# Patient Record
Sex: MALE | Race: WHITE | NOT HISPANIC OR LATINO | Employment: OTHER | URBAN - METROPOLITAN AREA
[De-identification: names, ages, dates, MRNs, and addresses within clinical notes are randomized per-mention and may not be internally consistent; named-entity substitution may affect disease eponyms.]

---

## 2018-04-10 LAB
ALBUMIN SERPL BCP-MCNC: 4.3 G/DL (ref 3.5–5.7)
ALP SERPL-CCNC: 68 IU/L (ref 55–165)
ALT SERPL W P-5'-P-CCNC: 12 IU/L (ref 10–35)
ANION GAP SERPL CALCULATED.3IONS-SCNC: 10.7 MM/L
AST SERPL W P-5'-P-CCNC: 16 U/L (ref 8–27)
BASOPHILS # BLD AUTO: 0.1 X3/UL (ref 0–0.3)
BASOPHILS # BLD AUTO: 1 % (ref 0–2)
BILIRUB SERPL-MCNC: 0.8 MG/DL (ref 0.3–1)
BUN SERPL-MCNC: 18 MG/DL (ref 7–25)
CALCIUM SERPL-MCNC: 9.5 MG/DL (ref 8.6–10.5)
CHLORIDE SERPL-SCNC: 103 MM/L (ref 98–107)
CHOLEST SERPL-MCNC: 110 MG/DL (ref 0–200)
CO2 SERPL-SCNC: 31 MM/L (ref 21–31)
CREAT SERPL-MCNC: 0.97 MG/DL (ref 0.7–1.3)
DEPRECATED RDW RBC AUTO: 13.7 % (ref 11.5–14.5)
EGFR (HISTORICAL): > 60 GFR
EGFR AFRICAN AMERICAN (HISTORICAL): > 60 GFR
EOSINOPHIL # BLD AUTO: 0.6 X3/UL (ref 0–0.5)
EOSINOPHIL NFR BLD AUTO: 7.1 % (ref 0–5)
GLUCOSE (HISTORICAL): 100 MG/DL (ref 65–99)
HCT VFR BLD AUTO: 45 % (ref 42–52)
HDLC SERPL-MCNC: 43 MG/DL (ref 40–60)
HGB BLD-MCNC: 14.9 G/DL (ref 14–18)
LDLC SERPL CALC-MCNC: 51.6 MG/DL (ref 75–193)
LYMPHOCYTES # BLD AUTO: 2.4 X3/UL (ref 1.2–4.2)
LYMPHOCYTES NFR BLD AUTO: 27 % (ref 20.5–51.1)
MCH RBC QN AUTO: 30.1 PG (ref 26–34)
MCHC RBC AUTO-ENTMCNC: 33.1 G/DL (ref 31–36)
MCV RBC AUTO: 90.9 FL (ref 81–99)
MONOCYTES # BLD AUTO: 0.9 X3/UL (ref 0–1)
MONOCYTES NFR BLD AUTO: 9.6 % (ref 1.7–12)
NEUTROPHILS # BLD AUTO: 5 X3/UL (ref 1.4–6.5)
NEUTS SEG NFR BLD AUTO: 55.3 % (ref 42.2–75.2)
OSMOLALITY, SERUM (HISTORICAL): 281 MOSM (ref 262–291)
PLATELET # BLD AUTO: 186 X3/UL (ref 130–400)
PMV BLD AUTO: 9.5 FL (ref 8.6–11.7)
POTASSIUM SERPL-SCNC: 4.7 MM/L (ref 3.5–5.5)
PSA (HISTORICAL): 5.29 NG/ML (ref 0–4)
RBC # BLD AUTO: 4.95 X6/UL (ref 4.3–5.9)
SODIUM SERPL-SCNC: 140 MM/L (ref 134–143)
TOTAL PROTEIN (HISTORICAL): 6.4 G/DL (ref 6.4–8.9)
TRIGL SERPL-MCNC: 78 MG/DL (ref 44–166)
VLDL CHOLESTEROL (HISTORICAL): 16 MG/DL (ref 5–51)
WBC # BLD AUTO: 9 X3/UL (ref 4.8–10.8)

## 2018-09-24 ENCOUNTER — APPOINTMENT (OUTPATIENT)
Dept: LAB | Facility: HOSPITAL | Age: 63
End: 2018-09-24
Attending: INTERNAL MEDICINE
Payer: COMMERCIAL

## 2018-09-24 ENCOUNTER — TRANSCRIBE ORDERS (OUTPATIENT)
Dept: ADMINISTRATIVE | Facility: HOSPITAL | Age: 63
End: 2018-09-24

## 2018-09-24 DIAGNOSIS — E78.5 HYPERLIPIDEMIA, UNSPECIFIED HYPERLIPIDEMIA TYPE: Primary | ICD-10-CM

## 2018-09-24 DIAGNOSIS — F17.200 SMOKER: ICD-10-CM

## 2018-09-24 DIAGNOSIS — E78.5 HYPERLIPIDEMIA, UNSPECIFIED HYPERLIPIDEMIA TYPE: ICD-10-CM

## 2018-09-24 DIAGNOSIS — R97.20 ELEVATED PSA: ICD-10-CM

## 2018-09-24 LAB
CHOLEST SERPL-MCNC: 119 MG/DL (ref 0–200)
HDLC SERPL-MCNC: 46 MG/DL (ref 40–60)
LDLC SERPL CALC-MCNC: 52 MG/DL (ref 75–193)
NONHDLC SERPL-MCNC: 73 MG/DL
TRIGL SERPL-MCNC: 103 MG/DL (ref 44–166)

## 2018-09-24 PROCEDURE — 80061 LIPID PANEL: CPT

## 2018-09-24 PROCEDURE — 36415 COLL VENOUS BLD VENIPUNCTURE: CPT

## 2018-09-24 PROCEDURE — 84153 ASSAY OF PSA TOTAL: CPT

## 2018-09-24 PROCEDURE — 84154 ASSAY OF PSA FREE: CPT

## 2018-09-25 LAB
PSA FREE MFR SERPL: 13.1 %
PSA FREE SERPL-MCNC: 0.77 NG/ML
PSA SERPL-MCNC: 5.9 NG/ML (ref 0–4)

## 2018-10-07 ENCOUNTER — HOSPITAL ENCOUNTER (OUTPATIENT)
Dept: CT IMAGING | Facility: HOSPITAL | Age: 63
Discharge: HOME | End: 2018-10-07
Attending: INTERNAL MEDICINE
Payer: COMMERCIAL

## 2018-10-07 DIAGNOSIS — F17.200 SMOKER: ICD-10-CM

## 2019-01-25 ENCOUNTER — TRANSCRIBE ORDERS (OUTPATIENT)
Dept: ADMINISTRATIVE | Facility: HOSPITAL | Age: 64
End: 2019-01-25

## 2019-01-25 ENCOUNTER — APPOINTMENT (OUTPATIENT)
Dept: LAB | Facility: HOSPITAL | Age: 64
End: 2019-01-25
Attending: UROLOGY
Payer: COMMERCIAL

## 2019-01-25 DIAGNOSIS — R97.20 ELEVATED PROSTATE SPECIFIC ANTIGEN (PSA): Primary | ICD-10-CM

## 2019-01-25 DIAGNOSIS — R97.20 ELEVATED PROSTATE SPECIFIC ANTIGEN (PSA): ICD-10-CM

## 2019-01-25 PROCEDURE — 36415 COLL VENOUS BLD VENIPUNCTURE: CPT

## 2019-01-25 PROCEDURE — 84153 ASSAY OF PSA TOTAL: CPT

## 2019-01-25 PROCEDURE — 84154 ASSAY OF PSA FREE: CPT

## 2019-01-26 LAB
PSA FREE MFR SERPL: 10.6 %
PSA FREE SERPL-MCNC: 0.7 NG/ML
PSA SERPL-MCNC: 6.6 NG/ML (ref 0–4)

## 2019-02-11 RX ORDER — GABAPENTIN 300 MG/1
CAPSULE ORAL
Qty: 360 CAPSULE | Refills: 3 | OUTPATIENT
Start: 2019-02-11

## 2019-02-19 ENCOUNTER — TRANSCRIBE ORDERS (OUTPATIENT)
Dept: ADMINISTRATIVE | Facility: HOSPITAL | Age: 64
End: 2019-02-19

## 2019-02-19 DIAGNOSIS — R97.20 ELEVATED PROSTATE SPECIFIC ANTIGEN (PSA): Primary | ICD-10-CM

## 2019-02-28 ENCOUNTER — HOSPITAL ENCOUNTER (OUTPATIENT)
Dept: ULTRASOUND IMAGING | Facility: HOSPITAL | Age: 64
Discharge: HOME | End: 2019-02-28
Attending: UROLOGY | Admitting: UROLOGY
Payer: COMMERCIAL

## 2019-02-28 DIAGNOSIS — R97.20 ELEVATED PROSTATE SPECIFIC ANTIGEN (PSA): ICD-10-CM

## 2019-02-28 PROCEDURE — G0416 PROSTATE BIOPSY, ANY MTHD: HCPCS | Performed by: PATHOLOGY

## 2019-02-28 PROCEDURE — 76942 ECHO GUIDE FOR BIOPSY: CPT

## 2019-02-28 PROCEDURE — 55700 HB BIOPSY OF PROSTATE: CPT

## 2019-03-13 ENCOUNTER — TELEPHONE (OUTPATIENT)
Dept: UROLOGY | Facility: MEDICAL CENTER | Age: 64
End: 2019-03-13

## 2019-03-13 NOTE — TELEPHONE ENCOUNTER
Jeromy Arroyo from Dr Fuentes's office referred patient to office  Patient has appointment in [facility] office on 03/23/2019  Renato Infante to have patent arrive 15 minutes prior to fill out new patient paperwork  New Patient Intake form:    Complaint/Diagnosis:  Prostate Cancer    Insurance:[employer]    History of Cancer: Prostate Cancer    Previous urologist: Dr Shana Lebron    Outside Testing/where:     If yes, what kind:    Records Requested/Where:Dr Emiliano Bean    Preferred location:

## 2019-03-15 RX ORDER — GABAPENTIN 600 MG/1
600 TABLET ORAL 2 TIMES DAILY
COMMUNITY
Start: 2019-02-06 | End: 2022-04-04

## 2019-03-15 RX ORDER — TRAMADOL HYDROCHLORIDE 50 MG/1
50 TABLET ORAL 3 TIMES DAILY PRN
Refills: 0 | COMMUNITY
Start: 2019-02-03 | End: 2022-04-04 | Stop reason: SDUPTHER

## 2019-03-15 RX ORDER — TAMSULOSIN HYDROCHLORIDE 0.4 MG/1
0.8 CAPSULE ORAL DAILY
Refills: 0 | COMMUNITY
Start: 2019-02-03 | End: 2019-04-10

## 2019-03-21 RX ORDER — ROSUVASTATIN CALCIUM 10 MG/1
10 TABLET, COATED ORAL DAILY
COMMUNITY
Start: 2018-06-04

## 2019-03-22 ENCOUNTER — OFFICE VISIT (OUTPATIENT)
Dept: UROLOGY | Facility: MEDICAL CENTER | Age: 64
End: 2019-03-22
Payer: COMMERCIAL

## 2019-03-22 VITALS
SYSTOLIC BLOOD PRESSURE: 110 MMHG | WEIGHT: 122 LBS | DIASTOLIC BLOOD PRESSURE: 74 MMHG | HEIGHT: 68 IN | BODY MASS INDEX: 18.49 KG/M2 | HEART RATE: 76 BPM

## 2019-03-22 DIAGNOSIS — C61 MALIGNANT NEOPLASM OF PROSTATE (HCC): Primary | ICD-10-CM

## 2019-03-22 DIAGNOSIS — N13.8 BPH WITH OBSTRUCTION/LOWER URINARY TRACT SYMPTOMS: ICD-10-CM

## 2019-03-22 DIAGNOSIS — N40.1 BPH WITH OBSTRUCTION/LOWER URINARY TRACT SYMPTOMS: ICD-10-CM

## 2019-03-22 LAB
SL AMB  POCT GLUCOSE, UA: ABNORMAL
SL AMB LEUKOCYTE ESTERASE,UA: ABNORMAL
SL AMB POCT BILIRUBIN,UA: ABNORMAL
SL AMB POCT BLOOD,UA: ABNORMAL
SL AMB POCT CLARITY,UA: CLEAR
SL AMB POCT COLOR,UA: YELLOW
SL AMB POCT KETONES,UA: ABNORMAL
SL AMB POCT NITRITE,UA: ABNORMAL
SL AMB POCT PH,UA: 7
SL AMB POCT SPECIFIC GRAVITY,UA: 1.01
SL AMB POCT URINE PROTEIN: ABNORMAL
SL AMB POCT UROBILINOGEN: 0.2

## 2019-03-22 PROCEDURE — 81003 URINALYSIS AUTO W/O SCOPE: CPT | Performed by: UROLOGY

## 2019-03-22 PROCEDURE — 99245 OFF/OP CONSLTJ NEW/EST HI 55: CPT | Performed by: UROLOGY

## 2019-03-22 RX ORDER — ASPIRIN 81 MG/1
81 TABLET ORAL DAILY
Status: ON HOLD | COMMUNITY
End: 2019-04-22

## 2019-03-22 NOTE — LETTER
March 22, 2019     Johnnie Jim MD  [address]  [facility] [address]    Patient: Jarett Loredo   YOB: 1955   Date of Visit: 3/22/2019       Dear Dr Ashia Lang: Thank you for referring Jonny Dasilva to me for evaluation  Below are my notes for this consultation  If you have questions, please do not hesitate to call me  I look forward to following your patient along with you  Sincerely,        Romel Nicholas MD        CC: Claudette Browning, DO Mackey Maxin, MD  3/22/2019  3:58 PM  Sign at close encounter    Jarett Loredo 61 y o  male MRN: [id number]    Assessment/Plan     Assessment:  Prostate cancer  History of BPH    Plan:  Plan:  After careful review and summary of the medical history and all laboratory and radiographic studies as outlined above, I discussed with the patient and family members at length the finding of adenocarcinoma of the prostate  We discussed the patient's grade (Mulberry score 6 and 7), other parameters, and clinical stage based upon the above findings  I then discussed the options available to him at this time, based on his clinically localized prostate cancer  These include observation, radiation therapy, radical prostatectomy and cryosurgery  Regarding observation, they understand this is ideally suited for men with either clinically insignificant or slow growing cancer or for whom because of age or other concurrent medical conditions the risks of treatment are greater than the potential benefits of treatment  While this option avoids treatment-associated side effects, I explained it requires frequent evaluation in order to monitor for potential disease progression, and that despite vigilant follow-up there will be cases where the cancer spreads and is no longer potentially curable with local therapies   I reiterated that for many men, watchful waiting can be associated with increased anxiety and stress concerning their diagnosis, and that commonly patients only defer definitive treatment to a later date  With regard to radiation therapy, I reviewed the options available  These include external beam radiation therapy versus brachytherapy  Regarding XRT, we discussed the differences in conventional versus 3D conformal external beam radiation therapy versus IMRT  The indications, risks and potential complications of each of these options as well as the rationale for using selective radiation options were reviewed in detail  We also discussed interstitial seed therapy and risk stratification as a determinant for optimal therapies  All questions were again answered  I did offer to refer the patient to the radiation oncologists directly in order to further clarify these issues  With regard to the surgical options, we reviewed the different options including a radical perineal prostatectomy versus the radical retropubic prostatectomy via open, laparoscopic and robotic approach  I explained the operations that I primarily perform, robotic-assisted laparoscopic radical prostatectomy, and compared it with the alternative that I am also experienced with, traditional open retropubic prostatectomy  I also mentioned the less commonly used but accepted perineal approach  The advantages and limitations of these various approaches were described in detail  The anticipated hospital and post-operative courses were reviewed  I highlighted the relevant anatomy, and we discussed the importance of neurovascular bundle preservation (nerve-sparing) to minimize negative effects on erectile function and continence  While in the majority of cases bilateral nerve-sparing is appropriate and possible, they understand that in certain circumstances intentional partial or complete unilateral or bilateral neurovascular bundle resection is necessary when there is suspicion that cancer extends into that region; this may be determined either preoperatively or intraoperatively with or without biopsy   The patient is clearly aware that he may still be rendered sexually impotent and incontinent as a consequence of the operative procedure  The possibility of stress-type urinary incontinence associated with either form of radical prostatectomy was also detailed for the patient  I explained that concurrent pelvic lymphadenectomy may be performed, for intermediate, or high risk clinical disease or suspicious intraoperative adenopathy, which serves both a diagnostic and potentially therapeutic purpose if metastatic disease is identified  We discussed the general risks of surgery, which include but are not limited to infection, bleeding, medical and anesthetic complications, and adjacent organ involvement or injury  With regard to cryosurgery, this is felt to be an experimental option at this time secondary to insufficient data to support its routine use in this clinical setting  Regarding hormonal therapy, we discussed it is primarily used as systemic therapy for patients with advanced or metastatic disease, but that it also has been shown to be beneficial in conjunction with radiation therapy for certain categories of patients  The patient understands that hormonal therapy alone is not considered curative treatment and will slow the growth of but not eliminate prostate cancer  The patient was advised to become an active participant in their care and to become proactive in their care  They were encouraged to call my office with any unanswered questions or seek additional medical opinions at their discretion  They understand that the decision as to which approach to take is one made based on the medical risks and benefits as well as their own informed tolerance of this risk  He was offered referral to radiation oncology and is interested in surgery  Once the patient is scheduled for a RALP/PLND, he will need preadmission testing and anesthesia clearance   Kegel exercises were reviewed, and he may be seen, pre operatively, by our continence nurse  I have discussed the risks, benefits and alternatives to the proposed procedure/treatment plan with the patient   Our discussion also included the risks and benefits of the alternatives, including doing nothing  He was encouraged to ask questions and all questions were answered to their satisfaction  At the end of our discussion the patient gave their verbal consent to the proposed procedure/treatment plan  History of Present Illness   HPI:  Александр Espinosa is a 61 y o  male who presents with a PSA of 6 6, and underwent a prostate biopsy on 02/28/2019 by Dr Osiel Choudhury, which demonstrated prostate cancer Amanda score 7 and a Beatris score 6, in 9 of 12 cores     Patient does report a prior history of BPH for which he is being treated with tamsulosin  He is here for a opinion concerning the options for treating his recently diagnosed prostate cancer  He had a left inguinal hernia repair performed 20 or 30 years ago the through an inguinal incision  Final Diagnosis   A  Prostate, right base lateral, core needle biopsy:             - Prostatic adenocarcinoma, Beatris score 3 + 4 = 7, Prognostic Grade Group II, discontinuously involving 90% of one core biopsy              - Less than 10% Beatris pattern 4              - Perineural invasion is identified      B  Prostate, right base, core needle biopsy:             - Prostatic adenocarcinoma, Amanda score 3 + 3 = 6, Prognostic Grade Group I  Involving 35% of one core biopsy  - Perineural invasion is identified      C  Prostate, left base lateral, core needle biopsy:             - Prostatic adenocarcinoma, Beatris score 4 + 3 = 7, Prognostic Grade Group III, involving 60% of one core biopsy  - Approximately 50% Amanda pattern 4             - Perineural invasion is identified      D  Prostate, left base, core needle biopsy:             - Benign prostate glands               - No malignancy is identified      E  Prostate, right mid lateral, core needle biopsy:             - Minute focus of prostatic adenocarcinoma, Beatris score 3 + 3 = 6, Prognostic Grade Group I, involving less than 5% of one core biopsy      F  Prostate, right mid, core needle biopsy:             - Prostatic adenocarcinoma, Beatris score 3 + 3 = 6, Prognostic Grade Group I, involving 20% of one core biopsy      G  Prostate, left mid lateral, core needle biopsy             - Prostatic adenocarcinoma, Beatris score 4 + 3 = 7, Prognostic Grade Group III, discontinuously involving 90% of one of 2 core biopsies (approximately 80% Beatris pattern 4)  - Prostatic adenocarcinoma, [place] score 3 + 4 = 7, Prognostic Grade Group II, involving 95% of 1 of 2 core biopsies (approximately 25% [place] pattern 4)      H  Prostate, left mid, core needle biopsy:             - Prostatic adenocarcinoma, Beatris score 4 + 3 = 7, Prognostic Grade Group III, involving approximately 50% of one core biopsy (approximately 90% Beatris pattern 4)      I  Prostate, right apex lateral, core needle biopsy:             - Benign prostate glands  - No malignancy is identified      J  Prostate, right apex, core needle biopsy:             - Benign prostate glands  - no malignancy is identified      K  Prostate, left apex lateral, core needle biopsy:             - Prostatic adenocarcinoma, [place] score 3 + 4 = 7, Prognostic Grade Group II, involving 90% of 1 fragmented core biopsy (less than 5% Beatris pattern 4)      L  Prostate, left apex, core needle biopsy:             - Prostatic adenocarcinoma, Beatris score 3 + 3 = 6, Prognostic Grade Group I  Involving 35% of one core biopsy  Review of Systems   Constitutional: Negative  HENT: Negative  Eyes: Negative  Respiratory: Negative  Cardiovascular: Negative  Negative for chest pain  Gastrointestinal: Negative  Endocrine: Negative  Genitourinary: Negative  Musculoskeletal: Negative  Skin: Negative  Allergic/Immunologic: Negative  Neurological: Negative  Hematological: Negative  Psychiatric/Behavioral: Negative  Historical Information   History reviewed  No pertinent past medical history  Past Surgical History:   Procedure Laterality Date    CARDIAC SURGERY  2006    INGUINAL HERNIA REPAIR Right 1996    SKIN CANCER EXCISION  2016    US GUIDED PROSTATE BIOPSY  2/28/2019     Social History   Social History     Substance and Sexual Activity   Alcohol Use Not Currently     Social History     Substance and Sexual Activity   Drug Use Never     Social History     Tobacco Use   Smoking Status Current Every Day Smoker    Packs/day: 1 00    Years: 30 00    Pack years: 30 00   Smokeless Tobacco Never Used     Family History:   Family History   Problem Relation Age of Onset    Cancer Father         bladder    Lung cancer Father         metastatic       Meds/Allergies   all medications and allergies reviewed  No Known Allergies    Objective   Vitals: Blood pressure 110/74, pulse 76, height 5' 8" (1 727 m), weight 55 3 kg (122 lb)  Physical Exam   Constitutional: He is oriented to person, place, and time  He appears well-developed and well-nourished  No distress  HENT:   Head: Normocephalic and atraumatic  Eyes: Pupils are equal, round, and reactive to light  Conjunctivae and EOM are normal    Neck: Normal range of motion  Neck supple  Cardiovascular: Normal rate, regular rhythm, normal heart sounds and intact distal pulses  No murmur heard  Pulmonary/Chest: Effort normal and breath sounds normal  No respiratory distress  He has no wheezes  Abdominal: Soft  Bowel sounds are normal  There is no tenderness  Genitourinary: Penis normal  Prostate is enlarged  Prostate is not tender  Genitourinary Comments: Prostate exam:  2 to 3/4 enlargement with bilateral induration but no obvious nodules  Musculoskeletal: Normal range of motion  Lymphadenopathy:     He has no cervical adenopathy  Neurological: He is alert and oriented to person, place, and time  Skin: Skin is warm and dry  Psychiatric: He has a normal mood and affect  His behavior is normal  Judgment and thought content normal        Lab Results: I have personally reviewed pertinent reports  CBC: No results found for: WBC, HGB, HCT, MCV, PLT, ADJUSTEDWBC, MCH, MCHC, RDW, MPV, NRBC  CMP: No results found for: SODIUM, CL, CO2, ANIONGAP, BUN, CREATININE, GLUCOSE, CALCIUM, AST, ALT, ALKPHOS, PROT, BILITOT, EGFR  Imaging: I have personally reviewed pertinent reports  and I have personally reviewed pertinent films in PACS  EKG, Pathology, and Other Studies: I have personally reviewed pertinent reports  and I have personally reviewed pertinent films in PACS    Counseling / Coordination of Care  Total floor / unit time spent today 80 minutes  Greater than 50% of total time was spent with the patient and / or family counseling and / or coordination of care  A description of the counseling / coordination of care

## 2019-03-22 NOTE — H&P
H&P Exam - Urology   Maya Nogueira 61 y o  male MRN: [id number]    Assessment/Plan     Assessment:  Prostate cancer  History of BPH    Plan:  Plan:  After careful review and summary of the medical history and all laboratory and radiographic studies as outlined above, I discussed with the patient and family members at length the finding of adenocarcinoma of the prostate  We discussed the patient's grade ([place] score 6 and 7), other parameters, and clinical stage based upon the above findings  I then discussed the options available to him at this time, based on his clinically localized prostate cancer  These include observation, radiation therapy, radical prostatectomy and cryosurgery  Regarding observation, they understand this is ideally suited for men with either clinically insignificant or slow growing cancer or for whom because of age or other concurrent medical conditions the risks of treatment are greater than the potential benefits of treatment  While this option avoids treatment-associated side effects, I explained it requires frequent evaluation in order to monitor for potential disease progression, and that despite vigilant follow-up there will be cases where the cancer spreads and is no longer potentially curable with local therapies  I reiterated that for many men, watchful waiting can be associated with increased anxiety and stress concerning their diagnosis, and that commonly patients only defer definitive treatment to a later date  With regard to radiation therapy, I reviewed the options available  These include external beam radiation therapy versus brachytherapy  Regarding XRT, we discussed the differences in conventional versus 3D conformal external beam radiation therapy versus IMRT  The indications, risks and potential complications of each of these options as well as the rationale for using selective radiation options were reviewed in detail   We also discussed interstitial seed therapy and risk stratification as a determinant for optimal therapies  All questions were again answered  I did offer to refer the patient to the radiation oncologists directly in order to further clarify these issues  With regard to the surgical options, we reviewed the different options including a radical perineal prostatectomy versus the radical retropubic prostatectomy via open, laparoscopic and robotic approach  I explained the operations that I primarily perform, robotic-assisted laparoscopic radical prostatectomy, and compared it with the alternative that I am also experienced with, traditional open retropubic prostatectomy  I also mentioned the less commonly used but accepted perineal approach  The advantages and limitations of these various approaches were described in detail  The anticipated hospital and post-operative courses were reviewed  I highlighted the relevant anatomy, and we discussed the importance of neurovascular bundle preservation (nerve-sparing) to minimize negative effects on erectile function and continence  While in the majority of cases bilateral nerve-sparing is appropriate and possible, they understand that in certain circumstances intentional partial or complete unilateral or bilateral neurovascular bundle resection is necessary when there is suspicion that cancer extends into that region; this may be determined either preoperatively or intraoperatively with or without biopsy  The patient is clearly aware that he may still be rendered sexually impotent and incontinent as a consequence of the operative procedure  The possibility of stress-type urinary incontinence associated with either form of radical prostatectomy was also detailed for the patient   I explained that concurrent pelvic lymphadenectomy may be performed, for intermediate, or high risk clinical disease or suspicious intraoperative adenopathy, which serves both a diagnostic and potentially therapeutic purpose if metastatic disease is identified  We discussed the general risks of surgery, which include but are not limited to infection, bleeding, medical and anesthetic complications, and adjacent organ involvement or injury  With regard to cryosurgery, this is felt to be an experimental option at this time secondary to insufficient data to support its routine use in this clinical setting  Regarding hormonal therapy, we discussed it is primarily used as systemic therapy for patients with advanced or metastatic disease, but that it also has been shown to be beneficial in conjunction with radiation therapy for certain categories of patients  The patient understands that hormonal therapy alone is not considered curative treatment and will slow the growth of but not eliminate prostate cancer  The patient was advised to become an active participant in their care and to become proactive in their care  They were encouraged to call my office with any unanswered questions or seek additional medical opinions at their discretion  They understand that the decision as to which approach to take is one made based on the medical risks and benefits as well as their own informed tolerance of this risk  He was offered referral to radiation oncology and is interested in surgery  Once the patient is scheduled for a RALP/PLND, he will need preadmission testing and anesthesia clearance  Kegel exercises were reviewed, and he may be seen, pre operatively, by our continence nurse  I have discussed the risks, benefits and alternatives to the proposed procedure/treatment plan with the patient   Our discussion also included the risks and benefits of the alternatives, including doing nothing  He was encouraged to ask questions and all questions were answered to their satisfaction  At the end of our discussion the patient gave their verbal consent to the proposed procedure/treatment plan              History of Present Illness   HPI:  Kelly De La Cruz is a 61 y o  male who presents with a PSA of 6 6, and underwent a prostate biopsy on 02/28/2019 by Dr Christian Ivey, which demonstrated prostate cancer Beatris score 7 and a Beatris score 6, in 9 of 12 cores     Patient does report a prior history of BPH for which he is being treated with tamsulosin  He is here for a opinion concerning the options for treating his recently diagnosed prostate cancer  He had a left inguinal hernia repair performed 20 or 30 years ago the through an inguinal incision  Final Diagnosis   A  Prostate, right base lateral, core needle biopsy:             - Prostatic adenocarcinoma, Beatris score 3 + 4 = 7, Prognostic Grade Group II, discontinuously involving 90% of one core biopsy              - Less than 10% [place] pattern 4              - Perineural invasion is identified      B  Prostate, right base, core needle biopsy:             - Prostatic adenocarcinoma, [place] score 3 + 3 = 6, Prognostic Grade Group I  Involving 35% of one core biopsy  - Perineural invasion is identified      C  Prostate, left base lateral, core needle biopsy:             - Prostatic adenocarcinoma, Beatris score 4 + 3 = 7, Prognostic Grade Group III, involving 60% of one core biopsy  - Approximately 50% [place] pattern 4             - Perineural invasion is identified      D  Prostate, left base, core needle biopsy:             - Benign prostate glands  - No malignancy is identified      E  Prostate, right mid lateral, core needle biopsy:             - Minute focus of prostatic adenocarcinoma, Beatris score 3 + 3 = 6, Prognostic Grade Group I, involving less than 5% of one core biopsy      F  Prostate, right mid, core needle biopsy:             - Prostatic adenocarcinoma, Beatris score 3 + 3 = 6, Prognostic Grade Group I, involving 20% of one core biopsy      G   Prostate, left mid lateral, core needle biopsy             - Prostatic adenocarcinoma, Beatris score 4 + 3 = 7, Prognostic Grade Group III, discontinuously involving 90% of one of 2 core biopsies (approximately 80% [place]ris pattern 4)  - Prostatic adenocarcinoma, Beatris score 3 + 4 = 7, Prognostic Grade Group II, involving 95% of 1 of 2 core biopsies (approximately 25% [place] pattern 4)      H  Prostate, left mid, core needle biopsy:             - Prostatic adenocarcinoma, [place] score 4 + 3 = 7, Prognostic Grade Group III, involving approximately 50% of one core biopsy (approximately 90% Beatris pattern 4)      I  Prostate, right apex lateral, core needle biopsy:             - Benign prostate glands  - No malignancy is identified      J  Prostate, right apex, core needle biopsy:             - Benign prostate glands  - no malignancy is identified      K  Prostate, left apex lateral, core needle biopsy:             - Prostatic adenocarcinoma, Beatris score 3 + 4 = 7, Prognostic Grade Group II, involving 90% of 1 fragmented core biopsy (less than 5% [place] pattern 4)      L  Prostate, left apex, core needle biopsy:             - Prostatic adenocarcinoma, Beatris score 3 + 3 = 6, Prognostic Grade Group I  Involving 35% of one core biopsy  Review of Systems   Constitutional: Negative  HENT: Negative  Eyes: Negative  Respiratory: Negative  Cardiovascular: Negative  Negative for chest pain  Gastrointestinal: Negative  Endocrine: Negative  Genitourinary: Negative  Musculoskeletal: Negative  Skin: Negative  Allergic/Immunologic: Negative  Neurological: Negative  Hematological: Negative  Psychiatric/Behavioral: Negative  Historical Information   History reviewed  No pertinent past medical history    Past Surgical History:   Procedure Laterality Date    CARDIAC SURGERY  2006    INGUINAL HERNIA REPAIR Right 1996    SKIN CANCER EXCISION  2016    US GUIDED PROSTATE BIOPSY  2/28/2019     Social History   Social History     Substance and Sexual Activity Alcohol Use Not Currently     Social History     Substance and Sexual Activity   Drug Use Never     Social History     Tobacco Use   Smoking Status Current Every Day Smoker    Packs/day: 1 00    Years: 30 00    Pack years: 30 00   Smokeless Tobacco Never Used     Family History:   Family History   Problem Relation Age of Onset    Cancer Father         bladder    Lung cancer Father         metastatic       Meds/Allergies   all medications and allergies reviewed  No Known Allergies    Objective   Vitals: Blood pressure 110/74, pulse 76, height 5' 8" (1 727 m), weight 55 3 kg (122 lb)  Physical Exam   Constitutional: He is oriented to person, place, and time  He appears well-developed and well-nourished  No distress  HENT:   Head: Normocephalic and atraumatic  Eyes: Pupils are equal, round, and reactive to light  Conjunctivae and EOM are normal    Neck: Normal range of motion  Neck supple  Cardiovascular: Normal rate, regular rhythm, normal heart sounds and intact distal pulses  No murmur heard  Pulmonary/Chest: Effort normal and breath sounds normal  No respiratory distress  He has no wheezes  Abdominal: Soft  Bowel sounds are normal  There is no tenderness  Genitourinary: Penis normal  Prostate is enlarged  Prostate is not tender  Genitourinary Comments: Prostate exam:  2 to 3/4 enlargement with bilateral induration but no obvious nodules  Musculoskeletal: Normal range of motion  Lymphadenopathy:     He has no cervical adenopathy  Neurological: He is alert and oriented to person, place, and time  Skin: Skin is warm and dry  Psychiatric: He has a normal mood and affect  His behavior is normal  Judgment and thought content normal        Lab Results: I have personally reviewed pertinent reports      CBC: No results found for: WBC, HGB, HCT, MCV, PLT, ADJUSTEDWBC, MCH, MCHC, RDW, MPV, NRBC  CMP: No results found for: SODIUM, CL, CO2, ANIONGAP, BUN, CREATININE, GLUCOSE, CALCIUM, AST, ALT, ALKPHOS, PROT, BILITOT, EGFR  Imaging: I have personally reviewed pertinent reports  and I have personally reviewed pertinent films in PACS  EKG, Pathology, and Other Studies: I have personally reviewed pertinent reports  and I have personally reviewed pertinent films in PACS    Counseling / Coordination of Care  Total floor / unit time spent today 80 minutes  Greater than 50% of total time was spent with the patient and / or family counseling and / or coordination of care  A description of the counseling / coordination of care

## 2019-03-22 NOTE — PROGRESS NOTES
Constance Koch 61 y o  male MRN: [id number]    Assessment/Plan     Assessment:  Prostate cancer  History of BPH    Plan:  Plan:  After careful review and summary of the medical history and all laboratory and radiographic studies as outlined above, I discussed with the patient and family members at length the finding of adenocarcinoma of the prostate  We discussed the patient's grade (Haswell score 6 and 7), other parameters, and clinical stage based upon the above findings  I then discussed the options available to him at this time, based on his clinically localized prostate cancer  These include observation, radiation therapy, radical prostatectomy and cryosurgery  Regarding observation, they understand this is ideally suited for men with either clinically insignificant or slow growing cancer or for whom because of age or other concurrent medical conditions the risks of treatment are greater than the potential benefits of treatment  While this option avoids treatment-associated side effects, I explained it requires frequent evaluation in order to monitor for potential disease progression, and that despite vigilant follow-up there will be cases where the cancer spreads and is no longer potentially curable with local therapies  I reiterated that for many men, watchful waiting can be associated with increased anxiety and stress concerning their diagnosis, and that commonly patients only defer definitive treatment to a later date  With regard to radiation therapy, I reviewed the options available  These include external beam radiation therapy versus brachytherapy  Regarding XRT, we discussed the differences in conventional versus 3D conformal external beam radiation therapy versus IMRT  The indications, risks and potential complications of each of these options as well as the rationale for using selective radiation options were reviewed in detail   We also discussed interstitial seed therapy and risk stratification as a determinant for optimal therapies  All questions were again answered  I did offer to refer the patient to the radiation oncologists directly in order to further clarify these issues  With regard to the surgical options, we reviewed the different options including a radical perineal prostatectomy versus the radical retropubic prostatectomy via open, laparoscopic and robotic approach  I explained the operations that I primarily perform, robotic-assisted laparoscopic radical prostatectomy, and compared it with the alternative that I am also experienced with, traditional open retropubic prostatectomy  I also mentioned the less commonly used but accepted perineal approach  The advantages and limitations of these various approaches were described in detail  The anticipated hospital and post-operative courses were reviewed  I highlighted the relevant anatomy, and we discussed the importance of neurovascular bundle preservation (nerve-sparing) to minimize negative effects on erectile function and continence  While in the majority of cases bilateral nerve-sparing is appropriate and possible, they understand that in certain circumstances intentional partial or complete unilateral or bilateral neurovascular bundle resection is necessary when there is suspicion that cancer extends into that region; this may be determined either preoperatively or intraoperatively with or without biopsy  The patient is clearly aware that he may still be rendered sexually impotent and incontinent as a consequence of the operative procedure  The possibility of stress-type urinary incontinence associated with either form of radical prostatectomy was also detailed for the patient   I explained that concurrent pelvic lymphadenectomy may be performed, for intermediate, or high risk clinical disease or suspicious intraoperative adenopathy, which serves both a diagnostic and potentially therapeutic purpose if metastatic disease is identified  We discussed the general risks of surgery, which include but are not limited to infection, bleeding, medical and anesthetic complications, and adjacent organ involvement or injury  With regard to cryosurgery, this is felt to be an experimental option at this time secondary to insufficient data to support its routine use in this clinical setting  Regarding hormonal therapy, we discussed it is primarily used as systemic therapy for patients with advanced or metastatic disease, but that it also has been shown to be beneficial in conjunction with radiation therapy for certain categories of patients  The patient understands that hormonal therapy alone is not considered curative treatment and will slow the growth of but not eliminate prostate cancer  The patient was advised to become an active participant in their care and to become proactive in their care  They were encouraged to call my office with any unanswered questions or seek additional medical opinions at their discretion  They understand that the decision as to which approach to take is one made based on the medical risks and benefits as well as their own informed tolerance of this risk  He was offered referral to radiation oncology and is interested in surgery  Once the patient is scheduled for a RALP/PLND, he will need preadmission testing and anesthesia clearance  Kegel exercises were reviewed, and he may be seen, pre operatively, by our continence nurse  I have discussed the risks, benefits and alternatives to the proposed procedure/treatment plan with the patient   Our discussion also included the risks and benefits of the alternatives, including doing nothing  He was encouraged to ask questions and all questions were answered to their satisfaction  At the end of our discussion the patient gave their verbal consent to the proposed procedure/treatment plan  History of Present Illness   HPI:  Yael Romo is a 61 y o  male who presents with a PSA of 6 6, and underwent a prostate biopsy on 02/28/2019 by Dr Damian Aguilar, which demonstrated prostate cancer Beatris score 7 and a Dexter score 6, in 9 of 12 cores     Patient does report a prior history of BPH for which he is being treated with tamsulosin  He is here for a opinion concerning the options for treating his recently diagnosed prostate cancer  He had a left inguinal hernia repair performed 20 or 30 years ago the through an inguinal incision  Final Diagnosis   A  Prostate, right base lateral, core needle biopsy:             - Prostatic adenocarcinoma, Dexter score 3 + 4 = 7, Prognostic Grade Group II, discontinuously involving 90% of one core biopsy              - Less than 10% Beatris pattern 4              - Perineural invasion is identified      B  Prostate, right base, core needle biopsy:             - Prostatic adenocarcinoma, Dexter score 3 + 3 = 6, Prognostic Grade Group I  Involving 35% of one core biopsy  - Perineural invasion is identified      C  Prostate, left base lateral, core needle biopsy:             - Prostatic adenocarcinoma, Beatris score 4 + 3 = 7, Prognostic Grade Group III, involving 60% of one core biopsy  - Approximately 50% Beatris pattern 4             - Perineural invasion is identified      D  Prostate, left base, core needle biopsy:             - Benign prostate glands  - No malignancy is identified      E  Prostate, right mid lateral, core needle biopsy:             - Minute focus of prostatic adenocarcinoma, Dexter score 3 + 3 = 6, Prognostic Grade Group I, involving less than 5% of one core biopsy      F  Prostate, right mid, core needle biopsy:             - Prostatic adenocarcinoma, Dexter score 3 + 3 = 6, Prognostic Grade Group I, involving 20% of one core biopsy      G   Prostate, left mid lateral, core needle biopsy             - Prostatic adenocarcinoma, Beatris score 4 + 3 = 7, Prognostic Grade Group III, discontinuously involving 90% of one of 2 core biopsies (approximately 80% Beatris pattern 4)  - Prostatic adenocarcinoma, Beatris score 3 + 4 = 7, Prognostic Grade Group II, involving 95% of 1 of 2 core biopsies (approximately 25% Beatris pattern 4)      H  Prostate, left mid, core needle biopsy:             - Prostatic adenocarcinoma, Beatris score 4 + 3 = 7, Prognostic Grade Group III, involving approximately 50% of one core biopsy (approximately 90% Wilder pattern 4)      I  Prostate, right apex lateral, core needle biopsy:             - Benign prostate glands  - No malignancy is identified      J  Prostate, right apex, core needle biopsy:             - Benign prostate glands  - no malignancy is identified      K  Prostate, left apex lateral, core needle biopsy:             - Prostatic adenocarcinoma, Beatris score 3 + 4 = 7, Prognostic Grade Group II, involving 90% of 1 fragmented core biopsy (less than 5% Beatris pattern 4)      L  Prostate, left apex, core needle biopsy:             - Prostatic adenocarcinoma, Beatris score 3 + 3 = 6, Prognostic Grade Group I  Involving 35% of one core biopsy  Review of Systems   Constitutional: Negative  HENT: Negative  Eyes: Negative  Respiratory: Negative  Cardiovascular: Negative  Negative for chest pain  Gastrointestinal: Negative  Endocrine: Negative  Genitourinary: Negative  Musculoskeletal: Negative  Skin: Negative  Allergic/Immunologic: Negative  Neurological: Negative  Hematological: Negative  Psychiatric/Behavioral: Negative  Historical Information   History reviewed  No pertinent past medical history    Past Surgical History:   Procedure Laterality Date    CARDIAC SURGERY  2006    INGUINAL HERNIA REPAIR Right 1996    SKIN CANCER EXCISION  2016    US GUIDED PROSTATE BIOPSY  2/28/2019     Social History   Social History     Substance and Sexual Activity   Alcohol Use Not Currently     Social History     Substance and Sexual Activity   Drug Use Never     Social History     Tobacco Use   Smoking Status Current Every Day Smoker    Packs/day: 1 00    Years: 30 00    Pack years: 30 00   Smokeless Tobacco Never Used     Family History:   Family History   Problem Relation Age of Onset    Cancer Father         bladder    Lung cancer Father         metastatic       Meds/Allergies   all medications and allergies reviewed  No Known Allergies    Objective   Vitals: Blood pressure 110/74, pulse 76, height 5' 8" (1 727 m), weight 55 3 kg (122 lb)  Physical Exam   Constitutional: He is oriented to person, place, and time  He appears well-developed and well-nourished  No distress  HENT:   Head: Normocephalic and atraumatic  Eyes: Pupils are equal, round, and reactive to light  Conjunctivae and EOM are normal    Neck: Normal range of motion  Neck supple  Cardiovascular: Normal rate, regular rhythm, normal heart sounds and intact distal pulses  No murmur heard  Pulmonary/Chest: Effort normal and breath sounds normal  No respiratory distress  He has no wheezes  Abdominal: Soft  Bowel sounds are normal  There is no tenderness  Genitourinary: Penis normal  Prostate is enlarged  Prostate is not tender  Genitourinary Comments: Prostate exam:  2 to 3/4 enlargement with bilateral induration but no obvious nodules  Musculoskeletal: Normal range of motion  Lymphadenopathy:     He has no cervical adenopathy  Neurological: He is alert and oriented to person, place, and time  Skin: Skin is warm and dry  Psychiatric: He has a normal mood and affect  His behavior is normal  Judgment and thought content normal        Lab Results: I have personally reviewed pertinent reports      CBC: No results found for: WBC, HGB, HCT, MCV, PLT, ADJUSTEDWBC, MCH, MCHC, RDW, MPV, NRBC  CMP: No results found for: SODIUM, CL, CO2, ANIONGAP, BUN, CREATININE, GLUCOSE, CALCIUM, AST, ALT, ALKPHOS, PROT, BILITOT, EGFR  Imaging: I have personally reviewed pertinent reports  and I have personally reviewed pertinent films in PACS  EKG, Pathology, and Other Studies: I have personally reviewed pertinent reports  and I have personally reviewed pertinent films in PACS    Counseling / Coordination of Care  Total floor / unit time spent today 80 minutes  Greater than 50% of total time was spent with the patient and / or family counseling and / or coordination of care  A description of the counseling / coordination of care

## 2019-03-25 ENCOUNTER — TELEPHONE (OUTPATIENT)
Dept: UROLOGY | Facility: MEDICAL CENTER | Age: 64
End: 2019-03-25

## 2019-03-25 DIAGNOSIS — C61 MALIGNANT NEOPLASM OF PROSTATE (HCC): ICD-10-CM

## 2019-03-25 NOTE — TELEPHONE ENCOUNTER
Patient dropped off FMLA and Short term disability Forms to the office today 3/25/2019, however his surgery is not yet scheduled  Paper work will be completed when the surgery date has been established  Patient will also make forms fee payment when date has been established  3/26/19 @ 1126AM, patient scheduled his procedure for 4/22/2019  I left a voice mail message for the patient to call my direct line  Patient advised that the forms fee for the two forms is $45 00  Patient will need to pay the forms fee prior to faxing or at  in the office  3/26/19 @ 11:29AM return phone call from patient stating he will pay the form fee at   Patient advised that the forms will be ready for  any time tomorrow 3/27/19 at the  [address]

## 2019-03-26 PROBLEM — C61 MALIGNANT NEOPLASM OF PROSTATE (HCC): Status: ACTIVE | Noted: 2019-03-26

## 2019-04-02 ENCOUNTER — OFFICE VISIT (OUTPATIENT)
Dept: CARDIOLOGY CLINIC | Facility: CLINIC | Age: 64
End: 2019-04-02
Payer: COMMERCIAL

## 2019-04-02 VITALS
HEIGHT: 68 IN | HEART RATE: 66 BPM | BODY MASS INDEX: 18.49 KG/M2 | WEIGHT: 122 LBS | DIASTOLIC BLOOD PRESSURE: 70 MMHG | SYSTOLIC BLOOD PRESSURE: 108 MMHG

## 2019-04-02 DIAGNOSIS — Z01.810 PRE-OPERATIVE CARDIOVASCULAR EXAMINATION: ICD-10-CM

## 2019-04-02 DIAGNOSIS — E78.5 DYSLIPIDEMIA: ICD-10-CM

## 2019-04-02 DIAGNOSIS — I25.10 CORONARY ARTERY DISEASE INVOLVING NATIVE CORONARY ARTERY OF NATIVE HEART WITHOUT ANGINA PECTORIS: Primary | ICD-10-CM

## 2019-04-02 PROCEDURE — 99214 OFFICE O/P EST MOD 30 MIN: CPT | Performed by: INTERNAL MEDICINE

## 2019-04-02 PROCEDURE — 93000 ELECTROCARDIOGRAM COMPLETE: CPT | Performed by: INTERNAL MEDICINE

## 2019-04-04 ENCOUNTER — TELEPHONE (OUTPATIENT)
Dept: UROLOGY | Facility: MEDICAL CENTER | Age: 64
End: 2019-04-04

## 2019-04-09 DIAGNOSIS — C61 PROSTATE CANCER (HCC): Primary | ICD-10-CM

## 2019-04-10 ENCOUNTER — APPOINTMENT (OUTPATIENT)
Dept: LAB | Facility: HOSPITAL | Age: 64
End: 2019-04-10
Attending: UROLOGY
Payer: COMMERCIAL

## 2019-04-10 ENCOUNTER — PROCEDURE VISIT (OUTPATIENT)
Dept: UROLOGY | Facility: MEDICAL CENTER | Age: 64
End: 2019-04-10
Payer: COMMERCIAL

## 2019-04-10 ENCOUNTER — HOSPITAL ENCOUNTER (OUTPATIENT)
Dept: NON INVASIVE DIAGNOSTICS | Facility: HOSPITAL | Age: 64
Discharge: HOME | End: 2019-04-10
Attending: UROLOGY
Payer: COMMERCIAL

## 2019-04-10 ENCOUNTER — HOSPITAL ENCOUNTER (OUTPATIENT)
Dept: RADIOLOGY | Facility: HOSPITAL | Age: 64
Discharge: HOME | End: 2019-04-10
Attending: UROLOGY
Payer: COMMERCIAL

## 2019-04-10 ENCOUNTER — ANESTHESIA EVENT (OUTPATIENT)
Dept: PERIOP | Facility: HOSPITAL | Age: 64
DRG: 707 | End: 2019-04-10
Payer: COMMERCIAL

## 2019-04-10 ENCOUNTER — APPOINTMENT (OUTPATIENT)
Dept: PREADMISSION TESTING | Facility: HOSPITAL | Age: 64
End: 2019-04-10
Payer: COMMERCIAL

## 2019-04-10 DIAGNOSIS — C61 PROSTATE CANCER (HCC): ICD-10-CM

## 2019-04-10 DIAGNOSIS — C61 MALIGNANT NEOPLASM OF PROSTATE (HCC): Primary | ICD-10-CM

## 2019-04-10 LAB
ABO GROUP BLD: NORMAL
ALBUMIN SERPL BCP-MCNC: 4.1 G/DL (ref 3.5–5)
ALP SERPL-CCNC: 77 U/L (ref 46–116)
ALT SERPL W P-5'-P-CCNC: 19 U/L (ref 12–78)
ANION GAP SERPL CALCULATED.3IONS-SCNC: 6 MMOL/L (ref 4–13)
APTT PPP: 34 SECONDS (ref 26–38)
AST SERPL W P-5'-P-CCNC: 17 U/L (ref 5–45)
BASOPHILS # BLD AUTO: 0.08 THOUSANDS/ΜL (ref 0–0.1)
BASOPHILS NFR BLD AUTO: 1 % (ref 0–1)
BILIRUB SERPL-MCNC: 0.7 MG/DL (ref 0.2–1)
BLD GP AB SCN SERPL QL: NEGATIVE
BUN SERPL-MCNC: 15 MG/DL (ref 5–25)
CALCIUM SERPL-MCNC: 9.4 MG/DL (ref 8.3–10.1)
CHLORIDE SERPL-SCNC: 102 MMOL/L (ref 100–108)
CO2 SERPL-SCNC: 32 MMOL/L (ref 21–32)
CREAT SERPL-MCNC: 0.86 MG/DL (ref 0.6–1.3)
EOSINOPHIL # BLD AUTO: 0.31 THOUSAND/ΜL (ref 0–0.61)
EOSINOPHIL NFR BLD AUTO: 4 % (ref 0–6)
ERYTHROCYTE [DISTWIDTH] IN BLOOD BY AUTOMATED COUNT: 13.3 % (ref 11.6–15.1)
GFR SERPL CREATININE-BSD FRML MDRD: 92 ML/MIN/1.73SQ M
GLUCOSE SERPL-MCNC: 87 MG/DL (ref 65–140)
HCT VFR BLD AUTO: 45 % (ref 36.5–49.3)
HGB BLD-MCNC: 15 G/DL (ref 12–17)
IMM GRANULOCYTES # BLD AUTO: 0.01 THOUSAND/UL (ref 0–0.2)
IMM GRANULOCYTES NFR BLD AUTO: 0 % (ref 0–2)
INR PPP: 0.97 (ref 0.86–1.17)
LYMPHOCYTES # BLD AUTO: 2.5 THOUSANDS/ΜL (ref 0.6–4.47)
LYMPHOCYTES NFR BLD AUTO: 30 % (ref 14–44)
MCH RBC QN AUTO: 31.4 PG (ref 26.8–34.3)
MCHC RBC AUTO-ENTMCNC: 33.3 G/DL (ref 31.4–37.4)
MCV RBC AUTO: 94 FL (ref 82–98)
MONOCYTES # BLD AUTO: 0.67 THOUSAND/ΜL (ref 0.17–1.22)
MONOCYTES NFR BLD AUTO: 8 % (ref 4–12)
NEUTROPHILS # BLD AUTO: 4.77 THOUSANDS/ΜL (ref 1.85–7.62)
NEUTS SEG NFR BLD AUTO: 57 % (ref 43–75)
NRBC BLD AUTO-RTO: 0 /100 WBCS
PLATELET # BLD AUTO: 181 THOUSANDS/UL (ref 149–390)
PMV BLD AUTO: 11.4 FL (ref 8.9–12.7)
POTASSIUM SERPL-SCNC: 4.6 MMOL/L (ref 3.5–5.3)
PROT SERPL-MCNC: 7.2 G/DL (ref 6.4–8.2)
PROTHROMBIN TIME: 13 SECONDS (ref 11.8–14.2)
RBC # BLD AUTO: 4.77 MILLION/UL (ref 3.88–5.62)
RH BLD: POSITIVE
SODIUM SERPL-SCNC: 140 MMOL/L (ref 136–145)
SPECIMEN EXPIRATION DATE: NORMAL
WBC # BLD AUTO: 8.34 THOUSAND/UL (ref 4.31–10.16)

## 2019-04-10 PROCEDURE — 86901 BLOOD TYPING SEROLOGIC RH(D): CPT

## 2019-04-10 PROCEDURE — 85730 THROMBOPLASTIN TIME PARTIAL: CPT

## 2019-04-10 PROCEDURE — 85025 COMPLETE CBC W/AUTO DIFF WBC: CPT

## 2019-04-10 PROCEDURE — 80053 COMPREHEN METABOLIC PANEL: CPT

## 2019-04-10 PROCEDURE — 71046 X-RAY EXAM CHEST 2 VIEWS: CPT

## 2019-04-10 PROCEDURE — 86900 BLOOD TYPING SEROLOGIC ABO: CPT

## 2019-04-10 PROCEDURE — 99211 OFF/OP EST MAY X REQ PHY/QHP: CPT | Performed by: UROLOGY

## 2019-04-10 PROCEDURE — 86850 RBC ANTIBODY SCREEN: CPT

## 2019-04-10 PROCEDURE — 36415 COLL VENOUS BLD VENIPUNCTURE: CPT

## 2019-04-10 PROCEDURE — 86920 COMPATIBILITY TEST SPIN: CPT

## 2019-04-10 PROCEDURE — 85610 PROTHROMBIN TIME: CPT

## 2019-04-10 RX ORDER — SODIUM CHLORIDE 9 MG/ML
125 INJECTION, SOLUTION INTRAVENOUS CONTINUOUS
Status: CANCELLED | OUTPATIENT
Start: 2019-04-22

## 2019-04-19 PROCEDURE — NC001 PR NO CHARGE: Performed by: UROLOGY

## 2019-04-19 RX ORDER — SODIUM CHLORIDE 9 MG/ML
125 INJECTION, SOLUTION INTRAVENOUS CONTINUOUS
Status: CANCELLED | OUTPATIENT
Start: 2019-04-22

## 2019-04-22 ENCOUNTER — ANESTHESIA (OUTPATIENT)
Dept: PERIOP | Facility: HOSPITAL | Age: 64
DRG: 707 | End: 2019-04-22
Payer: COMMERCIAL

## 2019-04-22 ENCOUNTER — HOSPITAL ENCOUNTER (INPATIENT)
Facility: HOSPITAL | Age: 64
LOS: 3 days | Discharge: HOME HEALTH | DRG: 707 | End: 2019-04-25
Attending: UROLOGY | Admitting: UROLOGY
Payer: COMMERCIAL

## 2019-04-22 DIAGNOSIS — I97.89: Primary | ICD-10-CM

## 2019-04-22 DIAGNOSIS — C61 MALIGNANT NEOPLASM OF PROSTATE (HCC): ICD-10-CM

## 2019-04-22 DIAGNOSIS — J95.89: Primary | ICD-10-CM

## 2019-04-22 PROCEDURE — 88305 TISSUE EXAM BY PATHOLOGIST: CPT | Performed by: PATHOLOGY

## 2019-04-22 PROCEDURE — 88309 TISSUE EXAM BY PATHOLOGIST: CPT | Performed by: PATHOLOGY

## 2019-04-22 PROCEDURE — 0VT04ZZ RESECTION OF PROSTATE, PERCUTANEOUS ENDOSCOPIC APPROACH: ICD-10-PCS | Performed by: UROLOGY

## 2019-04-22 PROCEDURE — 07BC4ZZ EXCISION OF PELVIS LYMPHATIC, PERCUTANEOUS ENDOSCOPIC APPROACH: ICD-10-PCS | Performed by: UROLOGY

## 2019-04-22 PROCEDURE — 55866 LAPS SURG PRST8ECT RPBIC RAD: CPT | Performed by: PHYSICIAN ASSISTANT

## 2019-04-22 PROCEDURE — 8E0W4CZ ROBOTIC ASSISTED PROCEDURE OF TRUNK REGION, PERCUTANEOUS ENDOSCOPIC APPROACH: ICD-10-PCS | Performed by: UROLOGY

## 2019-04-22 PROCEDURE — 38571 LAPAROSCOPY LYMPHADENECTOMY: CPT | Performed by: PHYSICIAN ASSISTANT

## 2019-04-22 PROCEDURE — 99024 POSTOP FOLLOW-UP VISIT: CPT | Performed by: PHYSICIAN ASSISTANT

## 2019-04-22 PROCEDURE — 38571 LAPAROSCOPY LYMPHADENECTOMY: CPT | Performed by: UROLOGY

## 2019-04-22 PROCEDURE — 55866 LAPS SURG PRST8ECT RPBIC RAD: CPT | Performed by: UROLOGY

## 2019-04-22 RX ORDER — CIPROFLOXACIN 250 MG/1
250 TABLET, FILM COATED ORAL EVERY 12 HOURS SCHEDULED
Status: DISCONTINUED | OUTPATIENT
Start: 2019-04-23 | End: 2019-04-25 | Stop reason: HOSPADM

## 2019-04-22 RX ORDER — CEFAZOLIN SODIUM 2 G/50ML
2000 SOLUTION INTRAVENOUS ONCE
Status: COMPLETED | OUTPATIENT
Start: 2019-04-22 | End: 2019-04-22

## 2019-04-22 RX ORDER — GABAPENTIN 600 MG/1
600 TABLET ORAL
COMMUNITY
End: 2020-04-30 | Stop reason: ALTCHOICE

## 2019-04-22 RX ORDER — TRAMADOL HYDROCHLORIDE 50 MG/1
50 TABLET ORAL EVERY 6 HOURS PRN
Status: DISCONTINUED | OUTPATIENT
Start: 2019-04-22 | End: 2019-04-25 | Stop reason: HOSPADM

## 2019-04-22 RX ORDER — MIDAZOLAM HYDROCHLORIDE 1 MG/ML
INJECTION INTRAMUSCULAR; INTRAVENOUS AS NEEDED
Status: DISCONTINUED | OUTPATIENT
Start: 2019-04-22 | End: 2019-04-22 | Stop reason: SURG

## 2019-04-22 RX ORDER — METOCLOPRAMIDE HYDROCHLORIDE 5 MG/ML
10 INJECTION INTRAMUSCULAR; INTRAVENOUS EVERY 6 HOURS SCHEDULED
Status: DISCONTINUED | OUTPATIENT
Start: 2019-04-22 | End: 2019-04-25 | Stop reason: HOSPADM

## 2019-04-22 RX ORDER — ACETAMINOPHEN 325 MG/1
650 TABLET ORAL EVERY 6 HOURS PRN
Status: DISCONTINUED | OUTPATIENT
Start: 2019-04-22 | End: 2019-04-25 | Stop reason: HOSPADM

## 2019-04-22 RX ORDER — ROCURONIUM BROMIDE 10 MG/ML
INJECTION, SOLUTION INTRAVENOUS AS NEEDED
Status: DISCONTINUED | OUTPATIENT
Start: 2019-04-22 | End: 2019-04-22 | Stop reason: SURG

## 2019-04-22 RX ORDER — HEPARIN SODIUM 5000 [USP'U]/ML
5000 INJECTION, SOLUTION INTRAVENOUS; SUBCUTANEOUS EVERY 8 HOURS SCHEDULED
Status: CANCELLED | OUTPATIENT
Start: 2019-04-22

## 2019-04-22 RX ORDER — HEPARIN SODIUM 5000 [USP'U]/ML
5000 INJECTION, SOLUTION INTRAVENOUS; SUBCUTANEOUS EVERY 12 HOURS SCHEDULED
Status: DISCONTINUED | OUTPATIENT
Start: 2019-04-22 | End: 2019-04-23

## 2019-04-22 RX ORDER — SODIUM CHLORIDE 9 MG/ML
INJECTION, SOLUTION INTRAVENOUS AS NEEDED
Status: DISCONTINUED | OUTPATIENT
Start: 2019-04-22 | End: 2019-04-22 | Stop reason: HOSPADM

## 2019-04-22 RX ORDER — HYDROMORPHONE HCL/PF 1 MG/ML
0.5 SYRINGE (ML) INJECTION
Status: DISCONTINUED | OUTPATIENT
Start: 2019-04-22 | End: 2019-04-22 | Stop reason: HOSPADM

## 2019-04-22 RX ORDER — SODIUM CHLORIDE, SODIUM LACTATE, POTASSIUM CHLORIDE, CALCIUM CHLORIDE 600; 310; 30; 20 MG/100ML; MG/100ML; MG/100ML; MG/100ML
125 INJECTION, SOLUTION INTRAVENOUS CONTINUOUS
Status: DISCONTINUED | OUTPATIENT
Start: 2019-04-22 | End: 2019-04-23

## 2019-04-22 RX ORDER — DOCUSATE SODIUM 100 MG/1
100 CAPSULE, LIQUID FILLED ORAL 2 TIMES DAILY
Status: DISCONTINUED | OUTPATIENT
Start: 2019-04-22 | End: 2019-04-25 | Stop reason: HOSPADM

## 2019-04-22 RX ORDER — FAMOTIDINE 20 MG/1
20 TABLET, FILM COATED ORAL 2 TIMES DAILY
Status: DISCONTINUED | OUTPATIENT
Start: 2019-04-22 | End: 2019-04-25 | Stop reason: HOSPADM

## 2019-04-22 RX ORDER — GABAPENTIN 300 MG/1
300 CAPSULE ORAL 2 TIMES DAILY
Status: DISCONTINUED | OUTPATIENT
Start: 2019-04-22 | End: 2019-04-24 | Stop reason: SDUPTHER

## 2019-04-22 RX ORDER — HYDROMORPHONE HCL/PF 1 MG/ML
0.2 SYRINGE (ML) INJECTION
Status: DISCONTINUED | OUTPATIENT
Start: 2019-04-22 | End: 2019-04-23

## 2019-04-22 RX ORDER — OXYCODONE HYDROCHLORIDE AND ACETAMINOPHEN 5; 325 MG/1; MG/1
1 TABLET ORAL EVERY 4 HOURS PRN
Status: COMPLETED | OUTPATIENT
Start: 2019-04-22 | End: 2019-04-25

## 2019-04-22 RX ORDER — MAGNESIUM HYDROXIDE 1200 MG/15ML
LIQUID ORAL AS NEEDED
Status: DISCONTINUED | OUTPATIENT
Start: 2019-04-22 | End: 2019-04-22 | Stop reason: HOSPADM

## 2019-04-22 RX ORDER — OXYBUTYNIN CHLORIDE 5 MG/1
5 TABLET ORAL 4 TIMES DAILY PRN
Status: DISCONTINUED | OUTPATIENT
Start: 2019-04-22 | End: 2019-04-25 | Stop reason: HOSPADM

## 2019-04-22 RX ORDER — DEXAMETHASONE SODIUM PHOSPHATE 4 MG/ML
INJECTION, SOLUTION INTRA-ARTICULAR; INTRALESIONAL; INTRAMUSCULAR; INTRAVENOUS; SOFT TISSUE AS NEEDED
Status: DISCONTINUED | OUTPATIENT
Start: 2019-04-22 | End: 2019-04-22 | Stop reason: SURG

## 2019-04-22 RX ORDER — FENTANYL CITRATE 50 UG/ML
INJECTION, SOLUTION INTRAMUSCULAR; INTRAVENOUS AS NEEDED
Status: DISCONTINUED | OUTPATIENT
Start: 2019-04-22 | End: 2019-04-22 | Stop reason: SURG

## 2019-04-22 RX ORDER — HEPARIN SODIUM 5000 [USP'U]/ML
5000 INJECTION, SOLUTION INTRAVENOUS; SUBCUTANEOUS ONCE
Status: COMPLETED | OUTPATIENT
Start: 2019-04-22 | End: 2019-04-22

## 2019-04-22 RX ORDER — GABAPENTIN 600 MG/1
600 TABLET ORAL
Status: CANCELLED | OUTPATIENT
Start: 2019-04-22

## 2019-04-22 RX ORDER — EPHEDRINE SULFATE 50 MG/ML
INJECTION INTRAVENOUS AS NEEDED
Status: DISCONTINUED | OUTPATIENT
Start: 2019-04-22 | End: 2019-04-22 | Stop reason: SURG

## 2019-04-22 RX ORDER — ONDANSETRON 2 MG/ML
4 INJECTION INTRAMUSCULAR; INTRAVENOUS EVERY 6 HOURS PRN
Status: DISCONTINUED | OUTPATIENT
Start: 2019-04-22 | End: 2019-04-22 | Stop reason: HOSPADM

## 2019-04-22 RX ORDER — ONDANSETRON 2 MG/ML
INJECTION INTRAMUSCULAR; INTRAVENOUS AS NEEDED
Status: DISCONTINUED | OUTPATIENT
Start: 2019-04-22 | End: 2019-04-22 | Stop reason: SURG

## 2019-04-22 RX ORDER — GLYCOPYRROLATE 0.2 MG/ML
INJECTION INTRAMUSCULAR; INTRAVENOUS AS NEEDED
Status: DISCONTINUED | OUTPATIENT
Start: 2019-04-22 | End: 2019-04-22 | Stop reason: SURG

## 2019-04-22 RX ORDER — PROPOFOL 10 MG/ML
INJECTION, EMULSION INTRAVENOUS AS NEEDED
Status: DISCONTINUED | OUTPATIENT
Start: 2019-04-22 | End: 2019-04-22 | Stop reason: SURG

## 2019-04-22 RX ORDER — HYDROMORPHONE HCL/PF 1 MG/ML
SYRINGE (ML) INJECTION AS NEEDED
Status: DISCONTINUED | OUTPATIENT
Start: 2019-04-22 | End: 2019-04-22 | Stop reason: SURG

## 2019-04-22 RX ORDER — FENTANYL CITRATE 50 UG/ML
25 INJECTION, SOLUTION INTRAMUSCULAR; INTRAVENOUS
Status: DISCONTINUED | OUTPATIENT
Start: 2019-04-22 | End: 2019-04-22 | Stop reason: HOSPADM

## 2019-04-22 RX ORDER — SODIUM CHLORIDE 9 MG/ML
125 INJECTION, SOLUTION INTRAVENOUS CONTINUOUS
Status: DISCONTINUED | OUTPATIENT
Start: 2019-04-22 | End: 2019-04-22

## 2019-04-22 RX ORDER — KETOROLAC TROMETHAMINE 30 MG/ML
15 INJECTION, SOLUTION INTRAMUSCULAR; INTRAVENOUS EVERY 6 HOURS
Status: DISCONTINUED | OUTPATIENT
Start: 2019-04-22 | End: 2019-04-23

## 2019-04-22 RX ORDER — SIMETHICONE 80 MG
80 TABLET,CHEWABLE ORAL EVERY 6 HOURS PRN
Status: DISCONTINUED | OUTPATIENT
Start: 2019-04-22 | End: 2019-04-25 | Stop reason: HOSPADM

## 2019-04-22 RX ORDER — ONDANSETRON 2 MG/ML
4 INJECTION INTRAMUSCULAR; INTRAVENOUS EVERY 6 HOURS PRN
Status: DISCONTINUED | OUTPATIENT
Start: 2019-04-22 | End: 2019-04-25 | Stop reason: HOSPADM

## 2019-04-22 RX ORDER — SODIUM CHLORIDE 9 MG/ML
INJECTION, SOLUTION INTRAVENOUS CONTINUOUS PRN
Status: DISCONTINUED | OUTPATIENT
Start: 2019-04-22 | End: 2019-04-22 | Stop reason: SURG

## 2019-04-22 RX ORDER — SODIUM CHLORIDE 9 MG/ML
50 INJECTION, SOLUTION INTRAVENOUS CONTINUOUS
Status: DISCONTINUED | OUTPATIENT
Start: 2019-04-22 | End: 2019-04-23

## 2019-04-22 RX ORDER — NEOSTIGMINE METHYLSULFATE 1 MG/ML
INJECTION INTRAVENOUS AS NEEDED
Status: DISCONTINUED | OUTPATIENT
Start: 2019-04-22 | End: 2019-04-22 | Stop reason: SURG

## 2019-04-22 RX ORDER — CEFAZOLIN SODIUM 1 G/50ML
1000 SOLUTION INTRAVENOUS EVERY 8 HOURS
Status: COMPLETED | OUTPATIENT
Start: 2019-04-22 | End: 2019-04-23

## 2019-04-22 RX ORDER — BACITRACIN, NEOMYCIN, POLYMYXIN B 400; 3.5; 5 [USP'U]/G; MG/G; [USP'U]/G
1 OINTMENT TOPICAL 2 TIMES DAILY
Status: DISCONTINUED | OUTPATIENT
Start: 2019-04-22 | End: 2019-04-25 | Stop reason: HOSPADM

## 2019-04-22 RX ORDER — BUPIVACAINE HYDROCHLORIDE 5 MG/ML
INJECTION, SOLUTION PERINEURAL AS NEEDED
Status: DISCONTINUED | OUTPATIENT
Start: 2019-04-22 | End: 2019-04-22 | Stop reason: HOSPADM

## 2019-04-22 RX ADMIN — SODIUM CHLORIDE 1000 ML: 0.9 INJECTION, SOLUTION INTRAVENOUS at 23:30

## 2019-04-22 RX ADMIN — PHENYLEPHRINE HYDROCHLORIDE 40 MCG/MIN: 10 INJECTION INTRAVENOUS at 13:09

## 2019-04-22 RX ADMIN — ROCURONIUM BROMIDE 50 MG: 10 INJECTION, SOLUTION INTRAVENOUS at 12:41

## 2019-04-22 RX ADMIN — GABAPENTIN 300 MG: 300 CAPSULE ORAL at 18:12

## 2019-04-22 RX ADMIN — DOCUSATE SODIUM 100 MG: 100 CAPSULE, LIQUID FILLED ORAL at 18:12

## 2019-04-22 RX ADMIN — SODIUM CHLORIDE: 0.9 INJECTION, SOLUTION INTRAVENOUS at 12:48

## 2019-04-22 RX ADMIN — MIDAZOLAM 2 MG: 1 INJECTION INTRAMUSCULAR; INTRAVENOUS at 12:39

## 2019-04-22 RX ADMIN — SODIUM CHLORIDE 500 ML: 0.9 INJECTION, SOLUTION INTRAVENOUS at 16:52

## 2019-04-22 RX ADMIN — PHENYLEPHRINE HYDROCHLORIDE 150 MCG: 10 INJECTION INTRAVENOUS at 12:45

## 2019-04-22 RX ADMIN — NEOSTIGMINE METHYLSULFATE 3 MG: 1 INJECTION, SOLUTION INTRAVENOUS at 16:18

## 2019-04-22 RX ADMIN — METOCLOPRAMIDE 10 MG: 5 INJECTION, SOLUTION INTRAMUSCULAR; INTRAVENOUS at 18:42

## 2019-04-22 RX ADMIN — FENTANYL CITRATE 25 MCG: 50 INJECTION, SOLUTION INTRAMUSCULAR; INTRAVENOUS at 17:26

## 2019-04-22 RX ADMIN — PHENYLEPHRINE HYDROCHLORIDE 100 MCG: 10 INJECTION INTRAVENOUS at 15:04

## 2019-04-22 RX ADMIN — ROCURONIUM BROMIDE 10 MG: 10 INJECTION, SOLUTION INTRAVENOUS at 14:10

## 2019-04-22 RX ADMIN — HYDROMORPHONE HYDROCHLORIDE 0.5 MG: 1 INJECTION, SOLUTION INTRAMUSCULAR; INTRAVENOUS; SUBCUTANEOUS at 16:15

## 2019-04-22 RX ADMIN — FENTANYL CITRATE 100 MCG: 50 INJECTION, SOLUTION INTRAMUSCULAR; INTRAVENOUS at 12:41

## 2019-04-22 RX ADMIN — FENTANYL CITRATE 25 MCG: 50 INJECTION, SOLUTION INTRAMUSCULAR; INTRAVENOUS at 17:18

## 2019-04-22 RX ADMIN — SODIUM CHLORIDE: 0.9 INJECTION, SOLUTION INTRAVENOUS at 12:57

## 2019-04-22 RX ADMIN — CEFAZOLIN SODIUM 2000 MG: 2 SOLUTION INTRAVENOUS at 12:47

## 2019-04-22 RX ADMIN — KETOROLAC TROMETHAMINE 15 MG: 30 INJECTION, SOLUTION INTRAMUSCULAR at 18:13

## 2019-04-22 RX ADMIN — SODIUM CHLORIDE 125 ML/HR: 0.9 INJECTION, SOLUTION INTRAVENOUS at 10:29

## 2019-04-22 RX ADMIN — FAMOTIDINE 20 MG: 20 TABLET ORAL at 18:12

## 2019-04-22 RX ADMIN — SODIUM CHLORIDE: 0.9 INJECTION, SOLUTION INTRAVENOUS at 15:36

## 2019-04-22 RX ADMIN — FENTANYL CITRATE 50 MCG: 50 INJECTION, SOLUTION INTRAMUSCULAR; INTRAVENOUS at 13:19

## 2019-04-22 RX ADMIN — DEXAMETHASONE SODIUM PHOSPHATE 6 MG: 4 INJECTION, SOLUTION INTRAMUSCULAR; INTRAVENOUS at 13:28

## 2019-04-22 RX ADMIN — HEPARIN SODIUM 5000 UNITS: 5000 INJECTION, SOLUTION INTRAVENOUS; SUBCUTANEOUS at 11:49

## 2019-04-22 RX ADMIN — ROCURONIUM BROMIDE 5 MG: 10 INJECTION, SOLUTION INTRAVENOUS at 15:16

## 2019-04-22 RX ADMIN — BACITRACIN, NEOMYCIN, POLYMYXIN B 1 SMALL APPLICATION: 400; 3.5; 5 OINTMENT TOPICAL at 18:13

## 2019-04-22 RX ADMIN — FENTANYL CITRATE 25 MCG: 50 INJECTION, SOLUTION INTRAMUSCULAR; INTRAVENOUS at 17:08

## 2019-04-22 RX ADMIN — CEFAZOLIN SODIUM 1000 MG: 1 SOLUTION INTRAVENOUS at 19:50

## 2019-04-22 RX ADMIN — ONDANSETRON 4 MG: 2 INJECTION INTRAMUSCULAR; INTRAVENOUS at 15:16

## 2019-04-22 RX ADMIN — HYDROMORPHONE HYDROCHLORIDE 0.5 MG: 1 INJECTION, SOLUTION INTRAMUSCULAR; INTRAVENOUS; SUBCUTANEOUS at 13:24

## 2019-04-22 RX ADMIN — SODIUM CHLORIDE: 0.9 INJECTION, SOLUTION INTRAVENOUS at 15:22

## 2019-04-22 RX ADMIN — EPHEDRINE SULFATE 10 MG: 50 INJECTION, SOLUTION INTRAVENOUS at 16:15

## 2019-04-22 RX ADMIN — PHENYLEPHRINE HYDROCHLORIDE 100 MCG: 10 INJECTION INTRAVENOUS at 16:10

## 2019-04-22 RX ADMIN — LIDOCAINE HYDROCHLORIDE 100 MG: 20 INJECTION, SOLUTION INTRAVENOUS at 12:41

## 2019-04-22 RX ADMIN — KETOROLAC TROMETHAMINE 15 MG: 30 INJECTION, SOLUTION INTRAMUSCULAR at 23:29

## 2019-04-22 RX ADMIN — PROPOFOL 200 MG: 10 INJECTION, EMULSION INTRAVENOUS at 12:41

## 2019-04-22 RX ADMIN — GLYCOPYRROLATE 0.5 MG: 0.2 INJECTION INTRAMUSCULAR; INTRAVENOUS at 16:18

## 2019-04-22 RX ADMIN — ROCURONIUM BROMIDE 20 MG: 10 INJECTION, SOLUTION INTRAVENOUS at 13:22

## 2019-04-22 RX ADMIN — METOCLOPRAMIDE 10 MG: 5 INJECTION, SOLUTION INTRAMUSCULAR; INTRAVENOUS at 23:29

## 2019-04-22 RX ADMIN — PHENYLEPHRINE HYDROCHLORIDE 150 MCG: 10 INJECTION INTRAVENOUS at 13:14

## 2019-04-22 RX ADMIN — FENTANYL CITRATE 50 MCG: 50 INJECTION, SOLUTION INTRAMUSCULAR; INTRAVENOUS at 13:24

## 2019-04-23 PROBLEM — Z90.79 S/P PROSTATECTOMY: Status: ACTIVE | Noted: 2019-04-23

## 2019-04-23 LAB
ANION GAP SERPL CALCULATED.3IONS-SCNC: 7 MMOL/L (ref 4–13)
BUN SERPL-MCNC: 13 MG/DL (ref 5–25)
CALCIUM SERPL-MCNC: 7.3 MG/DL (ref 8.3–10.1)
CHLORIDE SERPL-SCNC: 112 MMOL/L (ref 100–108)
CO2 SERPL-SCNC: 20 MMOL/L (ref 21–32)
CREAT SERPL-MCNC: 0.84 MG/DL (ref 0.6–1.3)
ERYTHROCYTE [DISTWIDTH] IN BLOOD BY AUTOMATED COUNT: 13.3 % (ref 11.6–15.1)
GFR SERPL CREATININE-BSD FRML MDRD: 93 ML/MIN/1.73SQ M
GLUCOSE SERPL-MCNC: 178 MG/DL (ref 65–140)
HCT VFR BLD AUTO: 27.5 % (ref 36.5–49.3)
HCT VFR BLD AUTO: 28.5 % (ref 36.5–49.3)
HGB BLD-MCNC: 9.2 G/DL (ref 12–17)
HGB BLD-MCNC: 9.4 G/DL (ref 12–17)
MCH RBC QN AUTO: 31.4 PG (ref 26.8–34.3)
MCHC RBC AUTO-ENTMCNC: 33 G/DL (ref 31.4–37.4)
MCV RBC AUTO: 95 FL (ref 82–98)
PLATELET # BLD AUTO: 149 THOUSANDS/UL (ref 149–390)
PMV BLD AUTO: 10.9 FL (ref 8.9–12.7)
POTASSIUM SERPL-SCNC: 4 MMOL/L (ref 3.5–5.3)
RBC # BLD AUTO: 2.99 MILLION/UL (ref 3.88–5.62)
SODIUM SERPL-SCNC: 139 MMOL/L (ref 136–145)
WBC # BLD AUTO: 9.84 THOUSAND/UL (ref 4.31–10.16)

## 2019-04-23 PROCEDURE — 80048 BASIC METABOLIC PNL TOTAL CA: CPT | Performed by: UROLOGY

## 2019-04-23 PROCEDURE — 85014 HEMATOCRIT: CPT | Performed by: UROLOGY

## 2019-04-23 PROCEDURE — 85027 COMPLETE CBC AUTOMATED: CPT | Performed by: UROLOGY

## 2019-04-23 PROCEDURE — 85018 HEMOGLOBIN: CPT | Performed by: UROLOGY

## 2019-04-23 RX ORDER — HYDROMORPHONE HCL/PF 1 MG/ML
0.2 SYRINGE (ML) INJECTION EVERY 4 HOURS PRN
Status: DISCONTINUED | OUTPATIENT
Start: 2019-04-23 | End: 2019-04-25 | Stop reason: HOSPADM

## 2019-04-23 RX ORDER — KETOROLAC TROMETHAMINE 30 MG/ML
15 INJECTION, SOLUTION INTRAMUSCULAR; INTRAVENOUS EVERY 6 HOURS SCHEDULED
Status: DISCONTINUED | OUTPATIENT
Start: 2019-04-23 | End: 2019-04-24

## 2019-04-23 RX ORDER — MORPHINE SULFATE 4 MG/ML
4 INJECTION, SOLUTION INTRAMUSCULAR; INTRAVENOUS EVERY 4 HOURS PRN
Status: DISCONTINUED | OUTPATIENT
Start: 2019-04-23 | End: 2019-04-23

## 2019-04-23 RX ORDER — DEXTROSE AND SODIUM CHLORIDE 5; .9 G/100ML; G/100ML
50 INJECTION, SOLUTION INTRAVENOUS CONTINUOUS
Status: DISCONTINUED | OUTPATIENT
Start: 2019-04-23 | End: 2019-04-25

## 2019-04-23 RX ADMIN — FAMOTIDINE 20 MG: 20 TABLET ORAL at 08:55

## 2019-04-23 RX ADMIN — CIPROFLOXACIN HYDROCHLORIDE 250 MG: 250 TABLET, FILM COATED ORAL at 08:56

## 2019-04-23 RX ADMIN — OXYCODONE HYDROCHLORIDE AND ACETAMINOPHEN 1 TABLET: 5; 325 TABLET ORAL at 11:42

## 2019-04-23 RX ADMIN — KETOROLAC TROMETHAMINE 15 MG: 30 INJECTION, SOLUTION INTRAMUSCULAR at 23:24

## 2019-04-23 RX ADMIN — GABAPENTIN 300 MG: 300 CAPSULE ORAL at 08:55

## 2019-04-23 RX ADMIN — BACITRACIN, NEOMYCIN, POLYMYXIN B 1 SMALL APPLICATION: 400; 3.5; 5 OINTMENT TOPICAL at 17:16

## 2019-04-23 RX ADMIN — DEXTROSE AND SODIUM CHLORIDE 50 ML/HR: 5; .9 INJECTION, SOLUTION INTRAVENOUS at 13:41

## 2019-04-23 RX ADMIN — OXYBUTYNIN CHLORIDE 5 MG: 5 TABLET ORAL at 21:05

## 2019-04-23 RX ADMIN — METOCLOPRAMIDE 10 MG: 5 INJECTION, SOLUTION INTRAMUSCULAR; INTRAVENOUS at 23:24

## 2019-04-23 RX ADMIN — DOCUSATE SODIUM 100 MG: 100 CAPSULE, LIQUID FILLED ORAL at 17:15

## 2019-04-23 RX ADMIN — BACITRACIN, NEOMYCIN, POLYMYXIN B 1 SMALL APPLICATION: 400; 3.5; 5 OINTMENT TOPICAL at 08:55

## 2019-04-23 RX ADMIN — METOCLOPRAMIDE 10 MG: 5 INJECTION, SOLUTION INTRAMUSCULAR; INTRAVENOUS at 05:27

## 2019-04-23 RX ADMIN — DOCUSATE SODIUM 100 MG: 100 CAPSULE, LIQUID FILLED ORAL at 08:55

## 2019-04-23 RX ADMIN — KETOROLAC TROMETHAMINE 15 MG: 30 INJECTION, SOLUTION INTRAMUSCULAR at 13:43

## 2019-04-23 RX ADMIN — MORPHINE SULFATE 4 MG: 4 INJECTION INTRAVENOUS at 12:39

## 2019-04-23 RX ADMIN — CIPROFLOXACIN HYDROCHLORIDE 250 MG: 250 TABLET, FILM COATED ORAL at 21:05

## 2019-04-23 RX ADMIN — KETOROLAC TROMETHAMINE 15 MG: 30 INJECTION, SOLUTION INTRAMUSCULAR at 05:27

## 2019-04-23 RX ADMIN — OXYCODONE HYDROCHLORIDE AND ACETAMINOPHEN 1 TABLET: 5; 325 TABLET ORAL at 07:16

## 2019-04-23 RX ADMIN — OXYCODONE HYDROCHLORIDE AND ACETAMINOPHEN 1 TABLET: 5; 325 TABLET ORAL at 23:24

## 2019-04-23 RX ADMIN — FAMOTIDINE 20 MG: 20 TABLET ORAL at 17:16

## 2019-04-23 RX ADMIN — METOCLOPRAMIDE 10 MG: 5 INJECTION, SOLUTION INTRAMUSCULAR; INTRAVENOUS at 11:42

## 2019-04-23 RX ADMIN — KETOROLAC TROMETHAMINE 15 MG: 30 INJECTION, SOLUTION INTRAMUSCULAR at 17:16

## 2019-04-23 RX ADMIN — GABAPENTIN 300 MG: 300 CAPSULE ORAL at 17:15

## 2019-04-23 RX ADMIN — METOCLOPRAMIDE 10 MG: 5 INJECTION, SOLUTION INTRAMUSCULAR; INTRAVENOUS at 17:16

## 2019-04-23 RX ADMIN — CEFAZOLIN SODIUM 1000 MG: 1 SOLUTION INTRAVENOUS at 03:22

## 2019-04-24 ENCOUNTER — APPOINTMENT (INPATIENT)
Dept: CT IMAGING | Facility: HOSPITAL | Age: 64
DRG: 707 | End: 2019-04-24
Payer: COMMERCIAL

## 2019-04-24 LAB
ABO GROUP BLD BPU: NORMAL
ABO GROUP BLD BPU: NORMAL
ANION GAP SERPL CALCULATED.3IONS-SCNC: 5 MMOL/L (ref 4–13)
ANION GAP SERPL CALCULATED.3IONS-SCNC: 6 MMOL/L (ref 4–13)
BPU ID: NORMAL
BPU ID: NORMAL
BUN SERPL-MCNC: 4 MG/DL (ref 5–25)
BUN SERPL-MCNC: 7 MG/DL (ref 5–25)
CALCIUM SERPL-MCNC: 8.1 MG/DL (ref 8.3–10.1)
CALCIUM SERPL-MCNC: 8.6 MG/DL (ref 8.3–10.1)
CHLORIDE SERPL-SCNC: 109 MMOL/L (ref 100–108)
CHLORIDE SERPL-SCNC: 113 MMOL/L (ref 100–108)
CO2 SERPL-SCNC: 24 MMOL/L (ref 21–32)
CO2 SERPL-SCNC: 29 MMOL/L (ref 21–32)
CREAT SERPL-MCNC: 0.72 MG/DL (ref 0.6–1.3)
CREAT SERPL-MCNC: 0.76 MG/DL (ref 0.6–1.3)
CROSSMATCH: NORMAL
CROSSMATCH: NORMAL
ERYTHROCYTE [DISTWIDTH] IN BLOOD BY AUTOMATED COUNT: 13.7 % (ref 11.6–15.1)
GFR SERPL CREATININE-BSD FRML MDRD: 97 ML/MIN/1.73SQ M
GFR SERPL CREATININE-BSD FRML MDRD: 99 ML/MIN/1.73SQ M
GLUCOSE SERPL-MCNC: 104 MG/DL (ref 65–140)
GLUCOSE SERPL-MCNC: 112 MG/DL (ref 65–140)
HCT VFR BLD AUTO: 26.3 % (ref 36.5–49.3)
HCT VFR BLD AUTO: 28.8 % (ref 36.5–49.3)
HGB BLD-MCNC: 8.7 G/DL (ref 12–17)
HGB BLD-MCNC: 9.4 G/DL (ref 12–17)
MCH RBC QN AUTO: 31.6 PG (ref 26.8–34.3)
MCHC RBC AUTO-ENTMCNC: 33.1 G/DL (ref 31.4–37.4)
MCV RBC AUTO: 96 FL (ref 82–98)
PLATELET # BLD AUTO: 129 THOUSANDS/UL (ref 149–390)
PMV BLD AUTO: 11.8 FL (ref 8.9–12.7)
POTASSIUM SERPL-SCNC: 3.7 MMOL/L (ref 3.5–5.3)
POTASSIUM SERPL-SCNC: 3.7 MMOL/L (ref 3.5–5.3)
RBC # BLD AUTO: 2.75 MILLION/UL (ref 3.88–5.62)
SODIUM SERPL-SCNC: 143 MMOL/L (ref 136–145)
SODIUM SERPL-SCNC: 143 MMOL/L (ref 136–145)
TROPONIN I SERPL-MCNC: 0.03 NG/ML
UNIT DISPENSE STATUS: NORMAL
UNIT DISPENSE STATUS: NORMAL
UNIT PRODUCT CODE: NORMAL
UNIT PRODUCT CODE: NORMAL
UNIT RH: NORMAL
UNIT RH: NORMAL
WBC # BLD AUTO: 8.71 THOUSAND/UL (ref 4.31–10.16)

## 2019-04-24 PROCEDURE — 84484 ASSAY OF TROPONIN QUANT: CPT | Performed by: UROLOGY

## 2019-04-24 PROCEDURE — 99024 POSTOP FOLLOW-UP VISIT: CPT | Performed by: UROLOGY

## 2019-04-24 PROCEDURE — 85027 COMPLETE CBC AUTOMATED: CPT | Performed by: UROLOGY

## 2019-04-24 PROCEDURE — 99254 IP/OBS CNSLTJ NEW/EST MOD 60: CPT | Performed by: INTERNAL MEDICINE

## 2019-04-24 PROCEDURE — 74177 CT ABD & PELVIS W/CONTRAST: CPT

## 2019-04-24 PROCEDURE — 80048 BASIC METABOLIC PNL TOTAL CA: CPT | Performed by: UROLOGY

## 2019-04-24 PROCEDURE — 85018 HEMOGLOBIN: CPT | Performed by: INTERNAL MEDICINE

## 2019-04-24 PROCEDURE — 85014 HEMATOCRIT: CPT | Performed by: INTERNAL MEDICINE

## 2019-04-24 PROCEDURE — 71275 CT ANGIOGRAPHY CHEST: CPT

## 2019-04-24 RX ORDER — GABAPENTIN 300 MG/1
300 CAPSULE ORAL 2 TIMES DAILY
Status: DISCONTINUED | OUTPATIENT
Start: 2019-04-24 | End: 2019-04-25 | Stop reason: HOSPADM

## 2019-04-24 RX ORDER — POLYETHYLENE GLYCOL 3350 17 G/17G
17 POWDER, FOR SOLUTION ORAL DAILY
Status: DISCONTINUED | OUTPATIENT
Start: 2019-04-24 | End: 2019-04-25 | Stop reason: HOSPADM

## 2019-04-24 RX ORDER — METHOCARBAMOL 500 MG/1
500 TABLET, FILM COATED ORAL EVERY 6 HOURS PRN
Status: DISCONTINUED | OUTPATIENT
Start: 2019-04-24 | End: 2019-04-25 | Stop reason: HOSPADM

## 2019-04-24 RX ORDER — SENNOSIDES 8.6 MG
1 TABLET ORAL
Status: DISCONTINUED | OUTPATIENT
Start: 2019-04-24 | End: 2019-04-25 | Stop reason: HOSPADM

## 2019-04-24 RX ORDER — GABAPENTIN 300 MG/1
600 CAPSULE ORAL
Status: DISCONTINUED | OUTPATIENT
Start: 2019-04-24 | End: 2019-04-25 | Stop reason: HOSPADM

## 2019-04-24 RX ORDER — BISACODYL 10 MG
10 SUPPOSITORY, RECTAL RECTAL DAILY PRN
Status: DISCONTINUED | OUTPATIENT
Start: 2019-04-24 | End: 2019-04-25 | Stop reason: HOSPADM

## 2019-04-24 RX ORDER — ALPRAZOLAM 0.25 MG/1
0.25 TABLET ORAL 3 TIMES DAILY PRN
Status: DISCONTINUED | OUTPATIENT
Start: 2019-04-24 | End: 2019-04-25 | Stop reason: HOSPADM

## 2019-04-24 RX ORDER — LIDOCAINE 50 MG/G
1 PATCH TOPICAL DAILY
Status: DISCONTINUED | OUTPATIENT
Start: 2019-04-24 | End: 2019-04-25 | Stop reason: HOSPADM

## 2019-04-24 RX ADMIN — DEXTROSE AND SODIUM CHLORIDE 50 ML/HR: 5; .9 INJECTION, SOLUTION INTRAVENOUS at 09:34

## 2019-04-24 RX ADMIN — IOHEXOL 100 ML: 350 INJECTION, SOLUTION INTRAVENOUS at 08:00

## 2019-04-24 RX ADMIN — TRAMADOL HYDROCHLORIDE 50 MG: 50 TABLET, COATED ORAL at 17:11

## 2019-04-24 RX ADMIN — GABAPENTIN 600 MG: 300 CAPSULE ORAL at 21:29

## 2019-04-24 RX ADMIN — BACITRACIN, NEOMYCIN, POLYMYXIN B 1 SMALL APPLICATION: 400; 3.5; 5 OINTMENT TOPICAL at 17:04

## 2019-04-24 RX ADMIN — CIPROFLOXACIN HYDROCHLORIDE 250 MG: 250 TABLET, FILM COATED ORAL at 09:35

## 2019-04-24 RX ADMIN — GABAPENTIN 300 MG: 300 CAPSULE ORAL at 09:35

## 2019-04-24 RX ADMIN — CIPROFLOXACIN HYDROCHLORIDE 250 MG: 250 TABLET, FILM COATED ORAL at 21:30

## 2019-04-24 RX ADMIN — LIDOCAINE 1 PATCH: 50 PATCH TOPICAL at 09:27

## 2019-04-24 RX ADMIN — KETOROLAC TROMETHAMINE 15 MG: 30 INJECTION, SOLUTION INTRAMUSCULAR at 05:08

## 2019-04-24 RX ADMIN — HYDROMORPHONE HYDROCHLORIDE 0.2 MG: 1 INJECTION, SOLUTION INTRAMUSCULAR; INTRAVENOUS; SUBCUTANEOUS at 21:04

## 2019-04-24 RX ADMIN — POLYETHYLENE GLYCOL 3350 17 G: 17 POWDER, FOR SOLUTION ORAL at 09:35

## 2019-04-24 RX ADMIN — SIMETHICONE CHEW TAB 80 MG 80 MG: 80 TABLET ORAL at 14:23

## 2019-04-24 RX ADMIN — BACITRACIN, NEOMYCIN, POLYMYXIN B 1 SMALL APPLICATION: 400; 3.5; 5 OINTMENT TOPICAL at 09:35

## 2019-04-24 RX ADMIN — METHOCARBAMOL TABLETS 500 MG: 500 TABLET, COATED ORAL at 17:11

## 2019-04-24 RX ADMIN — HYDROMORPHONE HYDROCHLORIDE 0.2 MG: 1 INJECTION, SOLUTION INTRAMUSCULAR; INTRAVENOUS; SUBCUTANEOUS at 07:26

## 2019-04-24 RX ADMIN — HYDROMORPHONE HYDROCHLORIDE 0.2 MG: 1 INJECTION, SOLUTION INTRAMUSCULAR; INTRAVENOUS; SUBCUTANEOUS at 12:54

## 2019-04-24 RX ADMIN — GABAPENTIN 300 MG: 300 CAPSULE ORAL at 17:04

## 2019-04-24 RX ADMIN — DOCUSATE SODIUM 100 MG: 100 CAPSULE, LIQUID FILLED ORAL at 17:04

## 2019-04-24 RX ADMIN — METHOCARBAMOL TABLETS 500 MG: 500 TABLET, COATED ORAL at 09:35

## 2019-04-24 RX ADMIN — METOCLOPRAMIDE 10 MG: 5 INJECTION, SOLUTION INTRAMUSCULAR; INTRAVENOUS at 17:04

## 2019-04-24 RX ADMIN — METOCLOPRAMIDE 10 MG: 5 INJECTION, SOLUTION INTRAMUSCULAR; INTRAVENOUS at 11:45

## 2019-04-24 RX ADMIN — FAMOTIDINE 20 MG: 20 TABLET ORAL at 17:04

## 2019-04-24 RX ADMIN — SIMETHICONE CHEW TAB 80 MG 80 MG: 80 TABLET ORAL at 07:27

## 2019-04-24 RX ADMIN — METOCLOPRAMIDE 10 MG: 5 INJECTION, SOLUTION INTRAMUSCULAR; INTRAVENOUS at 05:07

## 2019-04-24 RX ADMIN — FAMOTIDINE 20 MG: 20 TABLET ORAL at 09:35

## 2019-04-24 RX ADMIN — DOCUSATE SODIUM 100 MG: 100 CAPSULE, LIQUID FILLED ORAL at 09:36

## 2019-04-24 RX ADMIN — SENNOSIDES 8.6 MG: 8.6 TABLET, FILM COATED ORAL at 21:30

## 2019-04-25 VITALS
RESPIRATION RATE: 16 BRPM | HEART RATE: 81 BPM | SYSTOLIC BLOOD PRESSURE: 111 MMHG | HEIGHT: 68 IN | TEMPERATURE: 96 F | DIASTOLIC BLOOD PRESSURE: 68 MMHG | BODY MASS INDEX: 18.61 KG/M2 | WEIGHT: 122.8 LBS | OXYGEN SATURATION: 96 %

## 2019-04-25 PROBLEM — Z90.79 S/P PROSTATECTOMY: Status: RESOLVED | Noted: 2019-04-23 | Resolved: 2019-04-25

## 2019-04-25 LAB
ANION GAP SERPL CALCULATED.3IONS-SCNC: 5 MMOL/L (ref 4–13)
BUN SERPL-MCNC: 7 MG/DL (ref 5–25)
CALCIUM SERPL-MCNC: 8.5 MG/DL (ref 8.3–10.1)
CHLORIDE SERPL-SCNC: 111 MMOL/L (ref 100–108)
CO2 SERPL-SCNC: 28 MMOL/L (ref 21–32)
CREAT SERPL-MCNC: 0.73 MG/DL (ref 0.6–1.3)
ERYTHROCYTE [DISTWIDTH] IN BLOOD BY AUTOMATED COUNT: 13.4 % (ref 11.6–15.1)
GFR SERPL CREATININE-BSD FRML MDRD: 99 ML/MIN/1.73SQ M
GLUCOSE SERPL-MCNC: 105 MG/DL (ref 65–140)
HCT VFR BLD AUTO: 27.7 % (ref 36.5–49.3)
HGB BLD-MCNC: 9.3 G/DL (ref 12–17)
MCH RBC QN AUTO: 31.7 PG (ref 26.8–34.3)
MCHC RBC AUTO-ENTMCNC: 33.6 G/DL (ref 31.4–37.4)
MCV RBC AUTO: 95 FL (ref 82–98)
PLATELET # BLD AUTO: 136 THOUSANDS/UL (ref 149–390)
PMV BLD AUTO: 11.2 FL (ref 8.9–12.7)
POTASSIUM SERPL-SCNC: 3.6 MMOL/L (ref 3.5–5.3)
RBC # BLD AUTO: 2.93 MILLION/UL (ref 3.88–5.62)
SODIUM SERPL-SCNC: 144 MMOL/L (ref 136–145)
WBC # BLD AUTO: 9.43 THOUSAND/UL (ref 4.31–10.16)

## 2019-04-25 PROCEDURE — NC001 PR NO CHARGE: Performed by: PHYSICIAN ASSISTANT

## 2019-04-25 PROCEDURE — 80048 BASIC METABOLIC PNL TOTAL CA: CPT | Performed by: INTERNAL MEDICINE

## 2019-04-25 PROCEDURE — 99024 POSTOP FOLLOW-UP VISIT: CPT | Performed by: PHYSICIAN ASSISTANT

## 2019-04-25 PROCEDURE — 85027 COMPLETE CBC AUTOMATED: CPT | Performed by: INTERNAL MEDICINE

## 2019-04-25 RX ORDER — OXYCODONE HYDROCHLORIDE AND ACETAMINOPHEN 5; 325 MG/1; MG/1
1 TABLET ORAL EVERY 4 HOURS PRN
Qty: 20 TABLET | Refills: 0 | Status: SHIPPED | OUTPATIENT
Start: 2019-04-25 | End: 2020-04-30 | Stop reason: ALTCHOICE

## 2019-04-25 RX ORDER — OXYBUTYNIN CHLORIDE 5 MG/1
5 TABLET ORAL 4 TIMES DAILY PRN
Qty: 30 TABLET | Refills: 0 | Status: SHIPPED | OUTPATIENT
Start: 2019-04-25 | End: 2020-04-30 | Stop reason: ALTCHOICE

## 2019-04-25 RX ORDER — CIPROFLOXACIN 250 MG/1
250 TABLET, FILM COATED ORAL EVERY 12 HOURS SCHEDULED
Qty: 14 TABLET | Refills: 0 | Status: SHIPPED | OUTPATIENT
Start: 2019-04-25 | End: 2019-04-30 | Stop reason: SDUPTHER

## 2019-04-25 RX ORDER — DOCUSATE SODIUM 100 MG/1
100 CAPSULE, LIQUID FILLED ORAL 2 TIMES DAILY
Qty: 30 CAPSULE | Refills: 0 | Status: SHIPPED | OUTPATIENT
Start: 2019-04-25

## 2019-04-25 RX ADMIN — METOCLOPRAMIDE 10 MG: 5 INJECTION, SOLUTION INTRAMUSCULAR; INTRAVENOUS at 00:25

## 2019-04-25 RX ADMIN — POLYETHYLENE GLYCOL 3350 17 G: 17 POWDER, FOR SOLUTION ORAL at 08:38

## 2019-04-25 RX ADMIN — CIPROFLOXACIN HYDROCHLORIDE 250 MG: 250 TABLET, FILM COATED ORAL at 08:38

## 2019-04-25 RX ADMIN — FAMOTIDINE 20 MG: 20 TABLET ORAL at 08:38

## 2019-04-25 RX ADMIN — BACITRACIN, NEOMYCIN, POLYMYXIN B 1 SMALL APPLICATION: 400; 3.5; 5 OINTMENT TOPICAL at 08:38

## 2019-04-25 RX ADMIN — GABAPENTIN 300 MG: 300 CAPSULE ORAL at 08:38

## 2019-04-25 RX ADMIN — METOCLOPRAMIDE 10 MG: 5 INJECTION, SOLUTION INTRAMUSCULAR; INTRAVENOUS at 05:22

## 2019-04-25 RX ADMIN — SIMETHICONE CHEW TAB 80 MG 80 MG: 80 TABLET ORAL at 10:46

## 2019-04-25 RX ADMIN — DOCUSATE SODIUM 100 MG: 100 CAPSULE, LIQUID FILLED ORAL at 08:38

## 2019-04-25 RX ADMIN — OXYCODONE HYDROCHLORIDE AND ACETAMINOPHEN 1 TABLET: 5; 325 TABLET ORAL at 05:27

## 2019-04-29 ENCOUNTER — TELEPHONE (OUTPATIENT)
Dept: UROLOGY | Facility: MEDICAL CENTER | Age: 64
End: 2019-04-29

## 2019-04-30 ENCOUNTER — OFFICE VISIT (OUTPATIENT)
Dept: UROLOGY | Facility: MEDICAL CENTER | Age: 64
End: 2019-04-30

## 2019-04-30 VITALS
SYSTOLIC BLOOD PRESSURE: 98 MMHG | WEIGHT: 122 LBS | BODY MASS INDEX: 18.49 KG/M2 | HEART RATE: 48 BPM | DIASTOLIC BLOOD PRESSURE: 60 MMHG | HEIGHT: 68 IN

## 2019-04-30 DIAGNOSIS — C61 MALIGNANT NEOPLASM OF PROSTATE (HCC): Primary | ICD-10-CM

## 2019-04-30 DIAGNOSIS — Z90.79 HISTORY OF ROBOT-ASSISTED LAPAROSCOPIC RADICAL PROSTATECTOMY: ICD-10-CM

## 2019-04-30 LAB — CREAT FLD-MCNC: 0.72 MG/DL

## 2019-04-30 PROCEDURE — 99024 POSTOP FOLLOW-UP VISIT: CPT | Performed by: UROLOGY

## 2019-04-30 PROCEDURE — 82570 ASSAY OF URINE CREATININE: CPT | Performed by: UROLOGY

## 2019-04-30 RX ORDER — CIPROFLOXACIN 250 MG/1
250 TABLET, FILM COATED ORAL EVERY 12 HOURS SCHEDULED
Qty: 14 TABLET | Refills: 0 | Status: SHIPPED | OUTPATIENT
Start: 2019-04-30 | End: 2019-05-07

## 2019-05-03 ENCOUNTER — CLINICAL SUPPORT (OUTPATIENT)
Dept: UROLOGY | Facility: MEDICAL CENTER | Age: 64
End: 2019-05-03

## 2019-05-03 VITALS
DIASTOLIC BLOOD PRESSURE: 58 MMHG | WEIGHT: 123.8 LBS | HEART RATE: 75 BPM | BODY MASS INDEX: 18.76 KG/M2 | HEIGHT: 68 IN | SYSTOLIC BLOOD PRESSURE: 98 MMHG

## 2019-05-03 DIAGNOSIS — Z90.79 HISTORY OF ROBOT-ASSISTED LAPAROSCOPIC RADICAL PROSTATECTOMY: Primary | ICD-10-CM

## 2019-05-03 DIAGNOSIS — C61 MALIGNANT NEOPLASM OF PROSTATE (HCC): ICD-10-CM

## 2019-05-03 PROCEDURE — 99024 POSTOP FOLLOW-UP VISIT: CPT

## 2019-05-08 ENCOUNTER — TELEPHONE (OUTPATIENT)
Dept: UROLOGY | Facility: MEDICAL CENTER | Age: 64
End: 2019-05-08

## 2019-05-08 ENCOUNTER — TELEPHONE (OUTPATIENT)
Dept: UROLOGY | Facility: AMBULATORY SURGERY CENTER | Age: 64
End: 2019-05-08

## 2019-05-08 ENCOUNTER — OFFICE VISIT (OUTPATIENT)
Dept: UROLOGY | Facility: MEDICAL CENTER | Age: 64
End: 2019-05-08

## 2019-05-08 VITALS
HEART RATE: 48 BPM | BODY MASS INDEX: 18.49 KG/M2 | SYSTOLIC BLOOD PRESSURE: 90 MMHG | WEIGHT: 122 LBS | DIASTOLIC BLOOD PRESSURE: 58 MMHG | HEIGHT: 68 IN

## 2019-05-08 DIAGNOSIS — Z90.79 HISTORY OF ROBOT-ASSISTED LAPAROSCOPIC RADICAL PROSTATECTOMY: ICD-10-CM

## 2019-05-08 DIAGNOSIS — C61 PROSTATE CANCER (HCC): Primary | ICD-10-CM

## 2019-05-08 PROCEDURE — 99024 POSTOP FOLLOW-UP VISIT: CPT | Performed by: UROLOGY

## 2019-05-16 ENCOUNTER — APPOINTMENT (OUTPATIENT)
Dept: LAB | Facility: HOSPITAL | Age: 64
End: 2019-05-16
Payer: COMMERCIAL

## 2019-05-16 ENCOUNTER — TRANSCRIBE ORDERS (OUTPATIENT)
Dept: ADMINISTRATIVE | Facility: HOSPITAL | Age: 64
End: 2019-05-16

## 2019-05-16 DIAGNOSIS — C61 PROSTATE CANCER (HCC): ICD-10-CM

## 2019-05-16 LAB
ERYTHROCYTE [DISTWIDTH] IN BLOOD BY AUTOMATED COUNT: 15.8 % (ref 11.5–14.5)
HCT VFR BLD AUTO: 41.9 % (ref 42–47)
HGB BLD-MCNC: 13.8 G/DL (ref 14–18)
MCH RBC QN AUTO: 31.8 PG (ref 26–34)
MCHC RBC AUTO-ENTMCNC: 32.9 G/DL (ref 31–37)
MCV RBC AUTO: 97 FL (ref 81–99)
PLATELET # BLD AUTO: 324 THOUSANDS/UL (ref 149–390)
PMV BLD AUTO: 9 FL (ref 8.6–11.7)
RBC # BLD AUTO: 4.33 MILLION/UL (ref 4.3–5.9)
WBC # BLD AUTO: 10.4 THOUSAND/UL (ref 4.8–10.8)

## 2019-05-16 PROCEDURE — 36415 COLL VENOUS BLD VENIPUNCTURE: CPT

## 2019-05-16 PROCEDURE — 85027 COMPLETE CBC AUTOMATED: CPT

## 2019-05-17 ENCOUNTER — TELEPHONE (OUTPATIENT)
Dept: UROLOGY | Facility: CLINIC | Age: 64
End: 2019-05-17

## 2019-05-29 ENCOUNTER — TRANSCRIBE ORDERS (OUTPATIENT)
Dept: ADMINISTRATIVE | Facility: HOSPITAL | Age: 64
End: 2019-05-29

## 2019-05-29 DIAGNOSIS — M54.5 LOW BACK PAIN, UNSPECIFIED BACK PAIN LATERALITY, UNSPECIFIED CHRONICITY, WITH SCIATICA PRESENCE UNSPECIFIED: Primary | ICD-10-CM

## 2019-06-03 ENCOUNTER — TELEPHONE (OUTPATIENT)
Dept: UROLOGY | Facility: CLINIC | Age: 64
End: 2019-06-03

## 2019-06-04 ENCOUNTER — HOSPITAL ENCOUNTER (OUTPATIENT)
Dept: MRI IMAGING | Facility: HOSPITAL | Age: 64
Discharge: HOME | End: 2019-06-04
Payer: COMMERCIAL

## 2019-06-04 DIAGNOSIS — M54.5 LOW BACK PAIN, UNSPECIFIED BACK PAIN LATERALITY, UNSPECIFIED CHRONICITY, WITH SCIATICA PRESENCE UNSPECIFIED: ICD-10-CM

## 2019-06-04 PROCEDURE — 72148 MRI LUMBAR SPINE W/O DYE: CPT

## 2019-06-10 ENCOUNTER — OFFICE VISIT (OUTPATIENT)
Dept: UROLOGY | Facility: MEDICAL CENTER | Age: 64
End: 2019-06-10
Payer: COMMERCIAL

## 2019-06-10 VITALS
DIASTOLIC BLOOD PRESSURE: 60 MMHG | BODY MASS INDEX: 19.01 KG/M2 | HEART RATE: 68 BPM | WEIGHT: 125 LBS | SYSTOLIC BLOOD PRESSURE: 100 MMHG

## 2019-06-10 DIAGNOSIS — C61 PROSTATE CANCER (HCC): Primary | ICD-10-CM

## 2019-06-10 DIAGNOSIS — N39.3 STRESS INCONTINENCE, MALE: ICD-10-CM

## 2019-06-10 DIAGNOSIS — Z90.79 HISTORY OF ROBOT-ASSISTED LAPAROSCOPIC RADICAL PROSTATECTOMY: ICD-10-CM

## 2019-06-10 LAB
POST-VOID RESIDUAL VOLUME, ML POC: 23 ML
SL AMB  POCT GLUCOSE, UA: ABNORMAL
SL AMB LEUKOCYTE ESTERASE,UA: ABNORMAL
SL AMB POCT BILIRUBIN,UA: ABNORMAL
SL AMB POCT BLOOD,UA: ABNORMAL
SL AMB POCT CLARITY,UA: CLEAR
SL AMB POCT COLOR,UA: YELLOW
SL AMB POCT KETONES,UA: ABNORMAL
SL AMB POCT NITRITE,UA: ABNORMAL
SL AMB POCT PH,UA: 7
SL AMB POCT SPECIFIC GRAVITY,UA: 1.02
SL AMB POCT URINE PROTEIN: ABNORMAL
SL AMB POCT UROBILINOGEN: 0.2

## 2019-06-10 PROCEDURE — 99024 POSTOP FOLLOW-UP VISIT: CPT | Performed by: UROLOGY

## 2019-06-10 PROCEDURE — 51798 US URINE CAPACITY MEASURE: CPT | Performed by: UROLOGY

## 2019-06-10 PROCEDURE — 81003 URINALYSIS AUTO W/O SCOPE: CPT | Performed by: UROLOGY

## 2019-06-10 RX ORDER — METHOCARBAMOL 500 MG/1
500 TABLET, FILM COATED ORAL 2 TIMES DAILY
COMMUNITY
Start: 2019-05-28 | End: 2022-07-08 | Stop reason: SDUPTHER

## 2019-06-18 ENCOUNTER — TELEPHONE (OUTPATIENT)
Dept: UROLOGY | Facility: MEDICAL CENTER | Age: 64
End: 2019-06-18

## 2019-06-19 ENCOUNTER — TRANSCRIBE ORDERS (OUTPATIENT)
Dept: ADMINISTRATIVE | Facility: HOSPITAL | Age: 64
End: 2019-06-19

## 2019-06-19 ENCOUNTER — APPOINTMENT (OUTPATIENT)
Dept: LAB | Facility: HOSPITAL | Age: 64
End: 2019-06-19
Attending: INTERNAL MEDICINE
Payer: COMMERCIAL

## 2019-06-19 DIAGNOSIS — E78.5 HYPERLIPIDEMIA, UNSPECIFIED HYPERLIPIDEMIA TYPE: ICD-10-CM

## 2019-06-19 DIAGNOSIS — M19.90 ARTHRITIS: ICD-10-CM

## 2019-06-19 DIAGNOSIS — M19.90 ARTHRITIS: Primary | ICD-10-CM

## 2019-06-19 DIAGNOSIS — I10 HYPERTENSION, UNSPECIFIED TYPE: ICD-10-CM

## 2019-06-19 LAB
ALBUMIN SERPL BCP-MCNC: 4.3 G/DL (ref 3.5–5.7)
ALP SERPL-CCNC: 64 U/L (ref 55–165)
ALT SERPL W P-5'-P-CCNC: 13 U/L (ref 7–52)
ANION GAP SERPL CALCULATED.3IONS-SCNC: 4 MMOL/L (ref 4–13)
AST SERPL W P-5'-P-CCNC: 14 U/L (ref 13–39)
BILIRUB SERPL-MCNC: 0.4 MG/DL (ref 0.2–1)
BUN SERPL-MCNC: 22 MG/DL (ref 7–25)
CALCIUM SERPL-MCNC: 9.9 MG/DL (ref 8.6–10.5)
CHLORIDE SERPL-SCNC: 104 MMOL/L (ref 98–107)
CHOLEST SERPL-MCNC: 185 MG/DL (ref 0–200)
CO2 SERPL-SCNC: 31 MMOL/L (ref 21–31)
CREAT SERPL-MCNC: 0.78 MG/DL (ref 0.7–1.3)
ERYTHROCYTE [DISTWIDTH] IN BLOOD BY AUTOMATED COUNT: 13.7 % (ref 11.5–14.5)
GFR SERPL CREATININE-BSD FRML MDRD: 96 ML/MIN/1.73SQ M
GLUCOSE P FAST SERPL-MCNC: 98 MG/DL (ref 65–99)
HCT VFR BLD AUTO: 45.7 % (ref 42–47)
HDLC SERPL-MCNC: 46 MG/DL (ref 40–60)
HGB BLD-MCNC: 15.3 G/DL (ref 14–18)
LDLC SERPL CALC-MCNC: 118 MG/DL (ref 0–100)
MCH RBC QN AUTO: 31.9 PG (ref 26–34)
MCHC RBC AUTO-ENTMCNC: 33.5 G/DL (ref 31–37)
MCV RBC AUTO: 95 FL (ref 81–99)
NONHDLC SERPL-MCNC: 139 MG/DL
PLATELET # BLD AUTO: 214 THOUSANDS/UL (ref 149–390)
PMV BLD AUTO: 9.7 FL (ref 8.6–11.7)
POTASSIUM SERPL-SCNC: 4.6 MMOL/L (ref 3.5–5.5)
PROT SERPL-MCNC: 6.7 G/DL (ref 6.4–8.9)
RBC # BLD AUTO: 4.8 MILLION/UL (ref 4.3–5.9)
SODIUM SERPL-SCNC: 139 MMOL/L (ref 134–143)
TRIGL SERPL-MCNC: 105 MG/DL (ref 44–166)
WBC # BLD AUTO: 8.2 THOUSAND/UL (ref 4.8–10.8)

## 2019-06-19 PROCEDURE — 85027 COMPLETE CBC AUTOMATED: CPT

## 2019-06-19 PROCEDURE — 36415 COLL VENOUS BLD VENIPUNCTURE: CPT

## 2019-06-19 PROCEDURE — 80053 COMPREHEN METABOLIC PANEL: CPT

## 2019-06-19 PROCEDURE — 80061 LIPID PANEL: CPT

## 2019-07-19 ENCOUNTER — APPOINTMENT (OUTPATIENT)
Dept: LAB | Facility: HOSPITAL | Age: 64
End: 2019-07-19
Payer: COMMERCIAL

## 2019-07-19 DIAGNOSIS — C61 PROSTATE CANCER (HCC): ICD-10-CM

## 2019-07-19 LAB — PSA SERPL-MCNC: <0.1 NG/ML (ref 0–4)

## 2019-07-19 PROCEDURE — 84153 ASSAY OF PSA TOTAL: CPT

## 2019-07-26 ENCOUNTER — OFFICE VISIT (OUTPATIENT)
Dept: UROLOGY | Facility: MEDICAL CENTER | Age: 64
End: 2019-07-26
Payer: COMMERCIAL

## 2019-07-26 VITALS
SYSTOLIC BLOOD PRESSURE: 102 MMHG | DIASTOLIC BLOOD PRESSURE: 60 MMHG | BODY MASS INDEX: 18.49 KG/M2 | HEART RATE: 79 BPM | HEIGHT: 68 IN | WEIGHT: 122 LBS

## 2019-07-26 DIAGNOSIS — Z90.79 HISTORY OF ROBOT-ASSISTED LAPAROSCOPIC RADICAL PROSTATECTOMY: ICD-10-CM

## 2019-07-26 DIAGNOSIS — N39.3 STRESS INCONTINENCE, MALE: ICD-10-CM

## 2019-07-26 DIAGNOSIS — C61 PROSTATE CANCER (HCC): Primary | ICD-10-CM

## 2019-07-26 LAB
SL AMB  POCT GLUCOSE, UA: NORMAL
SL AMB LEUKOCYTE ESTERASE,UA: NORMAL
SL AMB POCT BILIRUBIN,UA: NORMAL
SL AMB POCT BLOOD,UA: NORMAL
SL AMB POCT CLARITY,UA: CLEAR
SL AMB POCT COLOR,UA: YELLOW
SL AMB POCT KETONES,UA: NORMAL
SL AMB POCT NITRITE,UA: NORMAL
SL AMB POCT PH,UA: 7
SL AMB POCT SPECIFIC GRAVITY,UA: 1.02
SL AMB POCT URINE PROTEIN: NORMAL
SL AMB POCT UROBILINOGEN: 0.2

## 2019-07-26 PROCEDURE — 81003 URINALYSIS AUTO W/O SCOPE: CPT | Performed by: UROLOGY

## 2019-07-26 PROCEDURE — 99214 OFFICE O/P EST MOD 30 MIN: CPT | Performed by: UROLOGY

## 2019-07-26 RX ORDER — ASPIRIN 81 MG/1
81 TABLET ORAL DAILY
COMMUNITY

## 2019-07-26 NOTE — PROGRESS NOTES
Assessment/Plan:      Diagnoses and all orders for this visit:    Prostate cancer ([facility] [address] )  -     POCT urine dip auto non-scope  -     PSA Total, Diagnostic; Future    History of robot-assisted laparoscopic radical prostatectomy    Stress incontinence, male    Other orders  -     aspirin (ECOTRIN LOW STRENGTH) 81 mg EC tablet; Take 81 mg by mouth daily          Subjective:  No complaints     Patient ID: Char Motley is a 59 y o  male  HPI    He is about 3 months after his laparoscopic robotic radical prostatectomy which he continues to recover well from  His Whitlock catheter has been out for 8 weeks and he is voiding with a good urinary stream but still has some obligated control issues with incontinence more stress related  He states that he is doing his Kegel exercises as directed, and has been noting some gradual improvement of regaining continence  He denies any dysuria, difficulty generating urinary stream, hematuria, flank or abdominal pains  Review of Systems   Constitutional: Negative  HENT: Negative  Eyes: Negative  Respiratory: Negative  Cardiovascular: Negative  Gastrointestinal: Negative  Endocrine: Negative  Genitourinary: Negative  Musculoskeletal: Negative  Skin: Negative  Allergic/Immunologic: Negative  Neurological: Negative  Hematological: Negative  Psychiatric/Behavioral: Negative  Objective:     Physical Exam   Constitutional: He is oriented to person, place, and time  He appears well-developed and well-nourished  No distress  HENT:   Head: Normocephalic and atraumatic  Nose: Nose normal    Mouth/Throat: Oropharynx is clear and moist    Eyes: Pupils are equal, round, and reactive to light  Conjunctivae and EOM are normal  No scleral icterus  Neck: Normal range of motion  Neck supple  Cardiovascular: Normal rate, regular rhythm, normal heart sounds and intact distal pulses  No murmur heard    Pulmonary/Chest: Effort normal and breath sounds normal  No respiratory distress  He has no wheezes  He has no rales  Abdominal: Soft  Bowel sounds are normal  He exhibits no distension and no mass  There is no tenderness  Musculoskeletal: Normal range of motion  He exhibits no edema or tenderness  Lymphadenopathy:     He has no cervical adenopathy  Neurological: He is alert and oriented to person, place, and time  No cranial nerve deficit  Skin: Skin is warm and dry  No rash noted  No erythema  No pallor  Psychiatric: He has a normal mood and affect  His behavior is normal  Judgment and thought content normal    Nursing note and vitals reviewed        PSA from 07/19/2019 is less than 0 1

## 2019-10-08 ENCOUNTER — TRANSCRIBE ORDERS (OUTPATIENT)
Dept: ADMINISTRATIVE | Facility: HOSPITAL | Age: 64
End: 2019-10-08

## 2019-10-08 DIAGNOSIS — Z87.891 PERSONAL HISTORY OF TOBACCO USE, PRESENTING HAZARDS TO HEALTH: Primary | ICD-10-CM

## 2019-10-11 ENCOUNTER — HOSPITAL ENCOUNTER (OUTPATIENT)
Dept: CT IMAGING | Facility: HOSPITAL | Age: 64
Discharge: HOME | End: 2019-10-11
Attending: INTERNAL MEDICINE
Payer: COMMERCIAL

## 2019-10-11 DIAGNOSIS — Z87.891 PERSONAL HISTORY OF TOBACCO USE, PRESENTING HAZARDS TO HEALTH: ICD-10-CM

## 2019-10-11 PROCEDURE — G0297 LDCT FOR LUNG CA SCREEN: HCPCS

## 2019-10-23 ENCOUNTER — APPOINTMENT (OUTPATIENT)
Dept: LAB | Facility: HOSPITAL | Age: 64
End: 2019-10-23
Payer: COMMERCIAL

## 2019-10-23 DIAGNOSIS — C61 PROSTATE CANCER (HCC): ICD-10-CM

## 2019-10-23 LAB — PSA SERPL-MCNC: <0.1 NG/ML (ref 0–4)

## 2019-10-23 PROCEDURE — 84153 ASSAY OF PSA TOTAL: CPT

## 2019-10-30 ENCOUNTER — OFFICE VISIT (OUTPATIENT)
Dept: UROLOGY | Facility: MEDICAL CENTER | Age: 64
End: 2019-10-30
Payer: COMMERCIAL

## 2019-10-30 VITALS
DIASTOLIC BLOOD PRESSURE: 70 MMHG | WEIGHT: 131 LBS | HEIGHT: 68 IN | BODY MASS INDEX: 19.85 KG/M2 | SYSTOLIC BLOOD PRESSURE: 118 MMHG | HEART RATE: 65 BPM

## 2019-10-30 DIAGNOSIS — N39.3 STRESS INCONTINENCE, MALE: ICD-10-CM

## 2019-10-30 DIAGNOSIS — C61 PROSTATE CANCER (HCC): Primary | ICD-10-CM

## 2019-10-30 DIAGNOSIS — Z90.79 HISTORY OF ROBOT-ASSISTED LAPAROSCOPIC RADICAL PROSTATECTOMY: ICD-10-CM

## 2019-10-30 LAB
POST-VOID RESIDUAL VOLUME, ML POC: 20 ML
SL AMB  POCT GLUCOSE, UA: NORMAL
SL AMB LEUKOCYTE ESTERASE,UA: NORMAL
SL AMB POCT BILIRUBIN,UA: NORMAL
SL AMB POCT BLOOD,UA: NORMAL
SL AMB POCT CLARITY,UA: CLEAR
SL AMB POCT COLOR,UA: YELLOW
SL AMB POCT KETONES,UA: NORMAL
SL AMB POCT NITRITE,UA: NORMAL
SL AMB POCT PH,UA: 7.5
SL AMB POCT SPECIFIC GRAVITY,UA: 1.02
SL AMB POCT URINE PROTEIN: NORMAL
SL AMB POCT UROBILINOGEN: 1

## 2019-10-30 PROCEDURE — 81003 URINALYSIS AUTO W/O SCOPE: CPT | Performed by: UROLOGY

## 2019-10-30 PROCEDURE — 51798 US URINE CAPACITY MEASURE: CPT | Performed by: UROLOGY

## 2019-10-30 PROCEDURE — 99214 OFFICE O/P EST MOD 30 MIN: CPT | Performed by: UROLOGY

## 2019-10-30 NOTE — PROGRESS NOTES
Assessment/Plan:      Diagnoses and all orders for this visit:    Prostate cancer ([facility] [address] )  -     POCT urine dip auto non-scope  -     POCT Measure PVR  -     PSA Total, Diagnostic; Future    History of robot-assisted laparoscopic radical prostatectomy  -     POCT urine dip auto non-scope  -     POCT Measure PVR  -     PSA Total, Diagnostic; Future    Stress incontinence, male  -     POCT urine dip auto non-scope  -     POCT Measure PVR          Subjective:  No complaints     Patient ID: Cirilo Duncan is a 59 y o  male  HPI  He is about 6 months after his laparoscopic robotic radical prostatectomy which he continues to recover well from  [facility] Whitlock catheter has been out for 8 weeks and he is voiding with a good urinary stream but still has some obligated control issues with incontinence more stress related  Jeevan Guo states that he is doing his Kegel exercises as directed, and has been noting significant improvement in regaining continence  He uses maybe 1-2 non saturated pads per day  Jeevan Guo denies any dysuria, difficulty generating urinary stream, hematuria, flank or abdominal pains  Review of Systems   Constitutional: Negative  HENT: Negative  Eyes: Negative  Respiratory: Negative  Cardiovascular: Negative  Gastrointestinal: Negative  Endocrine: Negative  Genitourinary: Negative  Musculoskeletal: Negative  Skin: Negative  Allergic/Immunologic: Negative  Neurological: Negative  Hematological: Negative  Psychiatric/Behavioral: Negative  Objective:     Physical Exam   Constitutional: He is oriented to person, place, and time  He appears well-developed and well-nourished  No distress  HENT:   Head: Normocephalic and atraumatic  Nose: Nose normal    Mouth/Throat: Oropharynx is clear and moist    Eyes: Pupils are equal, round, and reactive to light  Conjunctivae and EOM are normal  No scleral icterus  Neck: Normal range of motion  Neck supple     Cardiovascular: Normal rate, regular rhythm, normal heart sounds and intact distal pulses  No murmur heard  Pulmonary/Chest: Effort normal and breath sounds normal  No respiratory distress  He has no wheezes  He has no rales  Abdominal: Soft  Bowel sounds are normal  He exhibits no distension and no mass  There is no tenderness  Musculoskeletal: Normal range of motion  He exhibits no edema or tenderness  Lymphadenopathy:     He has no cervical adenopathy  Neurological: He is alert and oriented to person, place, and time  No cranial nerve deficit  Skin: Skin is warm and dry  No rash noted  No erythema  No pallor  Psychiatric: He has a normal mood and affect  His behavior is normal  Judgment and thought content normal    Nursing note and vitals reviewed  PSA Total, Diagnostic    Ref Range & Units 10/23/19  7:33 AM 7/19/19  7:22 AM 1/25/19  6:50 AM   PSA, Diagnostic 0 0 - 4 0 ng/mL <0 1                POCT Measure PVR       Ref Range & Units 10/30/19  2:03 PM 6/10/19  8:37 AM   POST-VOID RESIDUAL VOLUME, ML POC mL 20

## 2019-12-06 ENCOUNTER — TRANSCRIBE ORDERS (OUTPATIENT)
Dept: LAB | Facility: HOSPITAL | Age: 64
End: 2019-12-06

## 2019-12-06 ENCOUNTER — APPOINTMENT (OUTPATIENT)
Dept: LAB | Facility: HOSPITAL | Age: 64
End: 2019-12-06
Attending: INTERNAL MEDICINE
Payer: COMMERCIAL

## 2019-12-06 DIAGNOSIS — E78.5 HYPERLIPIDEMIA, UNSPECIFIED HYPERLIPIDEMIA TYPE: ICD-10-CM

## 2019-12-06 DIAGNOSIS — I25.10 ASCVD (ARTERIOSCLEROTIC CARDIOVASCULAR DISEASE): Primary | ICD-10-CM

## 2019-12-06 DIAGNOSIS — I25.10 ASCVD (ARTERIOSCLEROTIC CARDIOVASCULAR DISEASE): ICD-10-CM

## 2019-12-06 DIAGNOSIS — I10 HYPERTENSION, ESSENTIAL: ICD-10-CM

## 2019-12-06 LAB
ALBUMIN SERPL BCP-MCNC: 4.3 G/DL (ref 3.5–5.7)
ALP SERPL-CCNC: 61 U/L (ref 55–165)
ALT SERPL W P-5'-P-CCNC: 13 U/L (ref 7–52)
ANION GAP SERPL CALCULATED.3IONS-SCNC: 5 MMOL/L (ref 4–13)
AST SERPL W P-5'-P-CCNC: 14 U/L (ref 13–39)
BILIRUB SERPL-MCNC: 0.6 MG/DL (ref 0.2–1)
BUN SERPL-MCNC: 13 MG/DL (ref 7–25)
CALCIUM SERPL-MCNC: 9.2 MG/DL (ref 8.6–10.5)
CHLORIDE SERPL-SCNC: 104 MMOL/L (ref 98–107)
CHOLEST SERPL-MCNC: 118 MG/DL (ref 0–200)
CO2 SERPL-SCNC: 30 MMOL/L (ref 21–31)
CREAT SERPL-MCNC: 0.95 MG/DL (ref 0.7–1.3)
ERYTHROCYTE [DISTWIDTH] IN BLOOD BY AUTOMATED COUNT: 13.3 % (ref 11.5–14.5)
GFR SERPL CREATININE-BSD FRML MDRD: 84 ML/MIN/1.73SQ M
GLUCOSE P FAST SERPL-MCNC: 93 MG/DL (ref 65–99)
HCT VFR BLD AUTO: 43.9 % (ref 42–47)
HDLC SERPL-MCNC: 44 MG/DL
HGB BLD-MCNC: 14.8 G/DL (ref 14–18)
LDLC SERPL CALC-MCNC: 50 MG/DL (ref 0–100)
MCH RBC QN AUTO: 31.5 PG (ref 26–34)
MCHC RBC AUTO-ENTMCNC: 33.7 G/DL (ref 31–37)
MCV RBC AUTO: 94 FL (ref 81–99)
NONHDLC SERPL-MCNC: 74 MG/DL
PLATELET # BLD AUTO: 198 THOUSANDS/UL (ref 149–390)
PMV BLD AUTO: 9.7 FL (ref 8.6–11.7)
POTASSIUM SERPL-SCNC: 3.9 MMOL/L (ref 3.5–5.5)
PROT SERPL-MCNC: 6.2 G/DL (ref 6.4–8.9)
RBC # BLD AUTO: 4.69 MILLION/UL (ref 4.3–5.9)
SODIUM SERPL-SCNC: 139 MMOL/L (ref 134–143)
TRIGL SERPL-MCNC: 119 MG/DL (ref 44–166)
WBC # BLD AUTO: 9.2 THOUSAND/UL (ref 4.8–10.8)

## 2019-12-06 PROCEDURE — 80061 LIPID PANEL: CPT

## 2019-12-06 PROCEDURE — 80053 COMPREHEN METABOLIC PANEL: CPT

## 2019-12-06 PROCEDURE — 36415 COLL VENOUS BLD VENIPUNCTURE: CPT

## 2019-12-06 PROCEDURE — 85027 COMPLETE CBC AUTOMATED: CPT

## 2020-01-09 ENCOUNTER — CONSULT (OUTPATIENT)
Dept: CARDIOLOGY CLINIC | Facility: CLINIC | Age: 65
End: 2020-01-09
Payer: COMMERCIAL

## 2020-01-09 VITALS
HEART RATE: 68 BPM | BODY MASS INDEX: 20.31 KG/M2 | SYSTOLIC BLOOD PRESSURE: 102 MMHG | WEIGHT: 134 LBS | DIASTOLIC BLOOD PRESSURE: 72 MMHG | HEIGHT: 68 IN

## 2020-01-09 DIAGNOSIS — I25.10 CORONARY ARTERY DISEASE INVOLVING NATIVE CORONARY ARTERY OF NATIVE HEART WITHOUT ANGINA PECTORIS: Primary | ICD-10-CM

## 2020-01-09 DIAGNOSIS — Z01.810 PRE-OPERATIVE CARDIOVASCULAR EXAMINATION: ICD-10-CM

## 2020-01-09 DIAGNOSIS — E78.5 DYSLIPIDEMIA: ICD-10-CM

## 2020-01-09 PROCEDURE — 99244 OFF/OP CNSLTJ NEW/EST MOD 40: CPT | Performed by: INTERNAL MEDICINE

## 2020-01-09 PROCEDURE — 93000 ELECTROCARDIOGRAM COMPLETE: CPT | Performed by: INTERNAL MEDICINE

## 2020-01-09 NOTE — PROGRESS NOTES
Patient ID: Walter Nino is a 59 y o  male  Plan:      Coronary artery disease involving native coronary artery of native heart without angina pectoris  NO current symptoms  13  1/2 years post CABG X1 (Mammary graft to the diagonal)  Dyslipidemia   Previously intolerant to statins but in retrospect he thinks it may be related to spinal issues  After his spine surgery he is going to try taking Crestor daily and I will bump up the dose at the time of the next visit if he is tolerating it  Pre-operative cardiovascular examination    Okay to proceed with spine surgery at reasonable risk without further cardiac testing  Follow up Plan:  I will see the patient again in the fall and at that point reassess statin dosing and consider follow-up stress testing  It need not be done at present  HPI:   The patient is seen today in consultation from Dr Selene Desir  Regarding preop evaluation  He is to have thoracic spine surgery in the near term  This may be done as a same-day surgery  To reiterate the patient has had prior single vessel CABG with left internal mammary to the diagonal on 07/10/2006  He has had no chest pain or chest pressure since  He smokes cigarettes but is down to 1 pack per week  Results for orders placed or performed in visit on 01/09/20   POCT ECG    Impression    NSR  WNL           Most recent or relevant cardiac/vascular testing:      Cherylene Smaller 05/31/2013: Normal       Past Surgical History:   Procedure Laterality Date    COLONOSCOPY      CORONARY ARTERY BYPASS GRAFT  07/10/2006    LIMA to the diagonal artery    INGUINAL HERNIA REPAIR Right 1996    OH LAP,PROSTATECTOMY,RADICAL,W/NERVE SPARE,INCL ROBOTIC N/A 4/22/2019    Procedure: PROSTATECTOMY RADICAL W/ROBOTICS, B/L PELVIC NODE DISSECT;  Surgeon: Montana Iglesias MD;  Location: [facility];  Service: Urology    SKIN CANCER EXCISION  2016    left side nose    US GUIDED PROSTATE BIOPSY  2/28/2019     CMP:   Lab Results   Component Value Date     04/10/2018    K 3 9 12/06/2019    K 4 7 04/10/2018     12/06/2019     04/10/2018    CO2 30 12/06/2019    CO2 31 04/10/2018    BUN 13 12/06/2019    BUN 18 04/10/2018    CREATININE 0 95 12/06/2019    CREATININE 0 97 04/10/2018    EGFR 84 12/06/2019       Lipid Profile:   Lab Results   Component Value Date    CHOL 110 04/10/2018    TRIG 119 12/06/2019    TRIG 78 04/10/2018    HDL 44 12/06/2019    HDL 43 04/10/2018         Review of Systems   10  point ROS  was otherwise non pertinent or negative except as per HPI or as below  Gait:   Uses a cane  Walking is slow  Objective:     /72   Pulse 68   Ht 5' 8" (1 727 m)   Wt 60 8 kg (134 lb)   BMI 20 37 kg/m²     PHYSICAL EXAM:    General:  Normal appearance in no distress  Eyes:  Anicteric  Oral mucosa:  Moist   Neck:  No JVD  Carotid upstrokes are brisk without bruits  No masses  Chest:  Clear to auscultation and percussion  Well-healed sternotomy scar  Cardiac:  Normal PMI  Normal S1 and S2  No murmur gallop or rub  Abdomen:  Soft and nontender  No palpable organomegaly or aortic enlargement  Extremities:  No peripheral edema  Musculoskeletal:  Symmetric  Vascular:  Femoral pulses are brisk without bruits  Popliteal pulses are intact bilaterally  Pedal pulses are intact  Neuro:  Grossly symmetric  Psych:  Alert and oriented x3          Current Outpatient Medications:     aspirin (ECOTRIN LOW STRENGTH) 81 mg EC tablet, Take 81 mg by mouth daily, Disp: , Rfl:     docusate sodium (COLACE) 100 mg capsule, Take 1 capsule (100 mg total) by mouth 2 (two) times a day (Patient taking differently: Take 100 mg by mouth daily ), Disp: 30 capsule, Rfl: 0    gabapentin (NEURONTIN) 300 mg capsule, Take 300 mg by mouth 4 (four) times a day , Disp: , Rfl:     methocarbamol (ROBAXIN) 500 mg tablet, Take 750 mg by mouth daily at bedtime , Disp: , Rfl:     rosuvastatin (CRESTOR) 10 MG tablet, Take 10 mg by mouth every other day , Disp: , Rfl:     traMADol (ULTRAM) 50 mg tablet, Take 50 mg by mouth 3 (three) times a day as needed, Disp: , Rfl: 0    gabapentin (NEURONTIN) 600 MG tablet, Take 600 mg by mouth daily at bedtime, Disp: , Rfl:     lidocaine (XYLOCAINE) 2 % topical gel, Apply 1 application topically as needed for mild pain (Patient not taking: Reported on 7/26/2019), Disp: 30 mL, Rfl: 2    oxybutynin (DITROPAN) 5 mg tablet, Take 1 tablet (5 mg total) by mouth 4 (four) times a day as needed (bladder spasms) (Patient not taking: Reported on 7/26/2019), Disp: 30 tablet, Rfl: 0    oxyCODONE-acetaminophen (PERCOCET) 5-325 mg per tablet, Take 1 tablet by mouth every 4 (four) hours as needed for moderate pain for up to 20 dosesMax Daily Amount: 6 tablets (Patient not taking: Reported on 7/26/2019), Disp: 20 tablet, Rfl: 0  No Known Allergies  Past Medical History:   Diagnosis Date    BCC (basal cell carcinoma)     in past left side of nose    Chronic pain disorder     low back and down rle    Coronary artery disease     cabg x 1 2006    Hyperlipidemia     Malignant neoplasm prostate (HCC)            Social History     Tobacco Use   Smoking Status Current Every Day Smoker    Packs/day: 0 50    Years: 30 00    Pack years: 15 00    Types: Cigarettes   Smokeless Tobacco Never Used

## 2020-01-09 NOTE — ASSESSMENT & PLAN NOTE
Previously intolerant to statins but in retrospect he thinks it may be related to spinal issues  After his spine surgery he is going to try taking Crestor daily and I will bump up the dose at the time of the next visit if he is tolerating it

## 2020-04-24 ENCOUNTER — APPOINTMENT (OUTPATIENT)
Dept: LAB | Facility: CLINIC | Age: 65
End: 2020-04-24
Payer: COMMERCIAL

## 2020-04-24 DIAGNOSIS — Z90.79 HISTORY OF ROBOT-ASSISTED LAPAROSCOPIC RADICAL PROSTATECTOMY: ICD-10-CM

## 2020-04-24 DIAGNOSIS — C61 PROSTATE CANCER (HCC): ICD-10-CM

## 2020-04-24 LAB — PSA SERPL-MCNC: <0.1 NG/ML (ref 0–4)

## 2020-04-24 PROCEDURE — 84153 ASSAY OF PSA TOTAL: CPT

## 2020-04-30 ENCOUNTER — TELEMEDICINE (OUTPATIENT)
Dept: UROLOGY | Facility: MEDICAL CENTER | Age: 65
End: 2020-04-30
Payer: COMMERCIAL

## 2020-04-30 DIAGNOSIS — C61 PROSTATE CANCER (HCC): Primary | ICD-10-CM

## 2020-04-30 DIAGNOSIS — Z90.79 HISTORY OF ROBOT-ASSISTED LAPAROSCOPIC RADICAL PROSTATECTOMY: ICD-10-CM

## 2020-04-30 PROCEDURE — 99214 OFFICE O/P EST MOD 30 MIN: CPT | Performed by: UROLOGY

## 2020-04-30 RX ORDER — METHOCARBAMOL 750 MG/1
750 TABLET, FILM COATED ORAL 2 TIMES DAILY
COMMUNITY
Start: 2020-04-13 | End: 2020-11-05 | Stop reason: ALTCHOICE

## 2020-04-30 RX ORDER — FAMOTIDINE 20 MG/1
20 TABLET, FILM COATED ORAL 2 TIMES DAILY
COMMUNITY
Start: 2020-01-30 | End: 2020-11-05 | Stop reason: ALTCHOICE

## 2020-04-30 RX ORDER — ACETAMINOPHEN 500 MG
500 TABLET ORAL 3 TIMES DAILY
COMMUNITY
Start: 2020-01-30

## 2020-04-30 RX ORDER — CELECOXIB 100 MG/1
CAPSULE ORAL
COMMUNITY
Start: 2020-01-27 | End: 2020-11-05 | Stop reason: ALTCHOICE

## 2020-04-30 RX ORDER — MELOXICAM 15 MG/1
15 TABLET ORAL DAILY
COMMUNITY
Start: 2020-04-15 | End: 2020-11-05 | Stop reason: ALTCHOICE

## 2020-04-30 RX ORDER — DEXAMETHASONE 2 MG/1
TABLET ORAL
COMMUNITY
Start: 2020-01-30 | End: 2020-11-05 | Stop reason: ALTCHOICE

## 2020-06-15 ENCOUNTER — APPOINTMENT (OUTPATIENT)
Dept: LAB | Facility: HOSPITAL | Age: 65
End: 2020-06-15
Payer: COMMERCIAL

## 2020-06-15 ENCOUNTER — TRANSCRIBE ORDERS (OUTPATIENT)
Dept: LAB | Facility: HOSPITAL | Age: 65
End: 2020-06-15

## 2020-06-15 DIAGNOSIS — E78.5 HYPERLIPIDEMIA, UNSPECIFIED HYPERLIPIDEMIA TYPE: ICD-10-CM

## 2020-06-15 DIAGNOSIS — R53.83 FATIGUE, UNSPECIFIED TYPE: ICD-10-CM

## 2020-06-15 DIAGNOSIS — R53.83 FATIGUE, UNSPECIFIED TYPE: Primary | ICD-10-CM

## 2020-06-15 DIAGNOSIS — I10 HYPERTENSION, UNSPECIFIED TYPE: ICD-10-CM

## 2020-06-15 DIAGNOSIS — C61 PROSTATE CANCER (HCC): ICD-10-CM

## 2020-06-15 LAB
ALBUMIN SERPL BCP-MCNC: 4.4 G/DL (ref 3.5–5.7)
ALP SERPL-CCNC: 69 U/L (ref 55–165)
ALT SERPL W P-5'-P-CCNC: 23 U/L (ref 7–52)
ANION GAP SERPL CALCULATED.3IONS-SCNC: 4 MMOL/L (ref 4–13)
AST SERPL W P-5'-P-CCNC: 20 U/L (ref 13–39)
BASOPHILS # BLD AUTO: 0.1 THOUSANDS/ΜL (ref 0–0.1)
BASOPHILS NFR BLD AUTO: 1 % (ref 0–2)
BILIRUB SERPL-MCNC: 0.6 MG/DL (ref 0.2–1)
BUN SERPL-MCNC: 22 MG/DL (ref 7–25)
CALCIUM SERPL-MCNC: 9.3 MG/DL (ref 8.6–10.5)
CHLORIDE SERPL-SCNC: 106 MMOL/L (ref 98–107)
CHOLEST SERPL-MCNC: 137 MG/DL (ref 0–200)
CO2 SERPL-SCNC: 29 MMOL/L (ref 21–31)
CREAT SERPL-MCNC: 0.95 MG/DL (ref 0.7–1.3)
EOSINOPHIL # BLD AUTO: 0.4 THOUSAND/ΜL (ref 0–0.61)
EOSINOPHIL NFR BLD AUTO: 6 % (ref 0–5)
ERYTHROCYTE [DISTWIDTH] IN BLOOD BY AUTOMATED COUNT: 13.4 % (ref 11.5–14.5)
GFR SERPL CREATININE-BSD FRML MDRD: 84 ML/MIN/1.73SQ M
GLUCOSE P FAST SERPL-MCNC: 97 MG/DL (ref 65–99)
HCT VFR BLD AUTO: 43 % (ref 42–47)
HDLC SERPL-MCNC: 47 MG/DL
HGB BLD-MCNC: 14.6 G/DL (ref 14–18)
LDLC SERPL CALC-MCNC: 72 MG/DL (ref 0–100)
LYMPHOCYTES # BLD AUTO: 1.8 THOUSANDS/ΜL (ref 0.6–4.47)
LYMPHOCYTES NFR BLD AUTO: 26 % (ref 21–51)
MCH RBC QN AUTO: 31.8 PG (ref 26–34)
MCHC RBC AUTO-ENTMCNC: 33.8 G/DL (ref 31–37)
MCV RBC AUTO: 94 FL (ref 81–99)
MONOCYTES # BLD AUTO: 0.8 THOUSAND/ΜL (ref 0.17–1.22)
MONOCYTES NFR BLD AUTO: 11 % (ref 2–12)
NEUTROPHILS # BLD AUTO: 4 THOUSANDS/ΜL (ref 1.4–6.5)
NEUTS SEG NFR BLD AUTO: 56 % (ref 42–75)
NONHDLC SERPL-MCNC: 90 MG/DL
PLATELET # BLD AUTO: 169 THOUSANDS/UL (ref 149–390)
PMV BLD AUTO: 10.3 FL (ref 8.6–11.7)
POTASSIUM SERPL-SCNC: 4.4 MMOL/L (ref 3.5–5.5)
PROT SERPL-MCNC: 6.4 G/DL (ref 6.4–8.9)
PSA SERPL-MCNC: <0.1 NG/ML (ref 0–4)
RBC # BLD AUTO: 4.58 MILLION/UL (ref 4.3–5.9)
SODIUM SERPL-SCNC: 139 MMOL/L (ref 134–143)
TRIGL SERPL-MCNC: 91 MG/DL (ref 44–166)
TSH SERPL DL<=0.05 MIU/L-ACNC: 2.76 UIU/ML (ref 0.45–5.33)
WBC # BLD AUTO: 7.1 THOUSAND/UL (ref 4.8–10.8)

## 2020-06-15 PROCEDURE — 80061 LIPID PANEL: CPT

## 2020-06-15 PROCEDURE — 80053 COMPREHEN METABOLIC PANEL: CPT

## 2020-06-15 PROCEDURE — 36415 COLL VENOUS BLD VENIPUNCTURE: CPT

## 2020-06-15 PROCEDURE — 84443 ASSAY THYROID STIM HORMONE: CPT

## 2020-06-15 PROCEDURE — 85025 COMPLETE CBC W/AUTO DIFF WBC: CPT

## 2020-06-15 PROCEDURE — 84153 ASSAY OF PSA TOTAL: CPT

## 2020-09-29 ENCOUNTER — OFFICE VISIT (OUTPATIENT)
Dept: CARDIOLOGY CLINIC | Facility: CLINIC | Age: 65
End: 2020-09-29
Payer: COMMERCIAL

## 2020-09-29 VITALS
DIASTOLIC BLOOD PRESSURE: 74 MMHG | HEIGHT: 68 IN | HEART RATE: 76 BPM | SYSTOLIC BLOOD PRESSURE: 106 MMHG | WEIGHT: 125 LBS | BODY MASS INDEX: 18.94 KG/M2

## 2020-09-29 DIAGNOSIS — I25.10 CORONARY ARTERY DISEASE INVOLVING NATIVE CORONARY ARTERY OF NATIVE HEART WITHOUT ANGINA PECTORIS: Primary | ICD-10-CM

## 2020-09-29 DIAGNOSIS — E78.5 DYSLIPIDEMIA: ICD-10-CM

## 2020-09-29 PROCEDURE — 99213 OFFICE O/P EST LOW 20 MIN: CPT | Performed by: INTERNAL MEDICINE

## 2020-09-29 NOTE — PROGRESS NOTES
Patient ID: Yin Grimes is a 72 y o  male  Plan:      Coronary artery disease involving native coronary artery of native heart without angina pectoris  Seven years since the last stress test   Fourteen years since heart bypass surgery  He can walk very fast because of back discomfort  At a [facility] will be ordered  Dyslipidemia  Tolerating daily rosuvastatin  Follow up Plan:  If the stress test is not more than mildly ischemic then follow-up visit and EKG in 1 year  HPI:  Patient is seen in follow-up today regarding the above  Since the last visit he has had no chest pain or chest pressure  He had spine surgery which has helped with some numbness in the leg but still has severe back pain  To reiterate the patient has had prior single vessel CABG with left internal mammary to the diagonal on 07/10/2006  He has had no chest pain or chest pressure since              Most recent or relevant cardiac/vascular testing:    [facility] 05/31/2013: Normal         Past Surgical History:   Procedure Laterality Date    COLONOSCOPY      CORONARY ARTERY BYPASS GRAFT  07/10/2006    LIMA to the diagonal artery    INGUINAL HERNIA REPAIR Right 1996    UT LAP,PROSTATECTOMY,RADICAL,W/NERVE SPARE,INCL ROBOTIC N/A 4/22/2019    Procedure: PROSTATECTOMY RADICAL W/ROBOTICS, B/L PELVIC NODE DISSECT;  Surgeon: Damaso Sharma MD;  Location: [facility];  Service: Urology    SKIN CANCER EXCISION  2016    left side nose    US GUIDED PROSTATE BIOPSY  2/28/2019     CMP:   Lab Results   Component Value Date     04/10/2018    K 4 4 06/15/2020    K 4 7 04/10/2018     06/15/2020     04/10/2018    CO2 29 06/15/2020    CO2 31 04/10/2018    BUN 22 06/15/2020    BUN 18 04/10/2018    CREATININE 0 95 06/15/2020    CREATININE 0 97 04/10/2018    EGFR 84 06/15/2020       Lipid Profile:   Lab Results   Component Value Date    CHOL 110 04/10/2018    TRIG 91 06/15/2020    TRIG 78 04/10/2018    HDL 47 06/15/2020    HDL 43 04/10/2018         Review of Systems   10  point ROS  was otherwise non pertinent or negative except as per HPI or as below  Gait:  Slow  Objective:     /74   Pulse 76   Ht 5' 8" (1 727 m)   Wt 56 7 kg (125 lb)   BMI 19 01 kg/m²     PHYSICAL EXAM:     General:  Normal appearance in no distress  Eyes:  Anicteric  Oral mucosa:  Moist   Neck:  No JVD  Carotid upstrokes are brisk without bruits  No masses  Chest:  Clear to auscultation and percussion  Well-healed sternotomy scar  Cardiac:  Normal PMI  Normal S1 and S2  No murmur gallop or rub  Abdomen:  Soft and nontender  No palpable organomegaly or aortic enlargement  Extremities:  No peripheral edema  Musculoskeletal:  Symmetric  Vascular:  Femoral pulses are brisk without bruits  Popliteal pulses are intact bilaterally  Pedal pulses are intact  Neuro:  Grossly symmetric  Psych:  Alert and oriented x3            Current Outpatient Medications:     acetaminophen (TYLENOL) 500 mg tablet, Take 500 mg by mouth Three times a day, Disp: , Rfl:     aspirin (ECOTRIN LOW STRENGTH) 81 mg EC tablet, Take 81 mg by mouth daily, Disp: , Rfl:     docusate sodium (COLACE) 100 mg capsule, Take 1 capsule (100 mg total) by mouth 2 (two) times a day (Patient taking differently: Take 100 mg by mouth daily ), Disp: 30 capsule, Rfl: 0    gabapentin (NEURONTIN) 300 mg capsule, Take 600 mg total twice a day and 300 mg once a day , Disp: , Rfl:     methocarbamol (ROBAXIN) 750 mg tablet, Take 750 mg by mouth 2 (two) times a day , Disp: , Rfl:     rosuvastatin (CRESTOR) 10 MG tablet, Take 10 mg by mouth daily, Disp: , Rfl:     traMADol (ULTRAM) 50 mg tablet, Take 50 mg by mouth 3 (three) times a day as needed, Disp: , Rfl: 0    celecoxib (CeleBREX) 100 mg capsule, , Disp: , Rfl:     dexamethasone (DECADRON) 2 mg tablet, , Disp: , Rfl:     famotidine (PEPCID) 20 mg tablet, Take 20 mg by mouth 2 (two) times a day, Disp: , Rfl:   meloxicam (MOBIC) 15 mg tablet, Take 15 mg by mouth daily, Disp: , Rfl:     methocarbamol (ROBAXIN) 500 mg tablet, Take 750 mg by mouth daily at bedtime , Disp: , Rfl:   Allergies   Allergen Reactions    Atorvastatin      Past Medical History:   Diagnosis Date    BCC (basal cell carcinoma)     in past left side of nose    Chronic pain disorder     low back and down rle    Coronary artery disease     cabg x 1 2006    Hyperlipidemia     Malignant neoplasm prostate (HCC)            Social History     Tobacco Use   Smoking Status Current Every Day Smoker    Packs/day: 0 50    Years: 30 00    Pack years: 15 00    Types: Cigarettes   Smokeless Tobacco Never Used

## 2020-10-26 ENCOUNTER — TRANSCRIBE ORDERS (OUTPATIENT)
Dept: LAB | Facility: HOSPITAL | Age: 65
End: 2020-10-26

## 2020-10-26 ENCOUNTER — LAB (OUTPATIENT)
Dept: LAB | Facility: HOSPITAL | Age: 65
End: 2020-10-26
Payer: COMMERCIAL

## 2020-10-26 DIAGNOSIS — C61 PROSTATE CANCER (HCC): Primary | ICD-10-CM

## 2020-10-26 DIAGNOSIS — C61 PROSTATE CANCER (HCC): ICD-10-CM

## 2020-10-26 LAB — PSA SERPL-MCNC: <0.1 NG/ML (ref 0–4)

## 2020-10-26 PROCEDURE — 84153 ASSAY OF PSA TOTAL: CPT

## 2020-10-27 ENCOUNTER — HOSPITAL ENCOUNTER (OUTPATIENT)
Dept: NUCLEAR MEDICINE | Facility: HOSPITAL | Age: 65
Discharge: HOME | End: 2020-10-27
Attending: INTERNAL MEDICINE
Payer: COMMERCIAL

## 2020-10-27 ENCOUNTER — HOSPITAL ENCOUNTER (OUTPATIENT)
Dept: NON INVASIVE DIAGNOSTICS | Facility: HOSPITAL | Age: 65
Discharge: HOME | End: 2020-10-27
Attending: INTERNAL MEDICINE
Payer: COMMERCIAL

## 2020-10-27 DIAGNOSIS — E78.5 DYSLIPIDEMIA: ICD-10-CM

## 2020-10-27 DIAGNOSIS — I25.10 CORONARY ARTERY DISEASE INVOLVING NATIVE CORONARY ARTERY OF NATIVE HEART WITHOUT ANGINA PECTORIS: ICD-10-CM

## 2020-10-27 PROCEDURE — 93016 CV STRESS TEST SUPVJ ONLY: CPT | Performed by: INTERNAL MEDICINE

## 2020-10-27 PROCEDURE — G1004 CDSM NDSC: HCPCS

## 2020-10-27 PROCEDURE — 93017 CV STRESS TEST TRACING ONLY: CPT

## 2020-10-27 PROCEDURE — 93018 CV STRESS TEST I&R ONLY: CPT | Performed by: INTERNAL MEDICINE

## 2020-10-27 PROCEDURE — 78452 HT MUSCLE IMAGE SPECT MULT: CPT | Performed by: INTERNAL MEDICINE

## 2020-10-27 PROCEDURE — 78452 HT MUSCLE IMAGE SPECT MULT: CPT

## 2020-10-27 PROCEDURE — A9502 TC99M TETROFOSMIN: HCPCS

## 2020-10-27 RX ADMIN — REGADENOSON 0.4 MG: 0.08 INJECTION, SOLUTION INTRAVENOUS at 10:40

## 2020-10-29 LAB
ARRHY DURING EX: NORMAL
CHEST PAIN STATEMENT: NORMAL
MAX DIASTOLIC BP: 64 MMHG
MAX HEART RATE: 125 BPM
MAX PREDICTED HEART RATE: 155 BPM
MAX. SYSTOLIC BP: 114 MMHG
PROTOCOL NAME: NORMAL
REASON FOR TERMINATION: NORMAL
TARGET HR FORMULA: NORMAL
TEST INDICATION: NORMAL
TIME IN EXERCISE PHASE: NORMAL

## 2020-11-05 ENCOUNTER — OFFICE VISIT (OUTPATIENT)
Dept: UROLOGY | Facility: MEDICAL CENTER | Age: 65
End: 2020-11-05
Payer: COMMERCIAL

## 2020-11-05 VITALS
WEIGHT: 124 LBS | DIASTOLIC BLOOD PRESSURE: 72 MMHG | HEIGHT: 66 IN | BODY MASS INDEX: 19.93 KG/M2 | SYSTOLIC BLOOD PRESSURE: 106 MMHG

## 2020-11-05 DIAGNOSIS — Z85.46 HISTORY OF PROSTATE CANCER: Primary | ICD-10-CM

## 2020-11-05 DIAGNOSIS — N39.3 STRESS INCONTINENCE, MALE: ICD-10-CM

## 2020-11-05 PROBLEM — C61 MALIGNANT NEOPLASM OF PROSTATE (HCC): Status: RESOLVED | Noted: 2019-03-26 | Resolved: 2020-11-05

## 2020-11-05 LAB — POST-VOID RESIDUAL VOLUME, ML POC: 14 ML

## 2020-11-05 PROCEDURE — 99214 OFFICE O/P EST MOD 30 MIN: CPT | Performed by: UROLOGY

## 2020-11-05 PROCEDURE — 51798 US URINE CAPACITY MEASURE: CPT | Performed by: UROLOGY

## 2020-12-24 ENCOUNTER — TRANSCRIBE ORDERS (OUTPATIENT)
Dept: LAB | Facility: HOSPITAL | Age: 65
End: 2020-12-24

## 2020-12-24 ENCOUNTER — LAB (OUTPATIENT)
Dept: LAB | Facility: HOSPITAL | Age: 65
End: 2020-12-24
Attending: INTERNAL MEDICINE
Payer: COMMERCIAL

## 2020-12-24 DIAGNOSIS — I10 HYPERTENSION, UNSPECIFIED TYPE: Primary | ICD-10-CM

## 2020-12-24 DIAGNOSIS — I25.10 ASCVD (ARTERIOSCLEROTIC CARDIOVASCULAR DISEASE): ICD-10-CM

## 2020-12-24 DIAGNOSIS — I10 HYPERTENSION, UNSPECIFIED TYPE: ICD-10-CM

## 2020-12-24 DIAGNOSIS — R35.1 NOCTURIA: ICD-10-CM

## 2020-12-24 DIAGNOSIS — N40.1 BENIGN PROSTATIC HYPERPLASIA WITH LOWER URINARY TRACT SYMPTOMS, SYMPTOM DETAILS UNSPECIFIED: ICD-10-CM

## 2020-12-24 DIAGNOSIS — E78.5 HYPERLIPIDEMIA, UNSPECIFIED HYPERLIPIDEMIA TYPE: ICD-10-CM

## 2020-12-24 LAB
ALBUMIN SERPL BCP-MCNC: 4.2 G/DL (ref 3.5–5.7)
ALP SERPL-CCNC: 53 U/L (ref 55–165)
ALT SERPL W P-5'-P-CCNC: 8 U/L (ref 7–52)
ANION GAP SERPL CALCULATED.3IONS-SCNC: 4 MMOL/L (ref 4–13)
AST SERPL W P-5'-P-CCNC: 10 U/L (ref 13–39)
BASOPHILS # BLD AUTO: 0.1 THOUSANDS/ΜL (ref 0–0.1)
BASOPHILS NFR BLD AUTO: 1 % (ref 0–2)
BILIRUB SERPL-MCNC: 0.4 MG/DL (ref 0.2–1)
BUN SERPL-MCNC: 24 MG/DL (ref 7–25)
CALCIUM SERPL-MCNC: 8.9 MG/DL (ref 8.6–10.5)
CHLORIDE SERPL-SCNC: 106 MMOL/L (ref 98–107)
CHOLEST SERPL-MCNC: 118 MG/DL (ref 0–200)
CO2 SERPL-SCNC: 29 MMOL/L (ref 21–31)
CREAT SERPL-MCNC: 1.01 MG/DL (ref 0.7–1.3)
EOSINOPHIL # BLD AUTO: 0.7 THOUSAND/ΜL (ref 0–0.61)
EOSINOPHIL NFR BLD AUTO: 7 % (ref 0–5)
ERYTHROCYTE [DISTWIDTH] IN BLOOD BY AUTOMATED COUNT: 13.9 % (ref 11.5–14.5)
GFR SERPL CREATININE-BSD FRML MDRD: 78 ML/MIN/1.73SQ M
GLUCOSE P FAST SERPL-MCNC: 96 MG/DL (ref 65–99)
HCT VFR BLD AUTO: 43.7 % (ref 42–47)
HDLC SERPL-MCNC: 47 MG/DL
HGB BLD-MCNC: 14.6 G/DL (ref 14–18)
LDLC SERPL CALC-MCNC: 53 MG/DL (ref 0–100)
LYMPHOCYTES # BLD AUTO: 2.5 THOUSANDS/ΜL (ref 0.6–4.47)
LYMPHOCYTES NFR BLD AUTO: 26 % (ref 21–51)
MCH RBC QN AUTO: 31.1 PG (ref 26–34)
MCHC RBC AUTO-ENTMCNC: 33.3 G/DL (ref 31–37)
MCV RBC AUTO: 94 FL (ref 81–99)
MONOCYTES # BLD AUTO: 0.6 THOUSAND/ΜL (ref 0.17–1.22)
MONOCYTES NFR BLD AUTO: 7 % (ref 2–12)
NEUTROPHILS # BLD AUTO: 5.9 THOUSANDS/ΜL (ref 1.4–6.5)
NEUTS SEG NFR BLD AUTO: 60 % (ref 42–75)
NONHDLC SERPL-MCNC: 71 MG/DL
PLATELET # BLD AUTO: 175 THOUSANDS/UL (ref 149–390)
PMV BLD AUTO: 10.2 FL (ref 8.6–11.7)
POTASSIUM SERPL-SCNC: 4.4 MMOL/L (ref 3.5–5.5)
PROT SERPL-MCNC: 6.3 G/DL (ref 6.4–8.9)
PSA SERPL-MCNC: <0.1 NG/ML (ref 0–4)
RBC # BLD AUTO: 4.67 MILLION/UL (ref 4.3–5.9)
SODIUM SERPL-SCNC: 139 MMOL/L (ref 134–143)
TRIGL SERPL-MCNC: 91 MG/DL (ref 44–166)
WBC # BLD AUTO: 9.7 THOUSAND/UL (ref 4.8–10.8)

## 2020-12-24 PROCEDURE — 85025 COMPLETE CBC W/AUTO DIFF WBC: CPT

## 2020-12-24 PROCEDURE — 36415 COLL VENOUS BLD VENIPUNCTURE: CPT

## 2020-12-24 PROCEDURE — G0103 PSA SCREENING: HCPCS

## 2020-12-24 PROCEDURE — 80053 COMPREHEN METABOLIC PANEL: CPT

## 2020-12-24 PROCEDURE — 80061 LIPID PANEL: CPT

## 2021-03-10 DIAGNOSIS — Z23 ENCOUNTER FOR IMMUNIZATION: ICD-10-CM

## 2021-03-16 ENCOUNTER — IMMUNIZATIONS (OUTPATIENT)
Dept: FAMILY MEDICINE CLINIC | Facility: HOSPITAL | Age: 66
End: 2021-03-16

## 2021-03-16 DIAGNOSIS — Z23 ENCOUNTER FOR IMMUNIZATION: Primary | ICD-10-CM

## 2021-03-16 PROCEDURE — 0001A SARS-COV-2 / COVID-19 MRNA VACCINE (PFIZER-BIONTECH) 30 MCG: CPT

## 2021-03-16 PROCEDURE — 91300 SARS-COV-2 / COVID-19 MRNA VACCINE (PFIZER-BIONTECH) 30 MCG: CPT

## 2021-04-07 ENCOUNTER — IMMUNIZATIONS (OUTPATIENT)
Dept: FAMILY MEDICINE CLINIC | Facility: HOSPITAL | Age: 66
End: 2021-04-07

## 2021-04-07 DIAGNOSIS — Z23 ENCOUNTER FOR IMMUNIZATION: Primary | ICD-10-CM

## 2021-04-07 PROCEDURE — 0002A SARS-COV-2 / COVID-19 MRNA VACCINE (PFIZER-BIONTECH) 30 MCG: CPT

## 2021-04-07 PROCEDURE — 91300 SARS-COV-2 / COVID-19 MRNA VACCINE (PFIZER-BIONTECH) 30 MCG: CPT

## 2021-05-28 ENCOUNTER — APPOINTMENT (OUTPATIENT)
Dept: LAB | Facility: HOSPITAL | Age: 66
End: 2021-05-28
Attending: INTERNAL MEDICINE
Payer: COMMERCIAL

## 2021-05-28 ENCOUNTER — TRANSCRIBE ORDERS (OUTPATIENT)
Dept: LAB | Facility: HOSPITAL | Age: 66
End: 2021-05-28

## 2021-05-28 DIAGNOSIS — I25.10 ASCVD (ARTERIOSCLEROTIC CARDIOVASCULAR DISEASE): Primary | ICD-10-CM

## 2021-05-28 DIAGNOSIS — R63.4 WEIGHT LOSS: ICD-10-CM

## 2021-05-28 DIAGNOSIS — I10 HYPERTENSION, UNSPECIFIED TYPE: ICD-10-CM

## 2021-05-28 DIAGNOSIS — I25.10 ASCVD (ARTERIOSCLEROTIC CARDIOVASCULAR DISEASE): ICD-10-CM

## 2021-05-28 DIAGNOSIS — E78.5 HYPERLIPIDEMIA, UNSPECIFIED HYPERLIPIDEMIA TYPE: ICD-10-CM

## 2021-05-28 DIAGNOSIS — Z85.46 HISTORY OF PROSTATE CANCER: ICD-10-CM

## 2021-05-28 LAB
ALBUMIN SERPL BCP-MCNC: 4.5 G/DL (ref 3.5–5.7)
ALP SERPL-CCNC: 58 U/L (ref 55–165)
ALT SERPL W P-5'-P-CCNC: 9 U/L (ref 7–52)
ANION GAP SERPL CALCULATED.3IONS-SCNC: 5 MMOL/L (ref 4–13)
AST SERPL W P-5'-P-CCNC: 14 U/L (ref 13–39)
BASOPHILS # BLD AUTO: 0.1 THOUSANDS/ΜL (ref 0–0.1)
BASOPHILS NFR BLD AUTO: 1 % (ref 0–2)
BILIRUB SERPL-MCNC: 0.6 MG/DL (ref 0.2–1)
BUN SERPL-MCNC: 20 MG/DL (ref 7–25)
CALCIUM SERPL-MCNC: 9.8 MG/DL (ref 8.6–10.5)
CHLORIDE SERPL-SCNC: 103 MMOL/L (ref 98–107)
CHOLEST SERPL-MCNC: 132 MG/DL (ref 0–200)
CO2 SERPL-SCNC: 32 MMOL/L (ref 21–31)
CREAT SERPL-MCNC: 0.92 MG/DL (ref 0.7–1.3)
EOSINOPHIL # BLD AUTO: 0.6 THOUSAND/ΜL (ref 0–0.61)
EOSINOPHIL NFR BLD AUTO: 6 % (ref 0–5)
ERYTHROCYTE [DISTWIDTH] IN BLOOD BY AUTOMATED COUNT: 13.5 % (ref 11.5–14.5)
GFR SERPL CREATININE-BSD FRML MDRD: 87 ML/MIN/1.73SQ M
GLUCOSE P FAST SERPL-MCNC: 98 MG/DL (ref 65–99)
HCT VFR BLD AUTO: 47.2 % (ref 42–47)
HDLC SERPL-MCNC: 53 MG/DL
HGB BLD-MCNC: 15.7 G/DL (ref 14–18)
LDLC SERPL CALC-MCNC: 63 MG/DL (ref 0–100)
LYMPHOCYTES # BLD AUTO: 2.7 THOUSANDS/ΜL (ref 0.6–4.47)
LYMPHOCYTES NFR BLD AUTO: 28 % (ref 21–51)
MCH RBC QN AUTO: 31 PG (ref 26–34)
MCHC RBC AUTO-ENTMCNC: 33.2 G/DL (ref 31–37)
MCV RBC AUTO: 93 FL (ref 81–99)
MONOCYTES # BLD AUTO: 0.9 THOUSAND/ΜL (ref 0.17–1.22)
MONOCYTES NFR BLD AUTO: 9 % (ref 2–12)
NEUTROPHILS # BLD AUTO: 5.6 THOUSANDS/ΜL (ref 1.4–6.5)
NEUTS SEG NFR BLD AUTO: 56 % (ref 42–75)
NONHDLC SERPL-MCNC: 79 MG/DL
PLATELET # BLD AUTO: 192 THOUSANDS/UL (ref 149–390)
PMV BLD AUTO: 10.5 FL (ref 8.6–11.7)
POTASSIUM SERPL-SCNC: 4.2 MMOL/L (ref 3.5–5.5)
PROT SERPL-MCNC: 6.9 G/DL (ref 6.4–8.9)
PSA SERPL-MCNC: <0.1 NG/ML (ref 0–4)
RBC # BLD AUTO: 5.06 MILLION/UL (ref 4.3–5.9)
SODIUM SERPL-SCNC: 140 MMOL/L (ref 134–143)
TRIGL SERPL-MCNC: 81 MG/DL (ref 44–166)
TSH SERPL DL<=0.05 MIU/L-ACNC: 2.71 UIU/ML (ref 0.45–5.33)
WBC # BLD AUTO: 9.9 THOUSAND/UL (ref 4.8–10.8)

## 2021-05-28 PROCEDURE — 36415 COLL VENOUS BLD VENIPUNCTURE: CPT

## 2021-05-28 PROCEDURE — 80053 COMPREHEN METABOLIC PANEL: CPT

## 2021-05-28 PROCEDURE — 85025 COMPLETE CBC W/AUTO DIFF WBC: CPT

## 2021-05-28 PROCEDURE — 84153 ASSAY OF PSA TOTAL: CPT

## 2021-05-28 PROCEDURE — 80061 LIPID PANEL: CPT

## 2021-05-28 PROCEDURE — 84443 ASSAY THYROID STIM HORMONE: CPT

## 2021-06-03 ENCOUNTER — TELEPHONE (OUTPATIENT)
Dept: UROLOGY | Facility: MEDICAL CENTER | Age: 66
End: 2021-06-03

## 2021-06-03 NOTE — TELEPHONE ENCOUNTER
----- Message from Aysha Moffett MD sent at 6/2/2021  2:12 PM EDT -----  Tell pt:  Good, PSA still at the 0 level

## 2021-06-24 ENCOUNTER — EVALUATION (OUTPATIENT)
Dept: PHYSICAL THERAPY | Facility: CLINIC | Age: 66
End: 2021-06-24
Payer: COMMERCIAL

## 2021-06-24 DIAGNOSIS — M54.16 LUMBAR RADICULOPATHY: Primary | ICD-10-CM

## 2021-06-24 PROCEDURE — 97161 PT EVAL LOW COMPLEX 20 MIN: CPT | Performed by: PHYSICAL MEDICINE & REHABILITATION

## 2021-06-24 PROCEDURE — 97110 THERAPEUTIC EXERCISES: CPT | Performed by: PHYSICAL MEDICINE & REHABILITATION

## 2021-06-24 NOTE — LETTER
2021    Perfecto Brito, [address] [address]    Patient: Delroy Ramey   YOB: 1955   Date of Visit: 2021     Encounter Diagnosis     ICD-10-CM    1  Lumbar radiculopathy  M54 16        Dear Dr José Dominguez: Thank you for your recent referral of Delroy Ramey  Please review the attached evaluation summary from Arslan's recent visit  Please verify that you agree with the plan of care by signing the attached order  If you have any questions or concerns, please do not hesitate to call  I sincerely appreciate the opportunity to share in the care of one of your patients and hope to have another opportunity to work with you in the near future  Sincerely,    Freida Deutsch, PT      Referring Provider:      I certify that I have read the below Plan of Care and certify the need for these services furnished under this plan of treatment while under my care  Perfecto Brito MD  [address] [place] [address]  Via Fax: [phone]          PT Evaluation     Today's date: 2021  Patient name: Delroy Ramey  : 1955  MRN: [id number]  Referring provider: Virgilio Burton MD  Dx:   Encounter Diagnosis     ICD-10-CM    1  Lumbar radiculopathy  M54 16                   Assessment  Assessment details: Delroy Ramey is a 72 y o  male presenting to outpatient physical therapy with diagnosis of Lumbar radiculopathy  (primary encounter diagnosis)  He has chronic hx of LBP and R> L LE radiculopathy  PMHx significant as well for CAD and CA  Patient's current impairments include back and LE pain, impaired soft tissue mobility B LEs, reduced range of motion L/S and B hips with pain, reduced strength of the proximal hips and core, reduced postural awareness, and reduced activity tolerance   Patient's present functional limitations include difficulty with ADLs with increased need for assistance, reliance on medication and/or modalities for pain relief, poor tolerance for functional mobility and activity, and difficulty completing work and home responsibilities  Patient to benefit from skilled outpatient physical therapy 2x/week for 4 weeks in order to reduce pain, maximize pain free range of motion, increase strength and stability, and improve functional mobility/functional activity in order to maximize return to prior level of function with reduced limitations  Thank you for your referral     Impairments: abnormal muscle firing, abnormal or restricted ROM, activity intolerance, impaired physical strength, lacks appropriate home exercise program, pain with function and poor posture     Symptom irritability: moderateUnderstanding of Dx/Px/POC: good   Prognosis: fair  Prognosis details: Chronic pain/sx , multiple failed prior treatments     Goals  STGs to be achieved in 4-6 weeks:    1  Pt will report reduced overall pain levels "at worst" by at least 2 points (0-10 scale) in order to allow improved tolerance for functional mobility and reduced reliance on medication or modalities for pain relief  2  Pt will demonstrate improved AROM of L/S flexion by at least 5-10* in order to reduce pt difficulty with functional mobility and transfers  3  Pt will demonstrate improved proximal hip and core strength by at least 1/2-1 MMT in order to improve lumbo-pelvic stability to reduce pain and improve function  4  Pt will report overall improved tolerance for sustained mobility/activity by at least 25-50% since [facility] with overall reduced pain levels and reduced fear avoidance  LTGs to be achieved in 8-12 weeks:    1  Pt will be independent with HEP, demonstrating proper technique with exercises and understanding of self progression of program without need for cueing or assistance  2  Pt will report minimized pain levels with at least 25% reduction in pain since [facility]  3  Pt will demonstrate WFL AROM and strength of B Hips grossly     4  FOTO will reflect score at discharge that is greater than or equal to predicted level  Plan  Patient would benefit from: skilled physical therapy  Planned modality interventions: cryotherapy and thermotherapy: hydrocollator packs  Planned therapy interventions: abdominal trunk stabilization, manual therapy, motor coordination training, neuromuscular re-education, patient education, self care, strengthening, stretching, therapeutic activities, therapeutic exercise and home exercise program  Frequency: 2x week  Duration in visits: 8  Duration in weeks: 4  Plan of Care beginning date: 6/24/2021  Plan of Care expiration date: 7/24/2021  Treatment plan discussed with: patient        Subjective Evaluation    History of Present Illness  Mechanism of injury: Chronic hx of LBP for at least 15 years; has progressively worsened over the years  Has been f/u with Dr Pb Morgan with pain management; has been having injection and medications which have provided benefit, but only for weeks at a time  In the past has gone to the chiropractor which did seem to help for some time; no recent tx  Last MRI done in 2019; see report for details  No recent imaging of the back  Notes he last f/u with Dr Pb Morgan a few weeks ago; referred to OPPT at this time  Had an injection about 2 weeks ago with about one week of relief  RTD 7/14  Works in an office for several hours a week; back pain is worse with prolonged sitting at work/at his desk; tries to get up frequently  Pt notes present pain is constant and doesn't go away  Pain is worse with sitting, laying, driving/riding in a car, bending, lifting, sleeping,  vibrations/jolting from riding in a car, and with inactivity and first getting up  Pain is generally better with activity and working around his shop  Pain is reduced with laying flat for some time (worse if too long), with gabapentin and tramadol, and with icy hot/lidocaine   Pain is 1* located central/R L/S and can radiate into R buttock to R thigh and front of R knee into the shin and R foot  Spasms/cramping in R LE at night  Can sometimes also have pain in LLE but not as bad  Can also have B anterior thigh /knee pain that is more constant  Notes chronic constant n/t in B thighs to his knees  No new n/t or sensory changes  Chronic LE weakness; not worsening per pt, actually a little better  No bowel/bladder changes and no saddle anaesthesia  No recent unexplained weight loss  Some night pain  Overall notes his pain/sx have not changed  Recurrent probem    Quality of life: fair    Pain  Current pain ratin  At best pain ratin  At worst pain ratin  Location: R L/S, R LE as noted above , L thigh   Quality: sharp, dull ache and burning  Progression: no change    Social Support  Steps to enter house: yes  Stairs in house: no   Lives in: [place]  Lives with: spouse    Employment status: working (Paperwork)  Hand dominance: right    Treatments  Previous treatment: medication, injection treatment and chiropractic  Current treatment: injection treatment, medication and physical therapy  Patient Goals  Patient goals for therapy: decreased pain, increased motion and increased strength          Objective     Concurrent Complaints  Positive for night pain, disturbed sleep and history of cancer  Negative for bladder dysfunction, bowel dysfunction, saddle (S4) numbness, history of trauma and infection    Postural Observations  Seated posture: poor  Standing posture: fair  Correction of posture: has no consistent effect    Additional Postural Observation Details  Seated: Forward head/rounded shoulders  Increased thoracic kyphosis   B scapular depression and protraction with mild winging   Increased PPT in sitting    Standing:  Increased VALERI  Reduced lumbar lordosis         Tenderness     Lumbar Spine  No tenderness in the spinous process  Left Hip   No tenderness in the PSIS  Right Hip   No tenderness in the PSIS  Neurological Testing     Sensation     Lumbar   Left   Intact: light touch  Diminished: light touch    Right   Intact: light touch  Diminished: light touch    Reflexes   Left   Patellar (L4): normal (2+)  Achilles (S1): normal (2+)  Clonus sign: negative    Right   Patellar (L4): normal (2+)  Achilles (S1): normal (2+)  Clonus sign: negative    Additional Neurological Details  Notes diminished sensation B thighs to knees anteriorly   Diminished sensation reported R vs L LE into R lower leg and R foot anterior/laterally   Normal sensation reported posterior thighs/calves B   Notes this is chronic and unchanging   Will cont to monitor     Active Range of Motion     Lumbar   Flexion: 30 ("tight") degrees   Extension: 20 (pain B anterior thighs, pain in back of thighs/glutes at end range) degrees  with pain  Left lateral flexion: Active left lumbar lateral flexion: pain R L/S at end range  with pain Restriction level: moderate  Right lateral flexion: Active right lumbar lateral flexion: pain in L thigh at end range  with pain Restriction level: moderate  Left rotation: Active left lumbar rotation: "stiff" only  Restriction level: minimal  Right rotation: Active right lumbar rotation: no complaints    [facility]    Additional Active Range of Motion Details  No effect on LE sx with AROM L/S in standing  No effect on sx following AROM assessment  Limited L/S flexion mobility with L/S forward flexion   Will cont to assess           Strength/Myotome Testing     Left Hip   Planes of Motion   Flexion: 4-  Abduction: 4  Adduction: 4    Right Hip   Planes of Motion   Flexion: 4- (R L/S pain)  Abduction: 4  Adduction: 4    Left Knee   Flexion: 4  Extension: 4    Right Knee   Flexion: 4+ (Pain R L/S )  Extension: 4+ (Pain R L/S)    Left Ankle/Foot   Dorsiflexion: 4+  Plantar flexion: 5    Right Ankle/Foot   Dorsiflexion: 4+  Plantar flexion: 5    Additional Strength Details  Discomfort R L/S with MMT screening seated EOT   No distal sx   General proximal weakness noted B   Will cont to assess    Muscle Activation   Patient able to activate left transverse abdominals and right transverse abdominals  Additional Muscle Activation Details  Diminished activation of TA with transfers/mobility     Tests     Lumbar     Left   Negative crossed SLR and passive SLR  Right   Negative crossed SLR and passive SLR  Left Hip   Negative REGAN and FADIR  Right Hip   Positive REGAN  Negative FADIR       Additional Tests Details  R/L SLR to <45* B with c/o HS pain/tightness  Hip flexor tightness suggested by posture; will cont to assess   + R REGAN for local R L/S /posterior thigh discomfort at end range    Moderate hypomobility B hips   No effect on pain with R /L hip PROM assessment   Will cont to assess    Ambulation     Quality of Movement During Gait     Additional Quality of Movement During Gait Details  Slow hema  Flexed forward  Shuffling gait  Reduced trunk/arm swing              Precautions: CAD, FALLS, CA hx       Re-eval Date: 7/24    Date 6/24       Visit Count 1       FOTO 6/24       Pain In See IE        Pain Out See IE              Manuals 6/24       Trial gentle CPA mobs L/S grades 1-3 as tolerated                                 Neuro Re-Ed        TA supine      TA with bent knee fallouts         TA with heel slides         Dead bugs       Pallof press         Lifts/chops with tband                                 Ther Ex        Nustep    LTR     Reviewed  1x ea side L/R    Standing repeated L S ext to tolerance AROM 10x   Reviewed        DKTC      Piriformis stretch Reviewed 2x 10-15"    1x30" ea  Reviewed        Hip flexor stretch         SLRs      Bridges        clamshells        Leg press      Step ups         Knee extension    HS curls                Ther Activity                        Gait Training                        Modalities prn                             6/24 - HEP was issued and reviewed this date for above noted exercises  Also spent increased time this date with pt education regarding activity awareness and modifications (avoid repetitive bending/lifting, avoid prolonged sitting, correct seated posture, frequently getting up/moving t/o the day-etc all to pt tolerance)  Pt demonstrated understanding without incident and without questions/concerns  Will continue to update upcoming

## 2021-06-24 NOTE — PROGRESS NOTES
PT Evaluation     Today's date: 2021  Patient name: Caesar Ambrose  : 1955  MRN: [id number]  Referring provider: Aj Daly MD  Dx:   Encounter Diagnosis     ICD-10-CM    1  Lumbar radiculopathy  M54 16                   Assessment  Assessment details: Caesar Ambrose is a 72 y o  male presenting to outpatient physical therapy with diagnosis of Lumbar radiculopathy  (primary encounter diagnosis)  He has chronic hx of LBP and R> L LE radiculopathy  PMHx significant as well for CAD and CA  Patient's current impairments include back and LE pain, impaired soft tissue mobility B LEs, reduced range of motion L/S and B hips with pain, reduced strength of the proximal hips and core, reduced postural awareness, and reduced activity tolerance  Patient's present functional limitations include difficulty with ADLs with increased need for assistance, reliance on medication and/or modalities for pain relief, poor tolerance for functional mobility and activity, and difficulty completing work and home responsibilities  Patient to benefit from skilled outpatient physical therapy 2x/week for 4 weeks in order to reduce pain, maximize pain free range of motion, increase strength and stability, and improve functional mobility/functional activity in order to maximize return to prior level of function with reduced limitations  Thank you for your referral     Impairments: abnormal muscle firing, abnormal or restricted ROM, activity intolerance, impaired physical strength, lacks appropriate home exercise program, pain with function and poor posture     Symptom irritability: moderateUnderstanding of Dx/Px/POC: good   Prognosis: fair  Prognosis details: Chronic pain/sx , multiple failed prior treatments     Goals  STGs to be achieved in 4-6 weeks:    1   Pt will report reduced overall pain levels "at worst" by at least 2 points (0-10 scale) in order to allow improved tolerance for functional mobility and reduced reliance on medication or modalities for pain relief  2  Pt will demonstrate improved AROM of L/S flexion by at least 5-10* in order to reduce pt difficulty with functional mobility and transfers  3  Pt will demonstrate improved proximal hip and core strength by at least 1/2-1 MMT in order to improve lumbo-pelvic stability to reduce pain and improve function  4  Pt will report overall improved tolerance for sustained mobility/activity by at least 25-50% since [facility] with overall reduced pain levels and reduced fear avoidance  LTGs to be achieved in 8-12 weeks:    1  Pt will be independent with HEP, demonstrating proper technique with exercises and understanding of self progression of program without need for cueing or assistance  2  Pt will report minimized pain levels with at least 25% reduction in pain since [facility]  3  Pt will demonstrate WFL AROM and strength of B Hips grossly  4  FOTO will reflect score at discharge that is greater than or equal to predicted level  Plan  Patient would benefit from: skilled physical therapy  Planned modality interventions: cryotherapy and thermotherapy: hydrocollator packs  Planned therapy interventions: abdominal trunk stabilization, manual therapy, motor coordination training, neuromuscular re-education, patient education, self care, strengthening, stretching, therapeutic activities, therapeutic exercise and home exercise program  Frequency: 2x week  Duration in visits: 8  Duration in weeks: 4  Plan of Care beginning date: 6/24/2021  Plan of Care expiration date: 7/24/2021  Treatment plan discussed with: patient        Subjective Evaluation    History of Present Illness  Mechanism of injury: Chronic hx of LBP for at least 15 years; has progressively worsened over the years  Has been f/u with Dr Joey Shearer with pain management; has been having injection and medications which have provided benefit, but only for weeks at a time   In the past has gone to the chiropractor which did seem to help for some time; no recent tx  Last MRI done in 2019; see report for details  No recent imaging of the back  Notes he last f/u with Dr Alverto Torres a few weeks ago; referred to OPPT at this time  Had an injection about 2 weeks ago with about one week of relief  RTD   Works in an office for several hours a week; back pain is worse with prolonged sitting at work/at his desk; tries to get up frequently  Pt notes present pain is constant and doesn't go away  Pain is worse with sitting, laying, driving/riding in a car, bending, lifting, sleeping,  vibrations/jolting from riding in a car, and with inactivity and first getting up  Pain is generally better with activity and working around his shop  Pain is reduced with laying flat for some time (worse if too long), with gabapentin and tramadol, and with icy hot/lidocaine  Pain is 1* located central/R L/S and can radiate into R buttock to R thigh and front of R knee into the shin and R foot  Spasms/cramping in R LE at night  Can sometimes also have pain in LLE but not as bad  Can also have B anterior thigh /knee pain that is more constant  Notes chronic constant n/t in B thighs to his knees  No new n/t or sensory changes  Chronic LE weakness; not worsening per pt, actually a little better  No bowel/bladder changes and no saddle anaesthesia  No recent unexplained weight loss  Some night pain  Overall notes his pain/sx have not changed             Recurrent probem    Quality of life: fair    Pain  Current pain ratin  At best pain ratin  At worst pain ratin  Location: R L/S, R LE as noted above , L thigh   Quality: sharp, dull ache and burning  Progression: no change    Social Support  Steps to enter house: yes  Stairs in house: no   Lives in: [place]  Lives with: spouse    Employment status: working (Paperwork)  Hand dominance: right    Treatments  Previous treatment: medication, injection treatment and chiropractic  Current treatment: injection treatment, medication and physical therapy  Patient Goals  Patient goals for therapy: decreased pain, increased motion and increased strength          Objective     Concurrent Complaints  Positive for night pain, disturbed sleep and history of cancer  Negative for bladder dysfunction, bowel dysfunction, saddle (S4) numbness, history of trauma and infection    Postural Observations  Seated posture: poor  Standing posture: fair  Correction of posture: has no consistent effect    Additional Postural Observation Details  Seated: Forward head/rounded shoulders  Increased thoracic kyphosis   B scapular depression and protraction with mild winging   Increased PPT in sitting    Standing:  Increased VALERI  Reduced lumbar lordosis         Tenderness     Lumbar Spine  No tenderness in the spinous process  Left Hip   No tenderness in the PSIS  Right Hip   No tenderness in the PSIS  Neurological Testing     Sensation     Lumbar   Left   Intact: light touch  Diminished: light touch    Right   Intact: light touch  Diminished: light touch    Reflexes   Left   Patellar (L4): normal (2+)  Achilles (S1): normal (2+)  Clonus sign: negative    Right   Patellar (L4): normal (2+)  Achilles (S1): normal (2+)  Clonus sign: negative    Additional Neurological Details  Notes diminished sensation B thighs to knees anteriorly   Diminished sensation reported R vs L LE into R lower leg and R foot anterior/laterally   Normal sensation reported posterior thighs/calves B   Notes this is chronic and unchanging   Will cont to monitor     Active Range of Motion     Lumbar   Flexion: 30 ("tight") degrees   Extension: 20 (pain B anterior thighs, pain in back of thighs/glutes at end range) degrees  with pain  Left lateral flexion: Active left lumbar lateral flexion: pain R L/S at end range  with pain Restriction level: moderate  Right lateral flexion: Active right lumbar lateral flexion: pain in L thigh at end range    with pain Restriction level: moderate  Left rotation: Active left lumbar rotation: "stiff" only  Restriction level: minimal  Right rotation: Active right lumbar rotation: no complaints  [facility] [address]    Additional Active Range of Motion Details  No effect on LE sx with AROM L/S in standing  No effect on sx following AROM assessment  Limited L/S flexion mobility with L/S forward flexion   Will cont to assess           Strength/Myotome Testing     Left Hip   Planes of Motion   Flexion: 4-  Abduction: 4  Adduction: 4    Right Hip   Planes of Motion   Flexion: 4- (R L/S pain)  Abduction: 4  Adduction: 4    Left Knee   Flexion: 4  Extension: 4    Right Knee   Flexion: 4+ (Pain R L/S )  Extension: 4+ (Pain R L/S)    Left Ankle/Foot   Dorsiflexion: 4+  Plantar flexion: 5    Right Ankle/Foot   Dorsiflexion: 4+  Plantar flexion: 5    Additional Strength Details  Discomfort R L/S with MMT screening seated EOT   No distal sx   General proximal weakness noted B   Will cont to assess    Muscle Activation   Patient able to activate left transverse abdominals and right transverse abdominals  Additional Muscle Activation Details  Diminished activation of TA with transfers/mobility     Tests     Lumbar     Left   Negative crossed SLR and passive SLR  Right   Negative crossed SLR and passive SLR  Left Hip   Negative REGAN and FADIR  Right Hip   Positive REGAN  Negative FADIR       Additional Tests Details  R/L SLR to <45* B with c/o HS pain/tightness  Hip flexor tightness suggested by posture; will cont to assess   + R REGAN for local R L/S /posterior thigh discomfort at end range    Moderate hypomobility B hips   No effect on pain with R /L hip PROM assessment   Will cont to assess    Ambulation     Quality of Movement During Gait     Additional Quality of Movement During Gait Details  Slow hema  Flexed forward  Shuffling gait  Reduced trunk/arm swing              Precautions: CAD, FALLS, CA hx       Re-eval Date: 7/24    Date 6/24       Visit Count 1       FOTO 6/24       Pain In See IE        Pain Out See IE              Manuals 6/24       Trial gentle CPA mobs L/S grades 1-3 as tolerated                                 Neuro Re-Ed        TA supine      TA with bent knee fallouts         TA with heel slides         Dead bugs       Pallof press         Lifts/chops with tband                                 Ther Ex        Nustep    LTR     Reviewed  1x ea side L/R    Standing repeated L S ext to tolerance AROM 10x   Reviewed        DKTC      Piriformis stretch Reviewed 2x 10-15"    1x30" ea  Reviewed        Hip flexor stretch         SLRs      Bridges        clamshells        Leg press      Step ups         Knee extension    HS curls                Ther Activity                        Gait Training                        Modalities prn                             6/24 - HEP was issued and reviewed this date for above noted exercises  Also spent increased time this date with pt education regarding activity awareness and modifications (avoid repetitive bending/lifting, avoid prolonged sitting, correct seated posture, frequently getting up/moving t/o the day-etc all to pt tolerance)  Pt demonstrated understanding without incident and without questions/concerns  Will continue to update upcoming

## 2021-06-30 ENCOUNTER — OFFICE VISIT (OUTPATIENT)
Dept: PHYSICAL THERAPY | Facility: CLINIC | Age: 66
End: 2021-06-30
Payer: COMMERCIAL

## 2021-06-30 DIAGNOSIS — M54.16 LUMBAR RADICULOPATHY: Primary | ICD-10-CM

## 2021-06-30 PROCEDURE — 97140 MANUAL THERAPY 1/> REGIONS: CPT

## 2021-06-30 PROCEDURE — 97110 THERAPEUTIC EXERCISES: CPT

## 2021-06-30 PROCEDURE — 97112 NEUROMUSCULAR REEDUCATION: CPT

## 2021-06-30 NOTE — PROGRESS NOTES
Daily Note     Today's date: 2021  Patient name: Juventino Cuenca  : 1955  MRN: [id number]  Referring provider: Linnette Mackay MD  Dx:   Encounter Diagnosis     ICD-10-CM    1  Lumbar radiculopathy  M54 16        Start Time: 830  Stop Time: 915  Total time in clinic (min): 45 minutes    Subjective: "I have pain across my back, down the front of both legs to my knees and down the back of my R leg to my foot "      Objective: See treatment diary below      Assessment: Tolerated treatment well  Initiated exercise program this date  Pt noted irritation in LB during SLRs  Tenderness over LS during soft tissue movements  Decreased "burning" noted down front thighs and posterior R LE  Decreased overall pain at end of session  Will continue to monitor and progress as able  Patient demonstrated fatigue post treatment and would benefit from continued PT      Plan: Continue per plan of care  Progress treatment as tolerated         Precautions: CAD, FALLS, CA hx       Re-eval Date:     Date       Visit Count 1 2/8      FOTO        Pain In See IE  510      Pain Out See IE              Manuals       Trial gentle CPA mobs L/S grades 1-3 as tolerated   Trial gentle  L/S soft tissue mvt  tolerated 10'                              Neuro Re-Ed        TA supine      TA with bent knee fallouts   20x/3-5"      20x/3-5"      TA with heel slides         Dead bugs       Pallof press         grn 10x10" ea      Lifts/chops with tband                                 Ther Ex        Nustep    LTR     Reviewed  1x ea side L/R    Standing repeated L S ext to tolerance AROM 10x   Reviewed  L3 10'      10x10"      Standing repeated L S ext to tolerance AROM 10x3-5"      DKTC      Piriformis stretch Reviewed 2x 10-15"    1x30" ea  Reviewed  10x10"      4x30"      Hip flexor stretch         SLRs      Bridges  Flex 2x10/3-5"      clamshells        Leg press      Step ups         Knee extension    HS curls Ther Activity                        Gait Training                        Modalities prn

## 2021-07-01 ENCOUNTER — OFFICE VISIT (OUTPATIENT)
Dept: PHYSICAL THERAPY | Facility: CLINIC | Age: 66
End: 2021-07-01
Payer: COMMERCIAL

## 2021-07-01 DIAGNOSIS — M54.16 LUMBAR RADICULOPATHY: Primary | ICD-10-CM

## 2021-07-01 PROCEDURE — 97140 MANUAL THERAPY 1/> REGIONS: CPT

## 2021-07-01 PROCEDURE — 97110 THERAPEUTIC EXERCISES: CPT

## 2021-07-01 PROCEDURE — 97112 NEUROMUSCULAR REEDUCATION: CPT

## 2021-07-01 NOTE — PROGRESS NOTES
Daily Note     Today's date: 2021  Patient name: Shabnam Fisher  : 1955  MRN: [id number]  Referring provider: Arjun Guevara MD  Dx:   Encounter Diagnosis     ICD-10-CM    1  Lumbar radiculopathy  M54 16        Start Time:   Stop Time: 560  Total time in clinic (min): 60 minutes    Subjective: "The rainy days don't help my back "      Objective: See treatment diary below      Assessment: Tolerated treatment well  Pt able to complete all exercises despite higher pain levels  Decreased strength noted in hip abductors; quick muscle fatigue noted with challenge to complete  Tenderness over paraspinals noted; slight relief after MT  Increased soreness, decreased pain overall noted at end of session  Will continue to progress as able  Patient demonstrated fatigue post treatment and would benefit from continued PT      Plan: Continue per plan of care  Progress treatment as tolerated         Precautions: CAD, FALLS, CA hx       Re-eval Date:     Date      Visit Count 1 2/8 3/8     FOTO        Pain In See IE  5/10 6/10     Pain Out See IE   5/10           Manuals      Trial gentle CPA mobs L/S grades 1-3 as tolerated   Trial gentle  L/S soft tissue mvt  tolerated 10' Trial gentle  L/S soft tissue mvt  tolerated 10'                             Neuro Re-Ed        TA supine      TA with bent knee fallouts   20x/3-5"      20x/3-5" 20x/3-5"      20x/3-5"     TA with heel slides         Dead bugs       Pallof press         grn 10x10" ea       grn 10x10" ea     Lifts/chops with tband                                 Ther Ex        Nustep    LTR     Reviewed  1x ea side L/R    Standing repeated L S ext to tolerance AROM 10x   Reviewed  L3 10'      10x10"      Standing repeated L S ext to tolerance AROM 10x3-5" L3 10'      10x10"      Standing repeated L S ext to tolerance AROM 2x10/3-5"     DKTC      Piriformis stretch Reviewed 2x 10-15"    1x30" ea  Reviewed  10x10"      4x30" 10x10"      4x30"     Hip flexor stretch         SLRs      Bridges  Flex 2x10/3-5" Flex/abd 2x10/3-5"     clamshells   2x10     Leg press      Step ups         Knee extension    HS curls                Ther Activity                        Gait Training                        Modalities prn

## 2021-07-07 ENCOUNTER — OFFICE VISIT (OUTPATIENT)
Dept: PHYSICAL THERAPY | Facility: CLINIC | Age: 66
End: 2021-07-07
Payer: COMMERCIAL

## 2021-07-07 ENCOUNTER — HOSPITAL ENCOUNTER (OUTPATIENT)
Dept: CT IMAGING | Facility: HOSPITAL | Age: 66
Discharge: HOME | End: 2021-07-07
Attending: INTERNAL MEDICINE
Payer: COMMERCIAL

## 2021-07-07 DIAGNOSIS — F17.210 CIGARETTE SMOKER: ICD-10-CM

## 2021-07-07 DIAGNOSIS — M54.16 LUMBAR RADICULOPATHY: Primary | ICD-10-CM

## 2021-07-07 PROCEDURE — 97112 NEUROMUSCULAR REEDUCATION: CPT

## 2021-07-07 PROCEDURE — 71271 CT THORAX LUNG CANCER SCR C-: CPT

## 2021-07-07 PROCEDURE — 97110 THERAPEUTIC EXERCISES: CPT

## 2021-07-07 NOTE — PROGRESS NOTES
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; pt noted increased numbness B thighs after, performed standing ext and symptoms resolved  Radiating symptoms noted posterior/lateral L LE, resolved at end of session  Held SL clamshells and hip abd; will resume if no change in symptoms  Will continue to monitor and progress as able  Patient demonstrated fatigue post treatment and would benefit from continued PT      Plan: Continue per plan of care  Progress treatment as tolerated         Precautions: CAD, FALLS, CA hx       Re-eval Date:     Date     Visit Count 1 8 38     FOTO        Pain In See IE  5/10 6/10 7/10    Pain Out See IE   5/10           Manuals     Trial gentle CPA mobs L/S grades 1-3 as tolerated   Trial gentle  L/S soft tissue mvt  tolerated 10' Trial gentle  L/S soft tissue mvt  tolerated 10' Trial gentle  L/S soft tissue mvt  tolerated 10'                            Neuro Re-Ed        TA supine      TA with bent knee fallouts   20x/3-5"      20x/3-5" 20x/3-5"      20x/3-5"       20x/3-5"    TA with heel slides         Dead bugs       Pallof press         grn 10x10" ea       grn 10x10" ea       grn 10x10" ea      Lifts/chops with tband                                 Ther Ex        Nustep    LTR     Reviewed  1x ea side L/R    Standing repeated L S ext to tolerance AROM 10x   Reviewed  L3 10'      10x10"      Standing repeated L S ext to tolerance AROM 10x3-5" L3 10'      10x10"      Standing repeated L S ext to tolerance AROM 2x10/3-5" L3 10' no UE      10x10"      Standing repeated L S ext to tolerance AROM 2x10/3-5"    DKTC      Piriformis stretch Reviewed 2x 10-15"    1x30" ea  Reviewed  10x10"      4x30" 10x10"      4x30" 10x10"      4x30"      Hip flexor stretch         SLRs      Bridges  Flex 2x10/3-5" Flex/abd 2x10/3-5" Flex  2x10/3-5"    clamshells   2x10 resume    Leg press      Step ups         Knee extension    HS curls                Ther Activity                        Gait Training                        Modalities prn

## 2021-07-09 ENCOUNTER — OFFICE VISIT (OUTPATIENT)
Dept: PHYSICAL THERAPY | Facility: CLINIC | Age: 66
End: 2021-07-09
Payer: COMMERCIAL

## 2021-07-09 DIAGNOSIS — M54.16 LUMBAR RADICULOPATHY: Primary | ICD-10-CM

## 2021-07-09 PROCEDURE — 97110 THERAPEUTIC EXERCISES: CPT

## 2021-07-09 PROCEDURE — 97140 MANUAL THERAPY 1/> REGIONS: CPT

## 2021-07-13 ENCOUNTER — APPOINTMENT (OUTPATIENT)
Dept: PHYSICAL THERAPY | Facility: CLINIC | Age: 66
End: 2021-07-13
Payer: COMMERCIAL

## 2021-07-14 ENCOUNTER — OFFICE VISIT (OUTPATIENT)
Dept: PHYSICAL THERAPY | Facility: CLINIC | Age: 66
End: 2021-07-14
Payer: COMMERCIAL

## 2021-07-14 DIAGNOSIS — M54.16 LUMBAR RADICULOPATHY: Primary | ICD-10-CM

## 2021-07-14 PROCEDURE — 97110 THERAPEUTIC EXERCISES: CPT

## 2021-07-14 PROCEDURE — 97140 MANUAL THERAPY 1/> REGIONS: CPT

## 2021-07-14 PROCEDURE — 97112 NEUROMUSCULAR REEDUCATION: CPT

## 2021-07-14 NOTE — PROGRESS NOTES
Daily Note     Today's date: 2021  Patient name: Pete Fraga  : 1955  MRN: [id number]  Referring provider: Stan Bridges MD  Dx:   Encounter Diagnosis     ICD-10-CM    1  Lumbar radiculopathy  M54 16        Start Time: 853  Stop Time: 153  Total time in clinic (min): 60 minutes    Subjective: "My legs and back are feeling better  I still the pain but it's at my usual   My thighs are still n/t but there are better since last appt "      Objective: See treatment diary below      Assessment: Tolerated treatment well  Able to complete program without incident  Relief noted during and after LE distraction  Added knee ext and ham curl machines for good tolerance  Pt has follow up with  today, will await further recommendations  Patient demonstrated fatigue post treatment and would benefit from continued PT      Plan: Continue per plan of care  Progress treatment as tolerated         Precautions: CAD, FALLS, CA hx       Re-eval Date:     Date        Visit Count 6/8       FOTO        Pain In 5/10       Pain Out See IE              Manuals        Trial gentle CPA mobs L/S grades 1-3 as tolerated  Trial gentle  L/S soft tissue mvt  Tolerated; long axis distraction B LE and single 15'                               Neuro Re-Ed        TA supine      TA with bent knee fallouts        20x/3-5"                   TA with heel slides         Dead bugs       Pallof press        grn 10x10" ea       Lifts/chops with tband                                 Ther Ex        Nustep    LTR L3 10' no UE with lumbar roll    Standing repeated L S ext to tolerance AROM 2x10/3-5" throughout session with position changes       DKTC      Piriformis stretch 10x10"      4x30"         Hip flexor stretch         SLRs      Bridges Flex  2x10/3-5"       clamshells        Leg press      Step ups         Knee extension    HS curls 22# 2x10      22# 2x10               Ther Activity                        Gait Training Modalities prn

## 2021-07-15 ENCOUNTER — APPOINTMENT (OUTPATIENT)
Dept: PHYSICAL THERAPY | Facility: CLINIC | Age: 66
End: 2021-07-15
Payer: COMMERCIAL

## 2021-07-19 ENCOUNTER — EVALUATION (OUTPATIENT)
Dept: PHYSICAL THERAPY | Facility: CLINIC | Age: 66
End: 2021-07-19
Payer: COMMERCIAL

## 2021-07-19 DIAGNOSIS — M54.16 LUMBAR RADICULOPATHY: Primary | ICD-10-CM

## 2021-07-19 PROCEDURE — 97112 NEUROMUSCULAR REEDUCATION: CPT

## 2021-07-19 PROCEDURE — 97140 MANUAL THERAPY 1/> REGIONS: CPT

## 2021-07-19 PROCEDURE — 97110 THERAPEUTIC EXERCISES: CPT

## 2021-07-19 NOTE — PROGRESS NOTES
Daily Note     Today's date: 2021  Patient name: Milly Reyes  : 1955  MRN: [id number]  Referring provider: Manoj Bloom MD  Dx:   Encounter Diagnosis     ICD-10-CM    1  Lumbar radiculopathy  M54 16        Start Time:   Stop Time:   Total time in clinic (min): 45 minutes    Subjective: "I saw the Dr last week and they want to do another injection but I want to push that off as long as possible  I did use the inversion table over the weekend and it helped  I forced myself to do something yesterday  I did shoveling and filled potholes  I felt better afterwards "      Objective: See treatment diary below      Assessment: Tolerated treatment well  Pt able to complete all exercises without incident  Pt able to increase activity levels with decreased thigh pain/discomfort  Pt's pain remains at 5/10 baseline with increased mobility  LE strength deficits noted; with some improvements  Will continue to monitor and progress as able  Patient demonstrated fatigue post treatment and would benefit from continued PT      Plan: Continue per plan of care  Progress treatment as tolerated         Precautions: CAD, FALLS, CA hx       Re-eval Date:     Date       Visit Count 8       FOTO        Pain In 5/10 5/10      Pain Out See IE  5/10            Manuals       Trial gentle CPA mobs L/S grades 1-3 as tolerated  Trial gentle  L/S soft tissue mvt  Tolerated; long axis distraction B LE and single 15' Trial gentle  L/S soft tissue mvt  Tolerated; long axis distraction B LE and single 15'                              Neuro Re-Ed        TA supine      TA with bent knee fallouts        20x/3-5"       20x/3-5"                  TA with heel slides         Dead bugs       Pallof press        grn 10x10" ea       grn 10x10" ea      Lifts/chops with tband                                 Ther Ex        Nustep    LTR L3 10' no UE with lumbar roll    Standing repeated L S ext to tolerance AROM 2x10/3-5" throughout session with position changes L3 10' no UE with lumbar roll      DKTC      Piriformis stretch 10x10"      4x30"   10x10"      4x30"      Hip flexor stretch         SLRs      Bridges Flex  2x10/3-5" Flex  2x10/3-5"      clamshells        Leg press      Step ups         Knee extension    HS curls 22# 2x10      22# 2x10 22# 3x10      22# 3x10              Ther Activity                        Gait Training                        Modalities prn

## 2021-07-21 ENCOUNTER — EVALUATION (OUTPATIENT)
Dept: PHYSICAL THERAPY | Facility: CLINIC | Age: 66
End: 2021-07-21
Payer: COMMERCIAL

## 2021-07-21 DIAGNOSIS — M54.16 LUMBAR RADICULOPATHY: Primary | ICD-10-CM

## 2021-07-21 PROCEDURE — 97112 NEUROMUSCULAR REEDUCATION: CPT | Performed by: PHYSICAL MEDICINE & REHABILITATION

## 2021-07-21 PROCEDURE — 97110 THERAPEUTIC EXERCISES: CPT | Performed by: PHYSICAL MEDICINE & REHABILITATION

## 2021-07-21 PROCEDURE — 97140 MANUAL THERAPY 1/> REGIONS: CPT | Performed by: PHYSICAL MEDICINE & REHABILITATION

## 2021-07-21 NOTE — LETTER
2021    Brie Mi MD  [address] [facility] [address]    Patient: Ascencion Esparza   YOB: 1955   Date of Visit: 2021     Encounter Diagnosis     ICD-10-CM    1  Lumbar radiculopathy  M54 16        Dear Dr Sharron Moss: Thank you for your recent referral of Ascencion Esparza  Please review the attached evaluation summary from Arslan's recent visit  Please verify that you agree with the plan of care by signing the attached order  If you have any questions or concerns, please do not hesitate to call  I sincerely appreciate the opportunity to share in the care of one of your patients and hope to have another opportunity to work with you in the near future  Sincerely,    Reymundo Kussmaul, PT      Referring Provider:      I certify that I have read the below Plan of Care and certify the need for these services furnished under this plan of treatment while under my care  Brie Mi MD  [facility] [phone]  Via Fax: [phone]          PT Re-Evaluation     Today's date: 2021  Patient name: Ascencion Esparza  : 1955  MRN: [id number]  Referring provider: Lexa Evans MD  Dx:   Encounter Diagnosis     ICD-10-CM    1  Lumbar radiculopathy  M54 16                   Assessment  Assessment details: Imelda Martines has been compliant with attending PT and completing home exercise program since initial eval and reports overall 30% improvement in pain/sx and function since start of care   Imelda Martines reports and demonstrates decreased pain, increased strength, improved balance and improved overall level of function since initial eval, but is still limited compared to prior level of function  Imelda Martines continues with above listed impairments and would benefit from additional skilled PT to address these deficits to return to prior level of function        Impairments: abnormal gait, abnormal muscle firing, abnormal or restricted ROM, activity intolerance, impaired physical strength, pain with function and poor posture     Symptom irritability: moderateUnderstanding of Dx/Px/POC: good   Prognosis: fair  Prognosis details: Chronic pain/sx , multiple failed prior treatments     Goals  STGs to be achieved in 4-6 weeks:    1  Pt will report reduced overall pain levels "at worst" by at least 2 points (0-10 scale) in order to allow improved tolerance for functional mobility and reduced reliance on medication or modalities for pain relief  - progressing/partially met   2  Pt will demonstrate improved AROM of L/S flexion by at least 5-10* in order to reduce pt difficulty with functional mobility and transfers  - met   3  Pt will demonstrate improved proximal hip and core strength by at least 1/2-1 MMT in order to improve lumbo-pelvic stability to reduce pain and improve function  - met R, progressing L   4  Pt will report overall improved tolerance for sustained mobility/activity by at least 25-50% since [facility] with overall reduced pain levels and reduced fear avoidance  - met     LTGs to be achieved in 8-12 weeks:    1  Pt will be independent with HEP, demonstrating proper technique with exercises and understanding of self progression of program without need for cueing or assistance  - progressing   2  Pt will report minimized pain levels with at least 25% reduction in pain since [facility]  - progressing   3  Pt will demonstrate WFL AROM and strength of B Hips grossly  - progressing   4  FOTO will reflect score at discharge that is greater than or equal to predicted level  - progressing          Plan  Patient would benefit from: skilled physical therapy  Planned modality interventions: cryotherapy and thermotherapy: hydrocollator packs  Planned therapy interventions: abdominal trunk stabilization, manual therapy, motor coordination training, neuromuscular re-education, patient education, self care, strengthening, stretching, therapeutic activities, therapeutic exercise and home exercise program  Frequency: 2x week  Duration in visits: 8  Duration in weeks: 4  Plan of Care beginning date: 2021  Plan of Care expiration date: 2021  Treatment plan discussed with: patient        Subjective Evaluation    History of Present Illness  Mechanism of injury: Pt notes at least 30% improvement in pain/sx to date  Notes improving B LE strength  Notes his chronic baseline LBP is unchanged  Pain into his LEs "is a little better"  LE pain is not as intense and is not triggered as easily  Feels his balance is even better as well  Can tolerate more activity now before pain is worsened  Notes cont'd constant pain into R post/lateral LE to anterior medial R lower leg; constant/unchanged  Notes cont'd burning pain B anterior thighs to knees; improved  No new pain  No n/t or sensory changes  No new or progressive weakness  Cont worsening of sx 1* sitting /driving  RTD in about a month  No new sx/complaints  Recurrent probem    Quality of life: good    Pain  Current pain ratin  At best pain ratin  At worst pain ratin  Location: R L/S, R LE as noted above , L/R thigh   Quality: sharp, dull ache and burning  Progression: improved    Social Support  Steps to enter house: yes  Stairs in house: no   L[facility] in: [facility]  Lives with: spouse    Employment status: working ([employer]work)  Hand dominance: right    Treatments  Previous treatment: medication, injection treatment and chiropractic  Current treatment: injection treatment, medication and physical therapy  Patient Goals  Patient goals for therapy: decreased pain, increased motion and increased strength          Objective     Concurrent Complaints  Positive for night pain, disturbed sleep and history of cancer   Negative for bladder dysfunction, bowel dysfunction, saddle (S4) numbness, history of trauma and infection    Postural Observations  Seated posture: poor  Standing posture: fair  Correction of posture: has no consistent effect    Additional Postural Observation Details  Seated: Forward head/rounded shoulders  Increased thoracic kyphosis   B scapular depression and protraction with mild winging   Increased PPT in sitting    Standing:  Increased VALERI  Reduced lumbar lordosis         Tenderness     Lumbar Spine  No tenderness in the spinous process  Left Hip   No tenderness in the PSIS  Right Hip   No tenderness in the PSIS  Neurological Testing     Sensation     Lumbar   Left   Intact: light touch  Diminished: light touch    Right   Intact: light touch  Diminished: light touch    Reflexes   Left   Patellar (L4): normal (2+)  Achilles (S1): normal (2+)  Clonus sign: negative    Right   Patellar (L4): normal (2+)  Achilles (S1): normal (2+)  Clonus sign: negative    Additional Neurological Details  Notes diminished sensation B thighs to knees anteriorly - no change   Diminished sensation reported R vs L LE into R lower leg and R foot anterior/laterally - no change   Normal sensation reported posterior thighs/calves B   Notes this is chronic and unchanging   Will cont to monitor     Active Range of Motion     Lumbar   Flexion: 40 ("tight" B HS, pain L L/S/posterior hip ) degrees   Extension: Active lumbar extension: "feels better" in thighs  WFL  Left lateral flexion: Active left lumbar lateral flexion: Pain L lateral thigh at end range  with pain Restriction level: minimal  Right lateral flexion: Active right lumbar lateral flexion: pain in R lateral thigh at end range  with pain Restriction level: minimal  Left rotation: Active left lumbar rotation: "stiff" only  WFL  Right rotation: Active right lumbar rotation: no complaints    [facility]    Additional Active Range of Motion Details  No effect on LE sx with AROM L/S in standing  No effect on sx following AROM assessment  Limited L/S flexion mobility with L/S forward flexion - improved but cont to be limited by B HS tightness   Will cont to assess Strength/Myotome Testing     Left Hip   Planes of Motion   Flexion: 4-  Abduction: 4  Adduction: 4+    Right Hip   Planes of Motion   Flexion: 4 (R L/S pain)  Abduction: 4+  Adduction: 4+    Left Knee   Flexion: 4  Extension: 4    Right Knee   Flexion: 4+ (Pain R L/S )  Extension: 4+ (Pain R L/S)    Left Ankle/Foot   Dorsiflexion: 4  Plantar flexion: 5    Right Ankle/Foot   Dorsiflexion: 4+  Plantar flexion: 5    Additional Strength Details  Discomfort R L/S with MMT screening seated EOT - cont   No distal sx   General proximal weakness noted B - improved R, no change L   Cont with LLE weakness vs R  Will cont to assess    Muscle Activation   Patient able to activate left transverse abdominals and right transverse abdominals  Additional Muscle Activation Details  Diminished activation of TA with transfers/mobility - improving with program/PT exercises/training     Tests     Lumbar     Left   Negative crossed SLR and passive SLR  Right   Negative crossed SLR and passive SLR  Left Hip   Negative REGAN and FADIR  Right Hip   Positive REGAN  Negative FADIR       Additional Tests Details  R/L SLR to <45* B with c/o HS pain/tightness; R improved to 50/60* with HS tightness at RE   Hip flexor tightness suggested by posture; will cont to assess   + R REGAN for local R L/S /posterior thigh discomfort at end range    Moderate hypomobility B hips - mild on R at RE   No effect on pain with R /L hip PROM assessment   Will cont to assess    Ambulation     Quality of Movement During Gait     Additional Quality of Movement During Gait Details  Slow hema  Flexed forward- improved   Shuffling gait- improved   Reduced trunk/arm swing              Precautions: CAD, FALLS, CA hx       Re-eval Date: 7/24    Date 7/14 7/19 7/21     Visit Count 6/8 7/8 8/8     FOTO 6/24       Pain In 5/10 5/10 6/10     Pain Out See IE  5/10 5/10           Manuals 7/14 7/19 7/21     Trial gentle CPA mobs L/S grades 1-3 as tolerated  Trial gentle  L/S soft tissue mvt  Tolerated; long axis distraction B LE and single 15' Trial gentle  L/S soft tissue mvt  Tolerated; long axis distraction B LE and single 15' Trial gentle  L/S soft tissue mvt  Tolerated; long axis distraction B LE and single 10'                             Neuro Re-Ed        TA supine      TA with bent knee fallouts        20x/3-5"       20x/3-5"       20x/3-5"                 TA with heel slides    2x10 ea alt  Cues      Dead bugs       Pallof press        grn 10x10" ea       grn 10x10" ea         Resume      Lifts/chops with tband                                 Ther Ex        Nustep    LTR L3 10' no UE with lumbar roll    Standing repeated L S ext to tolerance AROM 2x10/3-5" throughout session with position changes L3 10' no UE with lumbar roll L3 10' no UE with lumbar roll     DKTC      Piriformis stretch 10x10"      4x30"   10x10"      4x30" 10x10"      4x30"           Hip flexor stretch         SLRs            Bridges Flex  2x10/3-5" Flex  2x10/3-5" Flex  2x10/3-5"  Abduction   2x10/3-5"ea      2x10/5"          clamshells        Leg press      Step ups         Knee extension    HS curls 22# 2x10      22# 2x10 22# 3x10      22# 3x10 22# 3x10      22# 3x10             Ther Activity                        Gait Training                        Modalities prn

## 2021-07-21 NOTE — PROGRESS NOTES
PT Re-Evaluation     Today's date: 2021  Patient name: Lora Whatley  : 1955  MRN: [id number]  Referring provider: Jem Garcia MD  Dx:   Encounter Diagnosis     ICD-10-CM    1  Lumbar radiculopathy  M54 16                   Assessment  Assessment details: Kermit Hathaway has been compliant with attending PT and completing home exercise program since initial eval and reports overall 30% improvement in pain/sx and function since start of care   Kermit Hathaway reports and demonstrates decreased pain, increased strength, improved balance and improved overall level of function since initial eval, but is still limited compared to prior level of function  Kermit Hathaway continues with above listed impairments and would benefit from additional skilled PT to address these deficits to return to prior level of function  Impairments: abnormal gait, abnormal muscle firing, abnormal or restricted ROM, activity intolerance, impaired physical strength, pain with function and poor posture     Symptom irritability: moderateUnderstanding of Dx/Px/POC: good   Prognosis: fair  Prognosis details: Chronic pain/sx , multiple failed prior treatments     Goals  STGs to be achieved in 4-6 weeks:    1  Pt will report reduced overall pain levels "at worst" by at least 2 points (0-10 scale) in order to allow improved tolerance for functional mobility and reduced reliance on medication or modalities for pain relief  - progressing/partially met   2  Pt will demonstrate improved AROM of L/S flexion by at least 5-10* in order to reduce pt difficulty with functional mobility and transfers  - met   3  Pt will demonstrate improved proximal hip and core strength by at least 1/2-1 MMT in order to improve lumbo-pelvic stability to reduce pain and improve function  - met R, progressing L   4  Pt will report overall improved tolerance for sustained mobility/activity by at least 25-50% since [facility] with overall reduced pain levels and reduced fear avoidance   - met     LTGs to be achieved in 8-12 weeks:    1  Pt will be independent with HEP, demonstrating proper technique with exercises and understanding of self progression of program without need for cueing or assistance  - progressing   2  Pt will report minimized pain levels with at least 25% reduction in pain since [facility]  - progressing   3  Pt will demonstrate WFL AROM and strength of B Hips grossly  - progressing   4  FOTO will reflect score at discharge that is greater than or equal to predicted level  - progressing          Plan  Patient would benefit from: skilled physical therapy  Planned modality interventions: cryotherapy and thermotherapy: hydrocollator packs  Planned therapy interventions: abdominal trunk stabilization, manual therapy, motor coordination training, neuromuscular re-education, patient education, self care, strengthening, stretching, therapeutic activities, therapeutic exercise and home exercise program  Frequency: 2x week  Duration in visits: 8  Duration in weeks: 4  Plan of Care beginning date: 2021  Plan of Care expiration date: 2021  Treatment plan discussed with: patient        Subjective Evaluation    History of Present Illness  Mechanism of injury: Pt notes at least 30% improvement in pain/sx to date  Notes improving B LE strength  Notes his chronic baseline LBP is unchanged  Pain into his LEs "is a little better"  LE pain is not as intense and is not triggered as easily  Feels his balance is even better as well  Can tolerate more activity now before pain is worsened  Notes cont'd constant pain into R post/lateral LE to anterior medial R lower leg; constant/unchanged  Notes cont'd burning pain B anterior thighs to knees; improved  No new pain  No n/t or sensory changes  No new or progressive weakness  Cont worsening of sx 1* sitting /driving  RTD in about a month  No new sx/complaints             Recurrent probem    Quality of life: good    Pain  Current pain ratin  At best pain ratin  At worst pain ratin  Location: R L/S, R LE as noted above , L/R thigh   Quality: sharp, dull ache and burning  Progression: improved    Social Support  Steps to enter house: yes  Stairs in house: no   Lives in: [place] house  Lives with: spouse    Employment status: working (Paperwork)  Hand dominance: right    Treatments  Previous treatment: medication, injection treatment and chiropractic  Current treatment: injection treatment, medication and physical therapy  Patient Goals  Patient goals for therapy: decreased pain, increased motion and increased strength          Objective     Concurrent Complaints  Positive for night pain, disturbed sleep and history of cancer  Negative for bladder dysfunction, bowel dysfunction, saddle (S4) numbness, history of trauma and infection    Postural Observations  Seated posture: poor  Standing posture: fair  Correction of posture: has no consistent effect    Additional Postural Observation Details  Seated: Forward head/rounded shoulders  Increased thoracic kyphosis   B scapular depression and protraction with mild winging   Increased PPT in sitting    Standing:  Increased VALERI  Reduced lumbar lordosis         Tenderness     Lumbar Spine  No tenderness in the spinous process  Left Hip   No tenderness in the PSIS  Right Hip   No tenderness in the PSIS       Neurological Testing     Sensation     Lumbar   Left   Intact: light touch  Diminished: light touch    Right   Intact: light touch  Diminished: light touch    Reflexes   Left   Patellar (L4): normal (2+)  Achilles (S1): normal (2+)  Clonus sign: negative    Right   Patellar (L4): normal (2+)  Achilles (S1): normal (2+)  Clonus sign: negative    Additional Neurological Details  Notes diminished sensation B thighs to knees anteriorly - no change   Diminished sensation reported R vs L LE into R lower leg and R foot anterior/laterally - no change   Normal sensation reported posterior thighs/calves B   Notes this is chronic and unchanging   Will cont to monitor     Active Range of Motion     Lumbar   Flexion: 40 ("tight" B HS, pain L L/S/posterior hip ) degrees   Extension: Active lumbar extension: "feels better" in thighs  WFL  Left lateral flexion: Active left lumbar lateral flexion: Pain L lateral thigh at end range  with pain Restriction level: minimal  Right lateral flexion: Active right lumbar lateral flexion: pain in R lateral thigh at end range  with pain Restriction level: minimal  Left rotation: Active left lumbar rotation: "stiff" only  WFL  Right rotation: Active right lumbar rotation: no complaints  [facility]    Additional Active Range of Motion Details  No effect on LE sx with AROM L/S in standing  No effect on sx following AROM assessment  Limited L/S flexion mobility with L/S forward flexion - improved but cont to be limited by B HS tightness   Will cont to assess           Strength/Myotome Testing     Left Hip   Planes of Motion   Flexion: 4-  Abduction: 4  Adduction: 4+    Right Hip   Planes of Motion   Flexion: 4 (R L/S pain)  Abduction: 4+  Adduction: 4+    Left Knee   Flexion: 4  Extension: 4    Right Knee   Flexion: 4+ (Pain R L/S )  Extension: 4+ (Pain R L/S)    Left Ankle/Foot   Dorsiflexion: 4  Plantar flexion: 5    Right Ankle/Foot   Dorsiflexion: 4+  Plantar flexion: 5    Additional Strength Details  Discomfort R L/S with MMT screening seated EOT - cont   No distal sx   General proximal weakness noted B - improved R, no change L   Cont with LLE weakness vs R  Will cont to assess    Muscle Activation   Patient able to activate left transverse abdominals and right transverse abdominals  Additional Muscle Activation Details  Diminished activation of TA with transfers/mobility - improving with program/PT exercises/training     Tests     Lumbar     Left   Negative crossed SLR and passive SLR  Right   Negative crossed SLR and passive SLR  Left Hip   Negative REGAN and FADIR       Right Hip   Positive REGAN    Negative FADIR       Additional Tests Details  R/L SLR to <45* B with c/o HS pain/tightness; R improved to 50/60* with HS tightness at RE   Hip flexor tightness suggested by posture; will cont to assess   + R REGAN for local R L/S /posterior thigh discomfort at end range    Moderate hypomobility B hips - mild on R at RE   No effect on pain with R /L hip PROM assessment   Will cont to assess    Ambulation     Quality of Movement During Gait     Additional Quality of Movement During Gait Details  Slow hema  Flexed forward- improved   Shuffling gait- improved   Reduced trunk/arm swing              Precautions: CAD, FALLS, CA hx       Re-eval Date: 7/24    Date 7/14 7/19 7/21     Visit Count 6/8 7/8 8/8     FOTO 6/24       Pain In 5/10 5/10 6/10     Pain Out See IE  5/10 5/10           Manuals 7/14 7/19 7/21     Trial gentle CPA mobs L/S grades 1-3 as tolerated  Trial gentle  L/S soft tissue mvt  Tolerated; long axis distraction B LE and single 15' Trial gentle  L/S soft tissue mvt  Tolerated; long axis distraction B LE and single 15' Trial gentle  L/S soft tissue mvt  Tolerated; long axis distraction B LE and single 10'                             Neuro Re-Ed        TA supine      TA with bent knee fallouts        20x/3-5"       20x/3-5"       20x/3-5"                 TA with heel slides    2x10 ea alt  Cues      Dead bugs       Pallof press        grn 10x10" ea       grn 10x10" ea         Resume      Lifts/chops with tband                                 Ther Ex        Nustep    LTR L3 10' no UE with lumbar roll    Standing repeated L S ext to tolerance AROM 2x10/3-5" throughout session with position changes L3 10' no UE with lumbar roll L3 10' no UE with lumbar roll     DKTC      Piriformis stretch 10x10"      4x30"   10x10"      4x30" 10x10"      4x30"           Hip flexor stretch         SLRs            Bridges Flex  2x10/3-5" Flex  2x10/3-5" Flex  2x10/3-5"  Abduction   2x10/3-5"ea      2x10/5" [place]        Leg press      Step ups         Knee extension    HS curls 22# 2x10      22# 2x10 22# 3x10      22# 3x10 22# 3x10      22# 3x10             Ther Activity                        Gait Training                        Modalities prn

## 2021-07-26 ENCOUNTER — OFFICE VISIT (OUTPATIENT)
Dept: PHYSICAL THERAPY | Facility: CLINIC | Age: 66
End: 2021-07-26
Payer: COMMERCIAL

## 2021-07-26 DIAGNOSIS — M54.16 LUMBAR RADICULOPATHY: Primary | ICD-10-CM

## 2021-07-26 PROCEDURE — 97140 MANUAL THERAPY 1/> REGIONS: CPT

## 2021-07-26 PROCEDURE — 97110 THERAPEUTIC EXERCISES: CPT

## 2021-07-26 PROCEDURE — 97112 NEUROMUSCULAR REEDUCATION: CPT

## 2021-07-26 NOTE — PROGRESS NOTES
Daily Note     Today's date: 2021  Patient name: Wojciech Rose  : 1955  MRN: [id number]  Referring provider: Max Lind MD  Dx:   Encounter Diagnosis     ICD-10-CM    1  Lumbar radiculopathy  M54 16        Start Time: 6395  Stop Time: 8409  Total time in clinic (min): 45 minutes    Subjective: "I was in more pain about an hour ago from sitting  It has gone down since then  It's back to my normal LBP "      Objective: See treatment diary below      Assessment: Tolerated treatment well  Pt able to complete all exercises without incident  Intermittent relief noted with long axis distraction; pain returns to 5/10  Will continue to progress as able  Patient demonstrated fatigue post treatment and would benefit from continued PT      Plan: Continue per plan of care  Progress treatment as tolerated         Precautions: CAD, FALLS, CA hx       Re-eval Date:     Date     Visit Count 8     FOTO        Pain In 10 5/10 6/10 /10    Pain Out See IE  5/10 5/10 5/10          Manuals     Trial gentle CPA mobs L/S grades 1-3 as tolerated  Trial gentle  L/S soft tissue mvt  Tolerated; long axis distraction B LE and single 15' Trial gentle  L/S soft tissue mvt  Tolerated; long axis distraction B LE and single 15' Trial gentle  L/S soft tissue mvt  Tolerated; long axis distraction B LE and single 10' long axis distraction B LE and single 10'                            Neuro Re-Ed        TA supine      TA with bent knee fallouts        20x/3-5"       20x/3-5"       20x/3-5"       20x/3-5"              TA with heel slides    2x10 ea alt  Cues  2x10 ea alt  Cues     Dead bugs       Pallof press        grn 10x10" ea       grn 10x10" ea         Resume      grn 10x10" ea    Lifts/chops with tband                                 Ther Ex        Nustep    LTR L3 10' no UE with lumbar roll    Standing repeated L S ext to tolerance AROM 2x10/3-5" throughout session with position changes L3 10' no UE with lumbar roll L3 10' no UE with lumbar roll L3 10' no UE with lumbar roll    DKTC      Piriformis stretch 10x10"      4x30"   10x10"      4x30" 10x10"      4x30"       10x10"      4x30"    Hip flexor stretch         SLRs            Bridges Flex  2x10/3-5" Flex  2x10/3-5" Flex  2x10/3-5"  Abduction   2x10/3-5"ea      2x10/5"      Flex  2x10/3-5"  Abduction   2x10/3-5"ea      2x10/5"     clamshells        Leg press      Step ups         Knee extension    HS curls 22# 2x10      22# 2x10 22# 3x10      22# 3x10 22# 3x10      22# 3x10 22# 3x10      22# 3x10            Ther Activity                        Gait Training                        Modalities prn

## 2021-07-28 ENCOUNTER — OFFICE VISIT (OUTPATIENT)
Dept: PHYSICAL THERAPY | Facility: CLINIC | Age: 66
End: 2021-07-28
Payer: COMMERCIAL

## 2021-07-28 DIAGNOSIS — M54.16 LUMBAR RADICULOPATHY: Primary | ICD-10-CM

## 2021-07-28 PROCEDURE — 97112 NEUROMUSCULAR REEDUCATION: CPT

## 2021-07-28 PROCEDURE — 97110 THERAPEUTIC EXERCISES: CPT

## 2021-07-28 PROCEDURE — 97140 MANUAL THERAPY 1/> REGIONS: CPT

## 2021-07-28 NOTE — PROGRESS NOTES
Daily Note     Today's date: 2021  Patient name: Margarito Crawford  : 1955  MRN: [id number]  Referring provider: Richard Toscano MD  Dx:   Encounter Diagnosis     ICD-10-CM    1  Lumbar radiculopathy  M54 16        Start Time:   Stop Time: 781  Total time in clinic (min): 1365 minutes    Subjective: "I am a little more sore and stiff today; maybe from the weather change that is coming "      Objective: See treatment diary below      Assessment: Tolerated treatment well  Pt able to complete program without incident  No progression 2* challenged with current exercises  Improved FOTO score noted since [facility]  LE weakness continues, unable to increase weight 2* challenged with current amount  Will continue to progress as able to return to PLOF  Patient demonstrated fatigue post treatment and would benefit from continued PT      Plan: Continue per plan of care  Progress treatment as tolerated         Precautions: CAD, FALLS, CA hx       Re-eval Date:     Date    Visit Count 6/8 7/8 8/8 9/16 10/16   FOTO    Pain In 10 5/10 6/10 5/10 5/10   Pain Out See IE  5/10 5/10 5/10 5/10         Manuals    Trial gentle CPA mobs L/S grades 1-3 as tolerated  Trial gentle  L/S soft tissue mvt  Tolerated; long axis distraction B LE and single 15' Trial gentle  L/S soft tissue mvt  Tolerated; long axis distraction B LE and single 15' Trial gentle  L/S soft tissue mvt  Tolerated; long axis distraction B LE and single 10' long axis distraction B LE and single 10' long axis distraction B LE and single 10'                           Neuro Re-Ed        TA supine      TA with bent knee fallouts        20x/3-5"       20x/3-5"       20x/3-5"       20x/3-5"           20x/3-5"       TA with heel slides    2x10 ea alt  Cues  2x10 ea alt  Cues  2x10 ea alt  Cues    Dead bugs       Pallof press        grn 10x10" ea       grn 10x10" ea         Resume      grn 10x10" ea Blue  10x10"   Lifts/chops with tband                                 Ther Ex        Nustep    LTR L3 10' no UE with lumbar roll    Standing repeated L S ext to tolerance AROM 2x10/3-5" throughout session with position changes L3 10' no UE with lumbar roll L3 10' no UE with lumbar roll L3 10' no UE with lumbar roll L3 10' no UE with lumbar roll   DKTC      Piriformis stretch 10x10"      4x30"   10x10"      4x30" 10x10"      4x30"       10x10"      4x30" 10x10"   Hip flexor stretch         SLRs            Bridges Flex  2x10/3-5" Flex  2x10/3-5" Flex  2x10/3-5"  Abduction   2x10/3-5"ea      2x10/5"      Flex  2x10/3-5"  Abduction   2x10/3-5"ea      2x10/5"  Flex  2x10/3-5"  Abduction   2x10/3-5"ea      2x10/5"    clamshells        Leg press      Step ups         Knee extension    HS curls 22# 2x10      22# 2x10 22# 3x10      22# 3x10 22# 3x10      22# 3x10 22# 3x10      22# 3x10 22# 3x10      22# 3x10           Ther Activity                        Gait Training                        Modalities prn

## 2021-08-02 ENCOUNTER — OFFICE VISIT (OUTPATIENT)
Dept: PHYSICAL THERAPY | Facility: CLINIC | Age: 66
End: 2021-08-02
Payer: COMMERCIAL

## 2021-08-02 DIAGNOSIS — M54.16 LUMBAR RADICULOPATHY: Primary | ICD-10-CM

## 2021-08-02 PROCEDURE — 97140 MANUAL THERAPY 1/> REGIONS: CPT

## 2021-08-02 PROCEDURE — 97112 NEUROMUSCULAR REEDUCATION: CPT

## 2021-08-02 PROCEDURE — 97110 THERAPEUTIC EXERCISES: CPT

## 2021-08-02 NOTE — PROGRESS NOTES
Daily Note     Today's date: 2021  Patient name: Char Motley  : 1955  MRN: [id number]  Referring provider: Destini Thomas MD  Dx:   Encounter Diagnosis     ICD-10-CM    1  Lumbar radiculopathy  M54 16        Start Time:   Stop Time: 97  Total time in clinic (min): 45 minutes    Subjective: "I am the same  I am able to do more but the pain is there  I need to do the hanging more "      Objective: See treatment diary below      Assessment: Tolerated treatment well  Pt able to complete all exercises without incident  Long axis distraction continues to give relief for LBP, baseline pain is 5/10, never below  Progressed core strengthening with appropriate challenged, no increase in symptoms  Decreased LE strength noted, increased reps to 15 with quick muscle fatigue noted  Will continue to progress as able  Patient demonstrated fatigue post treatment and would benefit from continued PT      Plan: Continue per plan of care  Progress treatment as tolerated         Precautions: CAD, FALLS, CA hx       Re-eval Date:     Date        Visit Count        FOTO        Pain In 5/10       Pain Out See IE              Manuals        Trial gentle CPA mobs L/S grades 1-3 as tolerated  long axis distraction B LE and single 10'                               Neuro Re-Ed        TA supine      TA with bent knee fallouts        Reviewed HEP       TA with heel slides  2x10 ea alt  Cues        Dead bugs       Pallof press  LE 2x10  UE 2x10      blue 10x10" ea       Lifts/chops with tband                                 Ther Ex        Nustep    LTR L3 10' no UE with lumbar roll    Standing repeated L S ext to tolerance AROM 2x10/3-5" throughout session with position changes       DKTC      Piriformis stretch Resume      resume       Hip flexor stretch         SLRs            Bridges Flex  2x10/3-5"  Abduction   2x10/3-5"ea      2x10/5"              clamshells        Leg press      Step ups Knee extension    HS curls 22# 2x15      22# 2x15               Ther Activity                        Gait Training                        Modalities prn

## 2021-08-05 ENCOUNTER — OFFICE VISIT (OUTPATIENT)
Dept: PHYSICAL THERAPY | Facility: CLINIC | Age: 66
End: 2021-08-05
Payer: COMMERCIAL

## 2021-08-05 DIAGNOSIS — M54.16 LUMBAR RADICULOPATHY: Primary | ICD-10-CM

## 2021-08-05 PROCEDURE — 97110 THERAPEUTIC EXERCISES: CPT

## 2021-08-05 PROCEDURE — 97112 NEUROMUSCULAR REEDUCATION: CPT

## 2021-08-05 PROCEDURE — 97140 MANUAL THERAPY 1/> REGIONS: CPT

## 2021-08-05 NOTE — PROGRESS NOTES
Daily Note     Today's date: 2021  Patient name: Gino Mackay  : 1955  MRN: [id number]  Referring provider: Urszula Block MD  Dx:   Encounter Diagnosis     ICD-10-CM    1  Lumbar radiculopathy  M54 16        Start Time:   Stop Time: 48  Total time in clinic (min): 45 minutes    Subjective: Pt offered no new concerns  Objective: See treatment diary below      Assessment: Tolerated treatment well  Pt able to complete program without incident  Progressing with exercises, able to increase reps; no change in pain levels  Pt reports baseline pain, never better  Pt with good control movements during session with core activation  Will continue to progress as able  Patient demonstrated fatigue post treatment and would benefit from continued PT      Plan: Continue per plan of care  Progress treatment as tolerated         Precautions: CAD, FALLS, CA hx     RTD 21  Re-eval Date:     Date       Visit Count       FOTO        Pain In 5/10 5/10      Pain Out See IE  5/10            Manuals       Trial gentle CPA mobs L/S grades 1-3 as tolerated  long axis distraction B LE and single 10' long axis distraction B LE and single 10'                              Neuro Re-Ed        TA supine      TA with bent knee fallouts        Reviewed HEP       TA with heel slides  2x10 ea alt  Cues  2x10 ea alt  Cues       Dead bugs       Pallof press  LE 2x10  UE 2x10      blue 10x10" ea LE 2x10  UE 2x10      blue 10x10" ea      Lifts/chops with tband                                 Ther Ex        Nustep    LTR L3 10' no UE with lumbar roll    Standing repeated L S ext to tolerance AROM 2x10/3-5" throughout session with position changes L3 10' no UE with lumbar roll      DKTC      Piriformis stretch Resume      resume       Hip flexor stretch         SLRs            Bridges Flex  2x10/3-5"  Abduction   2x10/3-5"ea      2x10/5"  Flex  2x10/3-5"  Abduction   2x10/3-5"ea      3x10/5" [place]        Leg press      Step ups         Knee extension    HS curls 22# 2x15      22# 2x15 22# 2x15      22# 2x15              Ther Activity                        Gait Training                        Modalities prn

## 2021-08-09 ENCOUNTER — OFFICE VISIT (OUTPATIENT)
Dept: PHYSICAL THERAPY | Facility: CLINIC | Age: 66
End: 2021-08-09
Payer: COMMERCIAL

## 2021-08-09 DIAGNOSIS — M54.16 LUMBAR RADICULOPATHY: Primary | ICD-10-CM

## 2021-08-09 PROCEDURE — 97110 THERAPEUTIC EXERCISES: CPT

## 2021-08-09 PROCEDURE — 97140 MANUAL THERAPY 1/> REGIONS: CPT

## 2021-08-09 PROCEDURE — 97112 NEUROMUSCULAR REEDUCATION: CPT

## 2021-08-09 NOTE — PROGRESS NOTES
Daily Note     Today's date: 2021  Patient name: Bismark Em  : 1955  MRN: [id number]  Referring provider: Virgil Gillis MD  Dx:   Encounter Diagnosis     ICD-10-CM    1  Lumbar radiculopathy  M54 16        Start Time:   Stop Time: 90  Total time in clinic (min): 50 minutes    Subjective: "I did a lot of grass cutting, weed wracking and tried to use my  but it was broken  I took my pills different and was able to for go the tramadol yesterday  I am trying to not take that as much and take the lower dosages of gabapentin "      Objective: See treatment diary below      Assessment: Tolerated treatment well  Pt able to prolong taking pain meds and decreased amount throughout the day  Pt continues to be challenged with current program   Increased resistance on nu-step and pt struggled to complete  Pt would benefit from continued LE and core strengthening to return to PLOF  Patient demonstrated fatigue post treatment and would benefit from continued PT      Plan: Continue per plan of care  Progress treatment as tolerated  Precautions: CAD, FALLS, CA hx     RTD 21  Re-eval Date:     Date      Visit Count      FOTO        Pain In 5/10 5/10 5/10     Pain Out See IE  5/10            Manuals      Trial gentle CPA mobs L/S grades 1-3 as tolerated  long axis distraction B LE and single 10' long axis distraction B LE and single 10' long axis distraction B LE and single 10'                             Neuro Re-Ed        TA supine      TA with bent knee fallouts        Reviewed HEP       TA with heel slides  2x10 ea alt  Cues  2x10 ea alt  Cues       Dead bugs       Pallof press  LE 2x10  UE 2x10      blue 10x10" ea LE 2x10  UE 2x10      blue 10x10" ea Dead bugs  2x10 ea    Dbl grn  10x10" ea     Lifts/chops with tband                                 Ther Ex        Nustep    LTR L3 10' no UE with lumbar roll    Standing repeated L  Daniele Young ext to tolerance AROM 2x10/3-5" throughout session with position changes L3 10' no UE with lumbar roll L4 10' no UE with lumbar roll     DKTC      Piriformis stretch Resume      resume       Hip flexor stretch         SLRs            Bridges Flex  2x10/3-5"  Abduction   2x10/3-5"ea      2x10/5"  Flex  2x10/3-5"  Abduction   2x10/3-5"ea      3x10/5"  Flex  2x10/3-5"        3x10/5"            clamshells        Leg press      Step ups         Knee extension    HS curls 22# 2x15      22# 2x15 22# 2x15      22# 2x15 22# 2x15      22# 2x15             Ther Activity                        Gait Training                        Modalities prn

## 2021-08-12 ENCOUNTER — OFFICE VISIT (OUTPATIENT)
Dept: PHYSICAL THERAPY | Facility: CLINIC | Age: 66
End: 2021-08-12
Payer: COMMERCIAL

## 2021-08-12 DIAGNOSIS — M54.16 LUMBAR RADICULOPATHY: Primary | ICD-10-CM

## 2021-08-12 PROCEDURE — 97112 NEUROMUSCULAR REEDUCATION: CPT

## 2021-08-12 PROCEDURE — 97110 THERAPEUTIC EXERCISES: CPT

## 2021-08-12 PROCEDURE — 97140 MANUAL THERAPY 1/> REGIONS: CPT

## 2021-08-12 NOTE — PROGRESS NOTES
Daily Note     Today's date: 2021  Patient name: Guerita Cifuentes  : 1955  MRN: [id number]  Referring provider: Lesly Cuellar MD  Dx:   Encounter Diagnosis     ICD-10-CM    1  Lumbar radiculopathy  M54 16        Start Time: 1500  Stop Time: 7810  Total time in clinic (min): 45 minutes    Subjective: "I know I am getting stronger and I know I should have came to therapy right after my surgery  I can carry a case a water into the house without help  I think I am going to try to chiropractor next to help with the pain "      Objective: See treatment diary below      Assessment: Tolerated treatment well  Slow and steady progression with therapy  Strength gains noted, able to increase reps and with less of a struggle  Pain has been at a 5/10 consistently and is a functional level  Pt to begin to transition to HEP next week  Patient demonstrated fatigue post treatment and would benefit from continued PT      Plan: Continue per plan of care  Progress treatment as tolerated         Precautions: CAD, FALLS, CA hx     RTD 21  Re-eval Date:     Date     Visit Count     FOTO        Pain In 10 5/10 5/10 5/10    Pain Out See IE  5/10  5/10          Manuals     Trial gentle CPA mobs L/S grades 1-3 as tolerated  long axis distraction B LE and single 10' long axis distraction B LE and single 10' long axis distraction B LE and single 10' shira axis distraction B LE and single 10'                            Neuro Re-Ed        TA supine      TA with bent knee fallouts        Reviewed HEP       TA with heel slides  2x10 ea alt  Cues  2x10 ea alt  Cues       Dead bugs       Pallof press  LE 2x10  UE 2x10      blue 10x10" ea LE 2x10  UE 2x10      blue 10x10" ea Dead bugs  2x10 ea    Dbl grn  10x10" ea Dead bugs  2x10 ea    Dbl grn  10x10" ea    Lifts/chops with tband                                 Ther Ex        Nustep    LTR L3 10' no UE with lumbar roll    Standing repeated L S ext to tolerance AROM 2x10/3-5" throughout session with position changes L3 10' no UE with lumbar roll L4 10' no UE with lumbar roll L4 10' no UE with lumbar roll    DKTC      Piriformis stretch Resume      resume       Hip flexor stretch         SLRs            Bridges Flex  2x10/3-5"  Abduction   2x10/3-5"ea      2x10/5"  Flex  2x10/3-5"  Abduction   2x10/3-5"ea      3x10/5"  Flex  2x10/3-5"        3x10/5"  Flex  3x10/3-5"  Abduction   3x10/3-5"ea      3x10/5"          clamshells        Leg press      Step ups         Knee extension    HS curls 22# 2x15      22# 2x15 22# 2x15      22# 2x15 22# 2x15      22# 2x15 22# 2x15      22# 2x15            Ther Activity                        Gait Training                        Modalities prn

## 2021-08-16 ENCOUNTER — APPOINTMENT (OUTPATIENT)
Dept: PHYSICAL THERAPY | Facility: CLINIC | Age: 66
End: 2021-08-16
Payer: COMMERCIAL

## 2021-08-19 ENCOUNTER — OFFICE VISIT (OUTPATIENT)
Dept: PHYSICAL THERAPY | Facility: CLINIC | Age: 66
End: 2021-08-19
Payer: COMMERCIAL

## 2021-08-19 DIAGNOSIS — M54.16 LUMBAR RADICULOPATHY: Primary | ICD-10-CM

## 2021-08-19 PROCEDURE — 97110 THERAPEUTIC EXERCISES: CPT

## 2021-08-19 PROCEDURE — 97140 MANUAL THERAPY 1/> REGIONS: CPT

## 2021-08-19 PROCEDURE — 97112 NEUROMUSCULAR REEDUCATION: CPT

## 2021-08-19 NOTE — PROGRESS NOTES
Daily Note     Today's date: 2021  Patient name: Alphonse Aguilar  : 1955  MRN: [id number]  Referring provider: Fredy Bob MD  Dx:   Encounter Diagnosis     ICD-10-CM    1  Lumbar radiculopathy  M54 16        Start Time:   Stop Time:   Total time in clinic (min): 55 minutes    Subjective: "Today is my last day  I am going to try the chiropractor next "      Objective: See treatment diary below      Assessment: Tolerated treatment well  Improved strength and mobility since starting therapy  Pt able to decrease pain to 5/10, which is baseline and functional   Pt to transition to HEP and trial of chiropractic services    Patient demonstrated fatigue post treatment and exhibited good technique with therapeutic exercises      Plan: Discharge to [facility]     Precautions: CAD, FALLS, CA hx     RTD 21  Re-eval Date:     Date    Visit Count 11/16 12/16 13/16 14/16 15/16   FOTO    Pain In 5/10 5/10 5/10 5/10 5/10   Pain Out See IE  5/10  5/10 5/10         Manuals    Trial gentle CPA mobs L/S grades 1-3 as tolerated  long axis distraction B LE and single 10' long axis distraction B LE and single 10' long axis distraction B LE and single 10' shira axis distraction B LE and single 10' Long axis distraction B LE and single 10'                           Neuro Re-Ed        TA supine      TA with bent knee fallouts        Reviewed HEP       TA with heel slides  2x10 ea alt  Cues  2x10 ea alt  Cues       Dead bugs       Pallof press  LE 2x10  UE 2x10      blue 10x10" ea LE 2x10  UE 2x10      blue 10x10" ea Dead bugs  2x10 ea    Dbl grn  10x10" ea Dead bugs  2x10 ea    Dbl grn  10x10" ea Dead bugs  2x10 ea    Dbl grn  10x10" ea   Lifts/chops with tband                                 Ther Ex        Nustep    LTR L3 10' no UE with lumbar roll    Standing repeated L S ext to tolerance AROM 2x10/3-5" throughout session with position changes L3 10' no UE with lumbar roll L4 10' no UE with lumbar roll L4 10' no UE with lumbar roll L4 10' no UE with lumbar roll   DKTC      Piriformis stretch Resume      resume       Hip flexor stretch         SLRs            Bridges Flex  2x10/3-5"  Abduction   2x10/3-5"ea      2x10/5"  Flex  2x10/3-5"  Abduction   2x10/3-5"ea      3x10/5"  Flex  2x10/3-5"        3x10/5"  Flex  3x10/3-5"  Abduction   3x10/3-5"ea      3x10/5" Flex  3x10/3-5"  Abduction   3x10/3-5"ea      3x10/5"   clamshells        Leg press      Step ups         Knee extension    HS curls 22# 2x15      22# 2x15 22# 2x15      22# 2x15 22# 2x15      22# 2x15 22# 2x15      22# 2x15 22# 2x15      22# 2x15           Ther Activity                        Gait Training                        Modalities prn

## 2021-09-08 NOTE — PROGRESS NOTES
PT Re-Evaluation  and PT Discharge    Today's date: 2021  Patient name: Jin Lay  : 1955  MRN: [id number]  Referring provider: Constance Avery MD  Dx:   Encounter Diagnosis     ICD-10-CM    1  Lumbar radiculopathy  M54 16        Start Time: 9980  Stop Time: 1640  Total time in clinic (min): 55 minutes    Assessment  Assessment details: Update - Pt's last attended session was on   Did not return to therapy after this date; did not have formal f/u /Re-eval with PT; unable to update pain, ROM , MMT, goals etc from last RE (included in this d/c summary)  Pt will be d/c from HEP 2* expiration of POC  HEP was reviewed with pt at last attended session  Janelle Polanco has been compliant with attending PT and completing home exercise program since initial eval and reports overall 30% improvement in pain/sx and function since start of care   Janelle Polanco reports and demonstrates decreased pain, increased strength, improved balance and improved overall level of function since initial eval, but is still limited compared to prior level of function  Janelle Polanco continues with above listed impairments and would benefit from additional skilled PT to address these deficits to return to prior level of function  Impairments: abnormal gait, abnormal muscle firing, abnormal or restricted ROM, activity intolerance, impaired physical strength, pain with function and poor posture     Symptom irritability: moderateUnderstanding of Dx/Px/POC: good   Prognosis: fair  Prognosis details: Chronic pain/sx , multiple failed prior treatments     Goals  STGs to be achieved in 4-6 weeks:    1  Pt will report reduced overall pain levels "at worst" by at least 2 points (0-10 scale) in order to allow improved tolerance for functional mobility and reduced reliance on medication or modalities for pain relief  - progressing/partially met   2   Pt will demonstrate improved AROM of L/S flexion by at least 5-10* in order to reduce pt difficulty with functional mobility and transfers  - met   3  Pt will demonstrate improved proximal hip and core strength by at least 1/2-1 MMT in order to improve lumbo-pelvic stability to reduce pain and improve function  - met R, progressing L   4  Pt will report overall improved tolerance for sustained mobility/activity by at least 25-50% since [facility] with overall reduced pain levels and reduced fear avoidance  - met     LTGs to be achieved in 8-12 weeks:    1  Pt will be independent with HEP, demonstrating proper technique with exercises and understanding of self progression of program without need for cueing or assistance  - progressing   2  Pt will report minimized pain levels with at least 25% reduction in pain since [facility]  - progressing   3  Pt will demonstrate WFL AROM and strength of B Hips grossly  - progressing   4  FOTO will reflect score at discharge that is greater than or equal to predicted level  - progressing          Plan  Planned therapy interventions: home exercise program        Subjective Evaluation    History of Present Illness  Mechanism of injury: Pt notes at least 30% improvement in pain/sx to date  Notes improving B LE strength  Notes his chronic baseline LBP is unchanged  Pain into his LEs "is a little better"  LE pain is not as intense and is not triggered as easily  Feels his balance is even better as well  Can tolerate more activity now before pain is worsened  Notes cont'd constant pain into R post/lateral LE to anterior medial R lower leg; constant/unchanged  Notes cont'd burning pain B anterior thighs to knees; improved  No new pain  No n/t or sensory changes  No new or progressive weakness  Cont worsening of sx 1* sitting /driving  RTD in about a month  No new sx/complaints             Recurrent probem    Quality of life: good    Pain  Current pain ratin  At best pain ratin  At worst pain ratin  Location: R L/S, R LE as noted above , L/R thigh   Quality: sharp, dull ache and burning  Progression: improved    Social Support  Steps to enter house: yes  Stairs in house: no   Lives in: [place] house  Lives with: spouse    Employment status: working (Paperwork)  Hand dominance: right    Treatments  Previous treatment: medication, injection treatment and chiropractic  Current treatment: injection treatment, medication and physical therapy  Patient Goals  Patient goals for therapy: decreased pain, increased motion and increased strength          Objective     Concurrent Complaints  Positive for night pain, disturbed sleep and history of cancer  Negative for bladder dysfunction, bowel dysfunction, saddle (S4) numbness, history of trauma and infection    Postural Observations  Seated posture: poor  Standing posture: fair  Correction of posture: has no consistent effect    Additional Postural Observation Details  Seated: Forward head/rounded shoulders  Increased thoracic kyphosis   B scapular depression and protraction with mild winging   Increased PPT in sitting    Standing:  Increased VALERI  Reduced lumbar lordosis         Tenderness     Lumbar Spine  No tenderness in the spinous process  Left Hip   No tenderness in the PSIS  Right Hip   No tenderness in the PSIS       Neurological Testing     Sensation     Lumbar   Left   Intact: light touch  Diminished: light touch    Right   Intact: light touch  Diminished: light touch    Reflexes   Left   Patellar (L4): normal (2+)  Achilles (S1): normal (2+)  Clonus sign: negative    Right   Patellar (L4): normal (2+)  Achilles (S1): normal (2+)  Clonus sign: negative    Additional Neurological Details  Notes diminished sensation B thighs to knees anteriorly - no change   Diminished sensation reported R vs L LE into R lower leg and R foot anterior/laterally - no change   Normal sensation reported posterior thighs/calves B   Notes this is chronic and unchanging   Will cont to monitor     Active Range of Motion     Lumbar   Flexion: 40 ("tight" B HS, pain L L/S/posterior hip ) degrees   Extension: Active lumbar extension: "feels better" in thighs  WFL  Left lateral flexion: Active left lumbar lateral flexion: Pain L lateral thigh at end range  with pain Restriction level: minimal  Right lateral flexion: Active right lumbar lateral flexion: pain in R lateral thigh at end range  with pain Restriction level: minimal  Left rotation: Active left lumbar rotation: "stiff" only  WFL  Right rotation: Active right lumbar rotation: no complaints  [facility]    Additional Active Range of Motion Details  No effect on LE sx with AROM L/S in standing  No effect on sx following AROM assessment  Limited L/S flexion mobility with L/S forward flexion - improved but cont to be limited by B HS tightness   Will cont to assess           Strength/Myotome Testing     Left Hip   Planes of Motion   Flexion: 4-  Abduction: 4  Adduction: 4+    Right Hip   Planes of Motion   Flexion: 4 (R L/S pain)  Abduction: 4+  Adduction: 4+    Left Knee   Flexion: 4  Extension: 4    Right Knee   Flexion: 4+ (Pain R L/S )  Extension: 4+ (Pain R L/S)    Left Ankle/Foot   Dorsiflexion: 4  Plantar flexion: 5    Right Ankle/Foot   Dorsiflexion: 4+  Plantar flexion: 5    Additional Strength Details  Discomfort R L/S with MMT screening seated EOT - cont   No distal sx   General proximal weakness noted B - improved R, no change L   Cont with LLE weakness vs R  Will cont to assess    Muscle Activation   Patient able to activate left transverse abdominals and right transverse abdominals  Additional Muscle Activation Details  Diminished activation of TA with transfers/mobility - improving with program/PT exercises/training     Tests     Lumbar     Left   Negative crossed SLR and passive SLR  Right   Negative crossed SLR and passive SLR  Left Hip   Negative REGAN and FADIR  Right Hip   Positive REGAN  Negative FADIR       Additional Tests Details  R/L SLR to <45* B with c/o HS pain/tightness; R improved to 50/60* with HS tightness at RE   Hip flexor tightness suggested by posture; will cont to assess   + R REGAN for local R L/S /posterior thigh discomfort at end range    Moderate hypomobility B hips - mild on R at RE   No effect on pain with R /L hip PROM assessment   Will cont to assess    Ambulation     Quality of Movement During Gait     Additional Quality of Movement During Gait Details  Slow hema  Flexed forward- improved   Shuffling gait- improved   Reduced trunk/arm swing       Flowsheet Rows      Most Recent Value   PT/OT G-Codes   Current Score  56   Projected Score  56             Precautions: CAD, FALLS, CA hx       Re-eval Date: 7/24    Date 7/14 7/19 7/21     Visit Count 6/8 7/8 8/8     FOTO 6/24       Pain In 5/10 5/10 6/10     Pain Out See IE  5/10 5/10           Manuals 7/14 7/19 7/21     Trial gentle CPA mobs L/S grades 1-3 as tolerated  Trial gentle  L/S soft tissue mvt  Tolerated; long axis distraction B LE and single 15' Trial gentle  L/S soft tissue mvt  Tolerated; long axis distraction B LE and single 15' Trial gentle  L/S soft tissue mvt  Tolerated; long axis distraction B LE and single 10'                             Neuro Re-Ed        TA supine      TA with bent knee fallouts        20x/3-5"       20x/3-5"       20x/3-5"                 TA with heel slides    2x10 ea alt  Cues      Dead bugs       Pallof press        grn 10x10" ea       grn 10x10" ea         Resume      Lifts/chops with tband                                 Ther Ex        Nustep    LTR L3 10' no UE with lumbar roll    Standing repeated L S ext to tolerance AROM 2x10/3-5" throughout session with position changes L3 10' no UE with lumbar roll L3 10' no UE with lumbar roll     DKTC      Piriformis stretch 10x10"      4x30"   10x10"      4x30" 10x10"      4x30"           Hip flexor stretch         SLRs            Bridges Flex  2x10/3-5" Flex  2x10/3-5" Flex  2x10/3-5"  Abduction   2x10/3-5"ea      2x10/5" [place]        Leg press      Step ups         Knee extension    HS curls 22# 2x10      22# 2x10 22# 3x10      22# 3x10 22# 3x10      22# 3x10             Ther Activity                        Gait Training                        Modalities prn

## 2021-11-02 ENCOUNTER — OFFICE VISIT (OUTPATIENT)
Dept: CARDIOLOGY CLINIC | Facility: CLINIC | Age: 66
End: 2021-11-02
Payer: COMMERCIAL

## 2021-11-02 VITALS
HEART RATE: 60 BPM | WEIGHT: 120 LBS | BODY MASS INDEX: 19.29 KG/M2 | DIASTOLIC BLOOD PRESSURE: 66 MMHG | SYSTOLIC BLOOD PRESSURE: 96 MMHG | HEIGHT: 66 IN

## 2021-11-02 DIAGNOSIS — E78.5 DYSLIPIDEMIA: ICD-10-CM

## 2021-11-02 DIAGNOSIS — I25.10 CORONARY ARTERY DISEASE INVOLVING NATIVE CORONARY ARTERY OF NATIVE HEART WITHOUT ANGINA PECTORIS: Primary | ICD-10-CM

## 2021-11-02 DIAGNOSIS — I95.0 IDIOPATHIC HYPOTENSION: ICD-10-CM

## 2021-11-02 PROCEDURE — 93000 ELECTROCARDIOGRAM COMPLETE: CPT | Performed by: INTERNAL MEDICINE

## 2021-11-02 PROCEDURE — 99214 OFFICE O/P EST MOD 30 MIN: CPT | Performed by: INTERNAL MEDICINE

## 2021-11-02 RX ORDER — GABAPENTIN 600 MG
TABLET ORAL
COMMUNITY
Start: 2021-10-12 | End: 2022-04-04

## 2021-11-02 RX ORDER — DULOXETIN HYDROCHLORIDE 60 MG/1
60 CAPSULE, DELAYED RELEASE ORAL DAILY
COMMUNITY
Start: 2021-09-21

## 2021-11-12 ENCOUNTER — OFFICE VISIT (OUTPATIENT)
Dept: UROLOGY | Facility: MEDICAL CENTER | Age: 66
End: 2021-11-12
Payer: COMMERCIAL

## 2021-11-12 VITALS
BODY MASS INDEX: 19.4 KG/M2 | DIASTOLIC BLOOD PRESSURE: 70 MMHG | SYSTOLIC BLOOD PRESSURE: 100 MMHG | HEART RATE: 72 BPM | WEIGHT: 120.2 LBS

## 2021-11-12 DIAGNOSIS — Z85.46 HISTORY OF PROSTATE CANCER: Primary | ICD-10-CM

## 2021-11-12 PROCEDURE — 99213 OFFICE O/P EST LOW 20 MIN: CPT | Performed by: UROLOGY

## 2021-11-26 ENCOUNTER — LAB (OUTPATIENT)
Dept: LAB | Facility: CLINIC | Age: 66
End: 2021-11-26
Payer: COMMERCIAL

## 2021-11-26 DIAGNOSIS — E78.5 HYPERLIPIDEMIA, UNSPECIFIED HYPERLIPIDEMIA TYPE: ICD-10-CM

## 2021-11-26 DIAGNOSIS — I10 HYPERTENSION, UNSPECIFIED TYPE: ICD-10-CM

## 2021-11-26 DIAGNOSIS — Z85.46 HISTORY OF PROSTATE CANCER: ICD-10-CM

## 2021-11-26 LAB
ALBUMIN SERPL BCP-MCNC: 4 G/DL (ref 3.5–5)
ALP SERPL-CCNC: 82 U/L (ref 46–116)
ALT SERPL W P-5'-P-CCNC: 19 U/L (ref 12–78)
ANION GAP SERPL CALCULATED.3IONS-SCNC: 3 MMOL/L (ref 4–13)
AST SERPL W P-5'-P-CCNC: 13 U/L (ref 5–45)
BASOPHILS # BLD AUTO: 0.08 THOUSANDS/ΜL (ref 0–0.1)
BASOPHILS NFR BLD AUTO: 1 % (ref 0–1)
BILIRUB SERPL-MCNC: 0.6 MG/DL (ref 0.2–1)
BUN SERPL-MCNC: 17 MG/DL (ref 5–25)
CALCIUM SERPL-MCNC: 10.1 MG/DL (ref 8.3–10.1)
CHLORIDE SERPL-SCNC: 105 MMOL/L (ref 100–108)
CHOLEST SERPL-MCNC: 131 MG/DL
CO2 SERPL-SCNC: 28 MMOL/L (ref 21–32)
CREAT SERPL-MCNC: 0.93 MG/DL (ref 0.6–1.3)
EOSINOPHIL # BLD AUTO: 0.33 THOUSAND/ΜL (ref 0–0.61)
EOSINOPHIL NFR BLD AUTO: 4 % (ref 0–6)
ERYTHROCYTE [DISTWIDTH] IN BLOOD BY AUTOMATED COUNT: 13.3 % (ref 11.6–15.1)
GFR SERPL CREATININE-BSD FRML MDRD: 85 ML/MIN/1.73SQ M
GLUCOSE P FAST SERPL-MCNC: 99 MG/DL (ref 65–99)
HCT VFR BLD AUTO: 45.9 % (ref 36.5–49.3)
HDLC SERPL-MCNC: 64 MG/DL
HGB BLD-MCNC: 15.1 G/DL (ref 12–17)
IMM GRANULOCYTES # BLD AUTO: 0.03 THOUSAND/UL (ref 0–0.2)
IMM GRANULOCYTES NFR BLD AUTO: 0 % (ref 0–2)
LDLC SERPL CALC-MCNC: 54 MG/DL (ref 0–100)
LYMPHOCYTES # BLD AUTO: 2.34 THOUSANDS/ΜL (ref 0.6–4.47)
LYMPHOCYTES NFR BLD AUTO: 26 % (ref 14–44)
MCH RBC QN AUTO: 30.9 PG (ref 26.8–34.3)
MCHC RBC AUTO-ENTMCNC: 32.9 G/DL (ref 31.4–37.4)
MCV RBC AUTO: 94 FL (ref 82–98)
MONOCYTES # BLD AUTO: 0.81 THOUSAND/ΜL (ref 0.17–1.22)
MONOCYTES NFR BLD AUTO: 9 % (ref 4–12)
NEUTROPHILS # BLD AUTO: 5.52 THOUSANDS/ΜL (ref 1.85–7.62)
NEUTS SEG NFR BLD AUTO: 60 % (ref 43–75)
NONHDLC SERPL-MCNC: 67 MG/DL
NRBC BLD AUTO-RTO: 0 /100 WBCS
PLATELET # BLD AUTO: 206 THOUSANDS/UL (ref 149–390)
PMV BLD AUTO: 11.2 FL (ref 8.9–12.7)
POTASSIUM SERPL-SCNC: 4.9 MMOL/L (ref 3.5–5.3)
PROT SERPL-MCNC: 7.4 G/DL (ref 6.4–8.2)
PSA SERPL-MCNC: <0.1 NG/ML (ref 0–4)
RBC # BLD AUTO: 4.89 MILLION/UL (ref 3.88–5.62)
SODIUM SERPL-SCNC: 136 MMOL/L (ref 136–145)
TRIGL SERPL-MCNC: 66 MG/DL
WBC # BLD AUTO: 9.11 THOUSAND/UL (ref 4.31–10.16)

## 2021-11-26 PROCEDURE — 80061 LIPID PANEL: CPT

## 2021-11-26 PROCEDURE — 84153 ASSAY OF PSA TOTAL: CPT

## 2021-11-26 PROCEDURE — 85025 COMPLETE CBC W/AUTO DIFF WBC: CPT

## 2021-11-26 PROCEDURE — 80053 COMPREHEN METABOLIC PANEL: CPT

## 2021-11-26 PROCEDURE — 36415 COLL VENOUS BLD VENIPUNCTURE: CPT

## 2021-12-17 ENCOUNTER — HOSPITAL ENCOUNTER (OUTPATIENT)
Dept: MRI IMAGING | Facility: HOSPITAL | Age: 66
Discharge: HOME | End: 2021-12-17
Payer: COMMERCIAL

## 2021-12-17 DIAGNOSIS — M54.50 LOW BACK PAIN, UNSPECIFIED: ICD-10-CM

## 2021-12-17 DIAGNOSIS — M96.1 POSTLAMINECTOMY SYNDROME, NOT ELSEWHERE CLASSIFIED: ICD-10-CM

## 2021-12-17 PROCEDURE — 72148 MRI LUMBAR SPINE W/O DYE: CPT

## 2021-12-17 PROCEDURE — A9585 GADOBUTROL INJECTION: HCPCS | Performed by: ANESTHESIOLOGY

## 2021-12-17 PROCEDURE — G1004 CDSM NDSC: HCPCS

## 2021-12-17 PROCEDURE — 72157 MRI CHEST SPINE W/O & W/DYE: CPT

## 2021-12-17 RX ADMIN — GADOBUTROL 6 ML: 604.72 INJECTION INTRAVENOUS at 11:57

## 2022-04-04 ENCOUNTER — CONSULT (OUTPATIENT)
Dept: PAIN MEDICINE | Facility: CLINIC | Age: 67
End: 2022-04-04
Payer: COMMERCIAL

## 2022-04-04 VITALS
SYSTOLIC BLOOD PRESSURE: 110 MMHG | DIASTOLIC BLOOD PRESSURE: 68 MMHG | HEIGHT: 66 IN | TEMPERATURE: 98.5 F | BODY MASS INDEX: 19.96 KG/M2 | OXYGEN SATURATION: 99 % | WEIGHT: 124.2 LBS | HEART RATE: 64 BPM

## 2022-04-04 DIAGNOSIS — G96.11 DURAL TEAR: ICD-10-CM

## 2022-04-04 DIAGNOSIS — M47.816 LUMBAR SPONDYLOSIS: ICD-10-CM

## 2022-04-04 DIAGNOSIS — Z79.891 LONG-TERM CURRENT USE OF OPIATE ANALGESIC: ICD-10-CM

## 2022-04-04 DIAGNOSIS — M54.9 MID BACK PAIN: ICD-10-CM

## 2022-04-04 DIAGNOSIS — F11.20 UNCOMPLICATED OPIOID DEPENDENCE (HCC): ICD-10-CM

## 2022-04-04 DIAGNOSIS — G89.4 CHRONIC PAIN SYNDROME: ICD-10-CM

## 2022-04-04 DIAGNOSIS — M51.16 LUMBAR DISC DISEASE WITH RADICULOPATHY: Primary | ICD-10-CM

## 2022-04-04 PROCEDURE — 99204 OFFICE O/P NEW MOD 45 MIN: CPT | Performed by: ANESTHESIOLOGY

## 2022-04-04 PROCEDURE — 80305 DRUG TEST PRSMV DIR OPT OBS: CPT | Performed by: ANESTHESIOLOGY

## 2022-04-04 RX ORDER — GABAPENTIN 600 MG/1
600 TABLET ORAL 2 TIMES DAILY
Qty: 60 TABLET | Refills: 0 | Status: SHIPPED | OUTPATIENT
Start: 2022-04-04 | End: 2022-05-06 | Stop reason: SDUPTHER

## 2022-04-04 RX ORDER — TRAMADOL HYDROCHLORIDE 50 MG/1
50 TABLET ORAL 3 TIMES DAILY PRN
Qty: 90 TABLET | Refills: 0 | Status: SHIPPED | OUTPATIENT
Start: 2022-04-04 | End: 2022-05-06 | Stop reason: SDUPTHER

## 2022-04-04 NOTE — PATIENT INSTRUCTIONS

## 2022-04-04 NOTE — PROGRESS NOTES
Assessment:  1  Lumbar disc disease with radiculopathy    2  Lumbar spondylosis    3  Mid back pain    4  Chronic pain syndrome    5  Uncomplicated opioid dependence ([address] )    6  Long-term current use of opiate analgesic        Plan:  Patient is a 68-year-old with a history significant for thoracic spinal stenosis with myelomalacia, dural cyst, lumbar radiculopathy, lumbar spondylosis presents to office for initial consultation  Patient was managed by Dr Marc Espino who performed lumbar epidural steroid injections and lumbar radiofrequency ablation  We are currently on hold for interventional management in regards to epidural injection secondary to turn the presence of a dural leak  Patient reports difficulty staying still secondary to radiating pain and pins and needles in the anterior compartments of bilateral thighs  1  We will provide patient with referral for neurosurgical evaluation regards to dural leak   2  We will refill tramadol 50 mg p o  t i d ; a urine drug screen and opioid contract was signed at this visit  3  We will refill gabapentin 600 mg p o  b i d   4  Follow-up in 1 month      There are risks associated with opioid medications, including dependence, addiction and tolerance  The patient understands and agrees to use these medications only as prescribed  Potential side effects of the medications include, but are not limited to, constipation, drowsiness, addiction, impaired judgment and risk of fatal overdose if not taken as prescribed  The patient was warned against driving while taking sedation medications  Sharing medications is a felony  At this point in time, the patient is showing no signs of addiction, abuse, diversion or suicidal ideation  A urine drug screen was collected at today's office visit as part of our medication management protocol  The point of care testing results were appropriate for what was being prescribed  The specimen will be sent for confirmatory testing   The drug screen is medically necessary because the patient is either dependent on opioid medication or is being considered for opioid medication therapy and the results could impact ongoing or future treatment  The drug screen is to evaluate for the presences or absence of prescribed, non-prescribed, and/or illicit drugs/substances  [place] Prescription Drug Monitoring Program report was reviewed and was appropriate     History of Present Illness: The patient is a 77 y o  male who presents for consultation in regards to Hip Pain, Leg Pain, Knee Pain, and Foot Pain  Symptoms have been present for 10 years  Symptoms began without any precipitating injury or trauma  Pain is reported to be 8 on the numeric rating scale  Symptoms are felt constantly and worst in the nighttime  Symptoms are characterized as burning, dull/aching, numbing and tingling  Symptoms are associated with right leg weakness  Aggravating factors include lying down, standing and sitting  Relieving factors include relaxation  No change in symptoms with kneeling, standing, bending, leaning forward, leaning bckward, walking, exercise, turning the head, relaxation and coughing/sneezing  Treatments that have been helpful include prior injections including Lumbar epidural steroid injection, lumbar radiofrequency ablation, physical therapy, chiropractic manipulation, TENS unit and heat/ice  Medications to relieve symptoms include tramadol  Review of Systems:    Review of Systems   Constitutional: Negative for fever and unexpected weight change  HENT: Negative for trouble swallowing  Eyes: Negative for visual disturbance  Respiratory: Negative for shortness of breath and wheezing  Cardiovascular: Negative for chest pain and palpitations  Gastrointestinal: Negative for constipation, diarrhea, nausea and vomiting  Endocrine: Negative for cold intolerance, heat intolerance and polydipsia     Genitourinary: Negative for difficulty urinating and frequency  Musculoskeletal: Positive for myalgias  Negative for arthralgias, gait problem and joint swelling  Skin: Negative for rash  Neurological: Negative for dizziness, seizures, syncope, weakness and headaches  Hematological: Does not bruise/bleed easily  Psychiatric/Behavioral: Negative for dysphoric mood  All other systems reviewed and are negative          Past Medical History:   Diagnosis Date    BCC (basal cell carcinoma)     in past left side of nose    Chronic pain disorder     low back and down rle    Coronary artery disease     cabg x 1 2006    Hyperlipidemia     Malignant neoplasm prostate ([facility] [address] )        Past Surgical History:   Procedure Laterality Date    COLONOSCOPY      CORONARY ARTERY BYPASS GRAFT  07/10/2006    LIMA to the diagonal artery    INGUINAL HERNIA REPAIR Right 1996    IL LAP,PROSTATECTOMY,RADICAL,W/NERVE SPARE,INCL ROBOTIC N/A 4/22/2019    Procedure: PROSTATECTOMY RADICAL W/ROBOTICS, B/L PELVIC NODE DISSECT;  Surgeon: Rolanda Roberson MD;  Location: [facility];  Service: Urology    SKIN CANCER EXCISION  2016    left side nose    US GUIDED PROSTATE BIOPSY  2/28/2019       Family History   Problem Relation Age of Onset    Cancer Father         bladder    Lung cancer Father         metastatic       Social History     Occupational History    Not on file   Tobacco Use    Smoking status: Current Every Day Smoker     Packs/day: 0 50     Years: 30 00     Pack years: 15 00     Types: Cigarettes    Smokeless tobacco: Never Used   Substance and Sexual Activity    Alcohol use: Not Currently    Drug use: Never    Sexual activity: Not on file         Current Outpatient Medications:     acetaminophen (TYLENOL) 500 mg tablet, Take 500 mg by mouth Three times a day, Disp: , Rfl:     aspirin (ECOTRIN LOW STRENGTH) 81 mg EC tablet, Take 81 mg by mouth daily, Disp: , Rfl:     docusate sodium (COLACE) 100 mg capsule, Take 1 capsule (100 mg total) by mouth 2 (two) times a day (Patient taking differently: Take 100 mg by mouth daily ), Disp: 30 capsule, Rfl: 0    DULoxetine (CYMBALTA) 60 mg delayed release capsule, Take 60 mg by mouth daily, Disp: , Rfl:     gabapentin (NEURONTIN) 600 MG tablet, Take 600 mg by mouth 2 (two) times a day , Disp: , Rfl:     methocarbamol (ROBAXIN) 500 mg tablet, Take 500 mg by mouth 2 (two) times a day , Disp: , Rfl:     Neurontin 600 MG tablet, TAKE 1 TABLET BY MOUTH 2 TIMES DAILY  ONCE IN THE MORNING AND ONCE AT BEDTIME, Disp: , Rfl:     rosuvastatin (CRESTOR) 10 MG tablet, Take 10 mg by mouth daily, Disp: , Rfl:     traMADol (ULTRAM) 50 mg tablet, Take 50 mg by mouth 3 (three) times a day as needed, Disp: , Rfl: 0    Allergies   Allergen Reactions    Atorvastatin        Physical Exam:    /68   Pulse 64   Temp 98 5 °F (36 9 °C)   Ht 5' 6" (1 676 m)   Wt 56 3 kg (124 lb 3 2 oz)   SpO2 99%   BMI 20 05 kg/m²     Constitutional: normal, well developed, well nourished, alert, in no distress and non-toxic and no overt pain behavior  Eyes: anicteric  HEENT: grossly intact  Neck: supple, symmetric, trachea midline and no masses   Pulmonary:even and unlabored  Cardiovascular:No edema or pitting edema present  Skin:Normal without rashes or lesions and well hydrated  Psychiatric:Mood and affect appropriate  Neurologic:Cranial Nerves II-XII grossly intact  Musculoskeletal:normal     Lumbar/Sacral Spine examination demonstrates  Full range of motion lumbar spine with pain upon: flexion, lateral rotation to the left/right, and bending to the left/right  Bilateral lumbar paraspinals tender to palpation  Muscle spasms noted in the lumbar area bilaterally  4/5 lower extremity strength in all muscle groups bilaterally  Positive seated straight leg raise for bilateral lower extremities  Sensitivity to light touch intact bilateral lower extremities  2+ reflexes in the patella and Achilles    No ankle clonus     Imaging  MRI THORACIC SPINE WITH AND WITHOUT CONTRAST     INDICATION: M96 1: Postlaminectomy syndrome, not elsewhere classified      COMPARISON:  MRI lumbar spine without contrast December 17, 2021  CT lung screening program July 7, 2021      TECHNIQUE:  Sagittal T1, sagittal T2, sagittal inversion recovery, axial T2,  axial 2D MERGE  Sagittal and axial T1 postcontrast      IV Contrast:  6 mL of Gadobutrol injection (SINGLE-DOSE)      IMAGE QUALITY:  Diagnostic      FINDINGS:     Postsurgical laminectomy changes at T2 and T3 level      ALIGNMENT:  Normal alignment of the thoracic spine  No compression fracture  No subluxation  No evidence of scoliosis      MARROW SIGNAL:  Small atypical hemangioma at T9 vertebral body  No pathologic marrow replacing lesion      THORACIC CORD/SPINAL CANAL:  Short-segment myelomalacia at T2-T3 spinal cord level with associated cord atrophy  No other signal abnormality in thoracic spinal cord  No abnormal enhancement      Long-segment anterior epidural space CSF-fluid collection spanning from C5 level to inferior T12 level which measures 0 8 cm in greatest AP dimension at T3 level (5:9)  There is dramatic conspicuity of the anterior epidural ligaments (midline and lateral   anterior epidural ligaments) Diffuse mild canal stenosis throughout the thoracic spine secondary to this large anterior epidural fluid collection  Scattered gradient signal blooming at anterior aspect of the thecal sac at T2-T3 level, which may be due   to blood products or prior postsurgical change  Some turbulent CSF flow is noted at the anterior epidural space at approximately T9-T10 level      PREVERTEBRAL AND PARASPINAL SOFT TISSUES:   Normal      THORACIC DEGENERATIVE CHANGE:  Multilevel degenerative changes consists of osteophytes and mild disc height loss    Diffuse mild canal stenosis throughout the thoracic spinal cord, worse at T3 level      POSTCONTRAST:  No abnormal enhancement      OTHER: Poststernotomy      IMPRESSION:     Probable short-segment myelomalacia at T2-T3 spinal cord level with associated cord atrophy likely related to prior surgery at this level status post T2-T3 laminectomies  No abnormal enhancement      Long-segment anterior epidural space CSF-fluid collection spanning from C5 to inferior T12 level resulting in diffuse mild canal stenosis throughout the thoracic spine, worse at T3 level  Question chronic dural tear leading to a communication between   subdural and epidural space      The study was marked in EPIC for significant notification  MRI LUMBAR SPINE WITHOUT CONTRAST     INDICATION: M54 50: Low back pain, unspecified      COMPARISON:  MRI thoracic spine with and without contrast December 17, 2021  MRI lumbar spine without contrast 2/4/2019      TECHNIQUE:  Sagittal T1, sagittal T2, sagittal inversion recovery, axial T1 and axial T2, coronal T2     IMAGE QUALITY:  Diagnostic     FINDINGS:     VERTEBRAL BODIES:  There is a transitional lumbosacral junction - partially sacralized L5 vertebral body with bilateral assimilation joints  Mild levoscoliosis of lumbar spine  No spondylolysis or spondylolisthesis  No compression fracture         Normal marrow signal is identified within the visualized bony structures  No discrete marrow lesion      SACRUM:  Normal signal within the sacrum  No evidence of insufficiency or stress fracture      DISTAL CORD AND CONUS:  Normal size and signal within the distal cord and conus      PARASPINAL SOFT TISSUES:  Paraspinal soft tissues are unremarkable      LOWER THORACIC DISC SPACES:  Mild noncompressive lower thoracic degenerative change      LUMBAR DISC SPACES:  Multilevel osteophytes, disc height loss, and facet arthropathy      L1-L2: Normal      L2-L3: Diffuse disc bulge and small central posterior annular fissure  Facet arthropathy    No significant canal stenosis or foraminal narrowing      L3-L4: Diffuse disc bulge and small central posterior annular fissure  Facet arthropathy  No significant canal stenosis or foraminal narrowing      L4-L5: Diffuse disc bulge and small left foraminal disc protrusion  Hypertrophic facet arthropathy, ligamentum flavum thickening, trace facet joint effusions bilaterally  Mild canal stenosis, slightly worse  Mild-to-moderate right and mild left   foraminal narrowing, slightly worse from prior exam      L5-S1: Facet arthropathy  No significant canal stenosis or foraminal narrowing      IMPRESSION:     Slightly worsened multilevel degenerative changes of lumbar spine with transitional lumbosacral anatomy (partially sacralized L5 vertebral body), varying degrees of canal stenosis (mild L4-L5) and foraminal narrowing (mild-to-moderate right L4-L5; mild   left L4-L5), as detailed above

## 2022-04-28 ENCOUNTER — CONSULT (OUTPATIENT)
Dept: NEUROSURGERY | Facility: CLINIC | Age: 67
End: 2022-04-28
Payer: COMMERCIAL

## 2022-04-28 VITALS
WEIGHT: 123.9 LBS | BODY MASS INDEX: 19.91 KG/M2 | HEIGHT: 66 IN | DIASTOLIC BLOOD PRESSURE: 56 MMHG | RESPIRATION RATE: 16 BRPM | HEART RATE: 88 BPM | SYSTOLIC BLOOD PRESSURE: 103 MMHG | TEMPERATURE: 98 F

## 2022-04-28 DIAGNOSIS — M54.9 MID BACK PAIN: ICD-10-CM

## 2022-04-28 DIAGNOSIS — G96.11 DURAL TEAR: ICD-10-CM

## 2022-04-28 DIAGNOSIS — G89.4 CHRONIC PAIN SYNDROME: ICD-10-CM

## 2022-04-28 PROCEDURE — 99203 OFFICE O/P NEW LOW 30 MIN: CPT | Performed by: NEUROLOGICAL SURGERY

## 2022-04-28 RX ORDER — IBUPROFEN 200 MG
TABLET ORAL EVERY 6 HOURS PRN
COMMUNITY

## 2022-04-28 NOTE — PROGRESS NOTES
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 Ambulatory referral to Neurosurgery          Chief Complaint   Patient presents with    Consult     NP referred by Norma Sinclair MD for Mid back pain, S/p (Erminia Kocher, MD) T1-T3 LAMINECTOMY WITH DECOMPRESSION [2020]; MRI L/T Spine 21 SL       HPIPatient has a complicated history involving a thoracic surgery and a decompression which went well and improved his symptoms at the time  This is a follow up visit for that surgery - the provider has   His primary complaint is that of midback pain and pain in his hips and knees  The pain is controlled with medications which he continues to take including Gabapentin and tramadol  He does also have LBP which has been present for these last 15 years      Review of Systems   Constitutional: Positive for activity change (decreased)  Negative for appetite change  HENT: Positive for tinnitus  Eyes: Negative  Respiratory: Negative  Cardiovascular: Negative  Negative for leg swelling  Gastrointestinal: Negative  Negative for constipation, nausea and vomiting  Endocrine: Negative  Genitourinary: Negative  Negative for frequency and urgency  Musculoskeletal: Positive for back pain (constant right lower back pain radiates across the buttock into right lateral leg and right foot ), gait problem (off balance after T-Spine Sx) and myalgias (in b/l legs)  Negative for arthralgias and joint swelling  Lower back pain started 15 years ago   Hx of falling on ice, no other explanation at this time  Pain in B/l hips and knees started after Back Sx with [facility]H in 2020  Gabapentin and tramadol provides relief in lower back  Nothing calms the pain between the b/l hips and b/l knees  PT - Yes, 8 weeks - slightly helpful  Chiro - temporary relief (stens treatment)  INJ - Yes for 15 years - not sure if this was helpful  Ablations - helpful    Previous T-Spine Sx was very beneficial, restored right foot numbness and right foot drop  No falls   Skin: Negative  Allergic/Immunologic: Negative  Neurological: Positive for weakness (b/l legs) and numbness (and tingling (described as a burning sensation) b/l hips and b/l knees  N/t in b/l feet R>L)  Negative for dizziness and headaches  Hematological: Negative  Does not bruise/bleed easily  Psychiatric/Behavioral: Positive for sleep disturbance  I reviewed the ROS        Vitals:    /56 (BP Location: Right arm, Patient Position: Sitting, Cuff Size: Standard)   Pulse 88   Temp 98 °F (36 7 °C) (Probe)   Resp 16   Ht 5' 6" (1 676 m)   Wt 56 2 kg (123 lb 14 4 oz)   BMI 20 00 kg/m²       MEDICAL HISTORY  Past Medical History:   Diagnosis Date    BCC (basal cell carcinoma)     in past left side of nose    Chronic pain disorder     low back and down rle    Coronary artery disease     cabg x 1 2006    Hyperlipidemia     Malignant neoplasm prostate [facility])      Past Surgical History:   Procedure Laterality Date    COLONOSCOPY      CORONARY ARTERY BYPASS GRAFT  07/10/2006    LIMA to the diagonal artery    INGUINAL HERNIA REPAIR Right 1996    RI LAP,PROSTATECTOMY,RADICAL,W/NERVE SPARE,INCL ROBOTIC N/A 4/22/2019    Procedure: PROSTATECTOMY RADICAL W/ROBOTICS, B/L PELVIC NODE DISSECT;  Surgeon: Jazmin Ortega MD;  Location: [facility];  Service: Urology    SKIN CANCER EXCISION  2016    left side nose    THORACIC SPINE SURGERY      T3-T4    US GUIDED PROSTATE BIOPSY  2/28/2019     Social History     Tobacco Use  Smoking status: Current Every Day Smoker     Packs/day: 0 50     Years: 30 00     Pack years: 15 00     Types: Cigarettes    Smokeless tobacco: Never Used   Substance Use Topics    Alcohol use: Not Currently    Drug use: Never        Current Outpatient Medications:     acetaminophen (TYLENOL) 500 mg tablet, Take 500 mg by mouth Three times a day, Disp: , Rfl:     aspirin (ECOTRIN LOW STRENGTH) 81 mg EC tablet, Take 81 mg by mouth daily, Disp: , Rfl:     docusate sodium (COLACE) 100 mg capsule, Take 1 capsule (100 mg total) by mouth 2 (two) times a day (Patient taking differently: Take 100 mg by mouth daily ), Disp: 30 capsule, Rfl: 0    DULoxetine (CYMBALTA) 60 mg delayed release capsule, Take 60 mg by mouth daily, Disp: , Rfl:     gabapentin (NEURONTIN) 600 MG tablet, Take 1 tablet (600 mg total) by mouth 2 (two) times a day, Disp: 60 tablet, Rfl: 0    ibuprofen (MOTRIN) 200 mg tablet, Take by mouth every 6 (six) hours as needed for mild pain, Disp: , Rfl:     methocarbamol (ROBAXIN) 500 mg tablet, Take 500 mg by mouth 2 (two) times a day , Disp: , Rfl:     rosuvastatin (CRESTOR) 10 MG tablet, Take 10 mg by mouth daily, Disp: , Rfl:     traMADol (ULTRAM) 50 mg tablet, Take 1 tablet (50 mg total) by mouth 3 (three) times a day as needed for moderate pain, Disp: 90 tablet, Rfl: 0   Allergies   Allergen Reactions    Atorvastatin         The following portions of the patient's history were updated by MA and reviewed by MD: allergies, current medications, past family history, past medical history, past social history, past surgical history and problem list       Physical Exam  Vitals and nursing note reviewed  Constitutional:       General: He is not in acute distress  Appearance: Normal appearance  He is normal weight  He is not ill-appearing, toxic-appearing or diaphoretic  HENT:      Head: Normocephalic and atraumatic        Nose: Nose normal    Eyes:      Extraocular Movements: Extraocular movements intact  Pupils: Pupils are equal, round, and reactive to light  Musculoskeletal:         General: No swelling, tenderness, deformity or signs of injury  Normal range of motion  Cervical back: Normal range of motion and neck supple  Right lower leg: No edema  Left lower leg: No edema  Skin:     General: Skin is warm and dry  Neurological:      General: No focal deficit present  Mental Status: He is alert and oriented to person, place, and time  Mental status is at baseline  Cranial Nerves: No cranial nerve deficit  Sensory: No sensory deficit  Motor: No weakness  Coordination: Coordination normal       Gait: Gait abnormal ( sl wide based he cannot perform tandem gait)  Deep Tendon Reflexes: Reflexes abnormal (hyperreflexive without clonus)  Psychiatric:         Mood and Affect: Mood normal          Behavior: Behavior normal          Thought Content: Thought content normal          Judgment: Judgment normal            RESULTS/DATA  I have personally reviewed pertinent films in PACS   MRI of the thoracic and lumbar spine are carefully reviewed  There is a 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

## 2022-05-06 ENCOUNTER — OFFICE VISIT (OUTPATIENT)
Dept: PAIN MEDICINE | Facility: CLINIC | Age: 67
End: 2022-05-06
Payer: COMMERCIAL

## 2022-05-06 VITALS
BODY MASS INDEX: 19.96 KG/M2 | HEIGHT: 66 IN | TEMPERATURE: 98.5 F | SYSTOLIC BLOOD PRESSURE: 106 MMHG | DIASTOLIC BLOOD PRESSURE: 70 MMHG | WEIGHT: 124.2 LBS

## 2022-05-06 DIAGNOSIS — G89.4 CHRONIC PAIN SYNDROME: Primary | ICD-10-CM

## 2022-05-06 DIAGNOSIS — M54.9 MID BACK PAIN: ICD-10-CM

## 2022-05-06 DIAGNOSIS — M47.816 LUMBAR SPONDYLOSIS: ICD-10-CM

## 2022-05-06 DIAGNOSIS — F11.20 UNCOMPLICATED OPIOID DEPENDENCE (HCC): ICD-10-CM

## 2022-05-06 DIAGNOSIS — G96.11 DURAL TEAR: ICD-10-CM

## 2022-05-06 DIAGNOSIS — Z79.891 LONG-TERM CURRENT USE OF OPIATE ANALGESIC: ICD-10-CM

## 2022-05-06 DIAGNOSIS — M51.16 LUMBAR DISC DISEASE WITH RADICULOPATHY: ICD-10-CM

## 2022-05-06 PROCEDURE — 80305 DRUG TEST PRSMV DIR OPT OBS: CPT | Performed by: NURSE PRACTITIONER

## 2022-05-06 PROCEDURE — 99214 OFFICE O/P EST MOD 30 MIN: CPT | Performed by: NURSE PRACTITIONER

## 2022-05-06 RX ORDER — TRAMADOL HYDROCHLORIDE 50 MG/1
50 TABLET ORAL 3 TIMES DAILY PRN
Qty: 90 TABLET | Refills: 1 | Status: SHIPPED | OUTPATIENT
Start: 2022-05-06 | End: 2022-06-05

## 2022-05-06 RX ORDER — GABAPENTIN 600 MG/1
600 TABLET ORAL 2 TIMES DAILY
Qty: 60 TABLET | Refills: 1 | Status: SHIPPED | OUTPATIENT
Start: 2022-05-06 | End: 2022-07-08 | Stop reason: SDUPTHER

## 2022-05-06 NOTE — PATIENT INSTRUCTIONS
Opioid Safety   WHAT YOU NEED TO KNOW:   An opioid medicine is used to treat pain  Examples are oxycodone, morphine, fentanyl, or codeine  Pain control and management may help you rest, heal, and return to your daily activities  You and your family will receive information about how to manage your pain at home  The instructions will include what to do if you have side effects as your pain is managed  You will get information on how to handle opioid medicine safely  You will also get suggestions on how to control pain without opioids  It is important to follow all instructions so your pain is managed effectively  DISCHARGE INSTRUCTIONS:   Call your local emergency number (911 in the [place]), or have someone else call if:   · You have a seizure  · You cannot be woken  · You have trouble staying awake and your breathing is slow or shallow  · Your speech is slurred, or you are confused  · You are dizzy or stumble when you walk  Call your doctor, or have someone close to you call if:   · You are extremely drowsy, or you have trouble staying awake or speaking  · You have pale or clammy skin  · You have blue fingernails or lips  · Your heartbeat is slower than normal     · You cannot stop vomiting  · You have questions or concerns about your condition or care  Use opioids safely:   · Take prescribed opioids exactly as directed  Opioids come with directions based on the kind and how it is given  Talk to your healthcare provider or a pharmacist if you have any questions  Do not take more than the recommended amount  Too much can cause a life-threatening overdose  Do not continue to take it after your pain stops  You may develop tolerance  This means you keep needing higher doses to get the same effect  You may also develop opioid use disorder  This means you are not able to control your opioid use  · Do not give opioids to others or take opioids that belong to someone else    The kind or amount one person takes may not be right for another  The person you share them with may also be taking medicines that do not mix with opioids  He or she may drink alcohol or use other drugs that can cause life-threatening problems when mixed with opioids  · Do not mix opioids with other medicines or alcohol  The combination can cause an overdose, or cause you to stop breathing  Alcohol, sleeping pills, and medicines such as antihistamines can make you sleepy  A combination with opioids can lead to a coma  · Do not drive or operate heavy machinery after you use an opioid  You may feel drowsy or have trouble concentrating  You can injure yourself or others if you drive or use heavy machinery when you are not alert  Your provider or pharmacist can tell you how long to wait after a dose before you do these activities  · Talk to your healthcare provider if you have any side effects  Side effects include nausea, sleepiness, itching, and trouble thinking clearly  Your provider may need to make changes to the kind or amount of opioid you are taking  He or she can also help you find ways to prevent or relieve side effects  Manage constipation:  Constipation is the most common side effect of opioid medicine  Constipation is when you have hard, dry bowel movements, or you go longer than usual between bowel movements  Tell your healthcare provider about all changes in your bowel movements while you are taking opioids  He or she may recommend laxative medicine to help you have a bowel movement  He or she may also change the kind of opioid you are taking, or change when you take it  The following are more ways you can prevent or relieve constipation:  · Drink liquids as directed  You may need to drink extra liquids to help soften and move your bowels  Ask how much liquid to drink each day and which liquids are best for you  · Eat high-fiber foods    This may help decrease constipation by adding bulk to your bowel movements  High-fiber foods include fruits, vegetables, whole-grain breads and cereals, and beans  Your healthcare provider or dietitian can help you create a high-fiber meal plan  Your provider may also recommend a fiber supplement if you cannot get enough fiber from food  · Exercise regularly  Regular physical activity can help stimulate your intestines  Walking is a good exercise to prevent or relieve constipation  Ask which exercises are best for you  · Schedule a time each day to have a bowel movement  This may help train your body to have regular bowel movements  Bend forward while you are on the toilet to help move the bowel movement out  Sit on the toilet for at least 10 minutes, even if you do not have a bowel movement  Store opioids safely:   · Store opioids where others cannot easily get them  Keep them in a locked cabinet or secure area  Do not  keep them in a purse or other bag you carry with you  A person may be looking for something else and find the opioids  · Make sure opioids are stored out of the reach of children  A child can easily overdose on opioids  Opioids may look like candy to a small child  The best way to dispose of opioids: The laws vary by country and area  In the [place], the best way is to return the opioids through a take-back program  This program is offered by the [employer]  The following are options for using the program:  · Take the opioids to a WANG collection site  The site is often a law enforcement center  Call your local law enforcement center for scheduled take-back days in your area  You will be given information on where to go if the collection site is in a different location  · Take the opioids to an approved pharmacy or hospital   A pharmacy or hospital may be set up as a collection site  You will need to ask if it is a WANG collection site if you were not directed there   A pharmacy or doctor's office may not be able to take back opioids unless it is a WANG site  · Use a mail-back system  This means you are given containers to put the opioids into  You will then mail them in the containers  · Use a take-back drop box  This is a place to leave the opioids at any time  People and animals will not be able to get into the box  Your local law enforcement agency can tell you where to find a drop box in your area  Other safe ways to dispose of opioids: The medicine may come with disposal instructions  The instructions may vary depending on the brand of medicine you are using  Instructions may come in a Medication Guide, but not every medicine has one  You may instead get instructions from your pharmacy or doctor  Follow instructions carefully  The following are general guidelines to follow:  · Find out if you can flush the opioid  Some opioids can be flushed down the toilet or poured into the sink  You will need to contact authorities in your area to see if this is an option for you  The FDA also offers a list of medicines that are safe to flush down the toilet  You can check the list if you cannot get the information for your local area  · Ask your waste management company about rules for putting opioids in the trash  The company will be able to give you specific directions  Scratch out personal information on the original medicine label so it cannot be read  Then put it in the trash  Do not label the trash or put any information on it about the opioids  It should look like regular household trash so no one is tempted to look for the opioids  Keep the trash out of the reach of children and animals  Always make sure trash is secure  · Talk to officials if you live in a facility  If you live in a nursing home or assisted living center, talk to an official  The person will know the rules for your area  Other ways to manage pain:   · Ask your healthcare provider about non-opioid medicines to control pain  Some medicines may even work better than opioids, depending on the cause of your pain  Nonprescription medicines include NSAIDs (such as ibuprofen) and acetaminophen  Prescription medicines include muscle relaxers, antidepressants, and steroids  · Pain may be managed without any medicines  Some ways to relieve pain include massage, aromatherapy, or meditation  Physical or occupational therapy may also help  For more information:   · Drug Enforcement Administration  [address] [facility] [address]  Phone: 3- 997 - 057-7650  Web Address: [facility]/drug_disposal/    · [address] and Drug Administration  [place] , [address]  Phone: 9- 727 - 799-7868  Web Address: http://[employer]/    Follow up with your doctor or pain specialist as directed: You may need to have your dose adjusted  Your doctor or pain specialist can also help you find ways to manage pain without opioids  Write down your questions so you remember to ask them during your visits  © Copyright [employer] 2022 Information is for End User's use only and may not be sold, redistributed or otherwise used for commercial purposes  All illustrations and images included in CareNotes® are the copyrighted property of [employer]  or Marilyn Byers  The above information is an  only  It is not intended as medical advice for individual conditions or treatments  Talk to your doctor, nurse or pharmacist before following any medical regimen to see if it is safe and effective for you

## 2022-05-06 NOTE — PROGRESS NOTES
Assessment:  1  Chronic pain syndrome    2  Lumbar disc disease with radiculopathy    3  Lumbar spondylosis    4  Mid back pain    5  Dural tear    6  Uncomplicated opioid dependence ([address] )    7  Long-term current use of opiate analgesic        Plan:  While the patient was in the office today, I did have a thorough conversation regarding their chronic pain syndrome, medication management, and treatment plan options  Patient is being seen for follow-up visit  He was initially seen here for consultation on 04/04/2022  He was referred for neurosurgical evaluation given history of dural leak  He was evaluated neurosurgically on 04/28/2022  Conservative management was recommended  Repeat MRI of the thoracic spine in 1 year was recommended  Will consider repeat lumbar facet radiofrequency ablations p r n  Continue tramadol 50 milligrams 3 times daily if needed for pain  A prescription was sent to his pharmacy with 1 refill  Continue gabapentin 600 milligrams twice daily to address the neuropathic component of his pain  Prescription was sent to his pharmacy  The patient will follow-up in 1 month for medication prescription refill and reevaluation  The patient was advised to contact the office should their symptoms worsen in the interim  The patient was agreeable and verbalized an understanding  There are risks associated with opioid medications, including dependence, addiction and tolerance  The patient understands and agrees to use these medications only as prescribed  Potential side effects of the medications include, but are not limited to, constipation, drowsiness, addiction, impaired judgment and risk of fatal overdose if not taken as prescribed  The patient was warned against driving while taking sedation medications  Sharing medications is a felony  At this point in time, the patient is showing no signs of addiction, abuse, diversion or suicidal ideation      A urine drug screen was collected at today's office visit as part of our medication management protocol  The point of care testing results were appropriate for what was being prescribed  The specimen will be sent for confirmatory testing  The drug screen is medically necessary because the patient is either dependent on opioid medication or is being considered for opioid medication therapy and the results could impact ongoing or future treatment  The drug screen is to evaluate for the presences or absence of prescribed, non-prescribed, and/or illicit drugs/substances  [place] Prescription Drug Monitoring Program report was reviewed and was appropriate       History of Present Illness: The patient is a 77 y o  male who presents for a follow up office visit in regards to Back Pain, Leg Pain, Knee Pain, Foot Pain, and Hip Pain  The patients current symptoms include complaints of low back and bilateral leg pain  Current pain level is an 8/10  Quality of pain is described as burning, dull, aching, numb  Current pain medications includes:  Tramadol 50 milligrams 3 times daily if needed for spasms, gabapentin 600 milligrams twice daily    The patient reports that this regimen is providing 30 % pain relief  The patient is reporting no side effects from this pain medication regimen  I have personally reviewed and/or updated the patient's past medical history, past surgical history, family history, social history, current medications, allergies, and vital signs today  Review of Systems  Review of Systems   Respiratory: Negative for shortness of breath  Cardiovascular: Negative for chest pain  Gastrointestinal: Negative for constipation, diarrhea, nausea and vomiting  Musculoskeletal: Positive for arthralgias and myalgias  Negative for gait problem and joint swelling  Pain in extremity - right leg   Neurological: Negative for dizziness, seizures and weakness  All other systems reviewed and are negative          Past Medical History:   Diagnosis Date    BCC (basal cell carcinoma)     in past left side of nose    Chronic pain disorder     low back and down rle    Coronary artery disease     cabg x 1 2006    Hyperlipidemia     Malignant neoplasm prostate [facility])        Past Surgical History:   Procedure Laterality Date    COLONOSCOPY      CORONARY ARTERY BYPASS GRAFT  07/10/2006    LIMA to the diagonal artery    INGUINAL HERNIA REPAIR Right 1996    ND LAP,PROSTATECTOMY,RADICAL,W/NERVE SPARE,INCL ROBOTIC N/A 4/22/2019    Procedure: PROSTATECTOMY RADICAL W/ROBOTICS, B/L PELVIC NODE DISSECT;  Surgeon: Jayjay Torres MD;  Location: [facility];  Service: Urology    SKIN CANCER EXCISION  2016    left side nose    THORACIC SPINE SURGERY      T3-T4    US GUIDED PROSTATE BIOPSY  2/28/2019       Family History   Problem Relation Age of Onset    Cancer Father         bladder    Lung cancer Father         metastatic       Social History     Occupational History    Not on file   Tobacco Use    Smoking status: Current Every Day Smoker     Packs/day: 0 50     Years: 30 00     Pack years: 15 00     Types: Cigarettes    Smokeless tobacco: Never Used   Substance and Sexual Activity    Alcohol use: Not Currently    Drug use: Never    Sexual activity: Not on file         Current Outpatient Medications:     acetaminophen (TYLENOL) 500 mg tablet, Take 500 mg by mouth Three times a day, Disp: , Rfl:     aspirin (ECOTRIN LOW STRENGTH) 81 mg EC tablet, Take 81 mg by mouth daily, Disp: , Rfl:     docusate sodium (COLACE) 100 mg capsule, Take 1 capsule (100 mg total) by mouth 2 (two) times a day (Patient taking differently: Take 100 mg by mouth daily ), Disp: 30 capsule, Rfl: 0    DULoxetine (CYMBALTA) 60 mg delayed release capsule, Take 60 mg by mouth daily, Disp: , Rfl:     gabapentin (NEURONTIN) 600 MG tablet, Take 1 tablet (600 mg total) by mouth 2 (two) times a day, Disp: 60 tablet, Rfl: 1    ibuprofen (MOTRIN) 200 mg tablet, Take by mouth every 6 (six) hours as needed for mild pain, Disp: , Rfl:     methocarbamol (ROBAXIN) 500 mg tablet, Take 500 mg by mouth 2 (two) times a day , Disp: , Rfl:     rosuvastatin (CRESTOR) 10 MG tablet, Take 10 mg by mouth daily, Disp: , Rfl:     traMADol (ULTRAM) 50 mg tablet, Take 1 tablet (50 mg total) by mouth 3 (three) times a day as needed for moderate pain, Disp: 90 tablet, Rfl: 1    Allergies   Allergen Reactions    Atorvastatin        Physical Exam:    /70 (BP Location: Left arm, Patient Position: Sitting, Cuff Size: Standard)   Temp 98 5 °F (36 9 °C)   Ht 5' 6" (1 676 m)   Wt 56 3 kg (124 lb 3 2 oz)   BMI 20 05 kg/m²     Constitutional:normal, well developed, well nourished, alert, in no distress and non-toxic and no overt pain behavior  Eyes:anicteric  HEENT:grossly intact  Neck:supple, symmetric, trachea midline and no masses   Pulmonary:even and unlabored  Cardiovascular:No edema or pitting edema present  Skin:Normal without rashes or lesions and well hydrated  Psychiatric:Mood and affect appropriate  Neurologic:Cranial Nerves II-XII grossly intact  Musculoskeletal:normal    Imaging  MRI THORACIC SPINE WITH AND WITHOUT CONTRAST     INDICATION: M96 1: Postlaminectomy syndrome, not elsewhere classified      COMPARISON:  MRI lumbar spine without contrast December 17, 2021  CT lung screening program July 7, 2021      TECHNIQUE:  Sagittal T1, sagittal T2, sagittal inversion recovery, axial T2,  axial 2D MERGE  Sagittal and axial T1 postcontrast      IV Contrast:  6 mL of Gadobutrol injection (SINGLE-DOSE)      IMAGE QUALITY:  Diagnostic      FINDINGS:     Postsurgical laminectomy changes at T2 and T3 level      ALIGNMENT:  Normal alignment of the thoracic spine  No compression fracture  No subluxation  No evidence of scoliosis      MARROW SIGNAL:  Small atypical hemangioma at T9 vertebral body    No pathologic marrow replacing lesion      THORACIC CORD/SPINAL CANAL:  Short-segment myelomalacia at T2-T3 spinal cord level with associated cord atrophy  No other signal abnormality in thoracic spinal cord  No abnormal enhancement      Long-segment anterior epidural space CSF-fluid collection spanning from C5 level to inferior T12 level which measures 0 8 cm in greatest AP dimension at T3 level (5:9)  There is dramatic conspicuity of the anterior epidural ligaments (midline and lateral   anterior epidural ligaments) Diffuse mild canal stenosis throughout the thoracic spine secondary to this large anterior epidural fluid collection  Scattered gradient signal blooming at anterior aspect of the thecal sac at T2-T3 level, which may be due   to blood products or prior postsurgical change  Some turbulent CSF flow is noted at the anterior epidural space at approximately T9-T10 level      PREVERTEBRAL AND PARASPINAL SOFT TISSUES:   Normal      THORACIC DEGENERATIVE CHANGE:  Multilevel degenerative changes consists of osteophytes and mild disc height loss  Diffuse mild canal stenosis throughout the thoracic spinal cord, worse at T3 level      POSTCONTRAST:  No abnormal enhancement      OTHER: Poststernotomy      IMPRESSION:     Probable short-segment myelomalacia at T2-T3 spinal cord level with associated cord atrophy likely related to prior surgery at this level status post T2-T3 laminectomies  No abnormal enhancement      Long-segment anterior epidural space CSF-fluid collection spanning from C5 to inferior T12 level resulting in diffuse mild canal stenosis throughout the thoracic spine, worse at T3 level  Question chronic dural tear leading to a communication between   subdural and epidural space      The study was marked in EPIC for MRI LUMBAR SPINE WITHOUT CONTRAST     INDICATION: M54 50: Low back pain, unspecified      COMPARISON:  MRI thoracic spine with and without contrast December 17, 2021    MRI lumbar spine without contrast 2/4/2019      TECHNIQUE:  Sagittal T1, sagittal T2, sagittal inversion recovery, axial T1 and axial T2, coronal T2     IMAGE QUALITY:  Diagnostic     FINDINGS:     VERTEBRAL BODIES:  There is a transitional lumbosacral junction - partially sacralized L5 vertebral body with bilateral assimilation joints  Mild levoscoliosis of lumbar spine  No spondylolysis or spondylolisthesis  No compression fracture         Normal marrow signal is identified within the visualized bony structures  No discrete marrow lesion      SACRUM:  Normal signal within the sacrum  No evidence of insufficiency or stress fracture      DISTAL CORD AND CONUS:  Normal size and signal within the distal cord and conus      PARASPINAL SOFT TISSUES:  Paraspinal soft tissues are unremarkable      LOWER THORACIC DISC SPACES:  Mild noncompressive lower thoracic degenerative change      LUMBAR DISC SPACES:  Multilevel osteophytes, disc height loss, and facet arthropathy      L1-L2: Normal      L2-L3: Diffuse disc bulge and small central posterior annular fissure  Facet arthropathy  No significant canal stenosis or foraminal narrowing      L3-L4: Diffuse disc bulge and small central posterior annular fissure  Facet arthropathy  No significant canal stenosis or foraminal narrowing      L4-L5: Diffuse disc bulge and small left foraminal disc protrusion  Hypertrophic facet arthropathy, ligamentum flavum thickening, trace facet joint effusions bilaterally  Mild canal stenosis, slightly worse  Mild-to-moderate right and mild left   foraminal narrowing, slightly worse from prior exam      L5-S1: Facet arthropathy  No significant canal stenosis or foraminal narrowing      IMPRESSION:     Slightly worsened multilevel degenerative changes of lumbar spine with transitional lumbosacral anatomy (partially sacralized L5 vertebral body), varying degrees of canal stenosis (mild L4-L5) and foraminal narrowing (mild-to-moderate right L4-L5; mild   left L4-L5), as detailed above       significant notification

## 2022-05-11 LAB
6MAM UR QL CFM: NEGATIVE NG/ML
7AMINOCLONAZEPAM UR QL CFM: NEGATIVE NG/ML
A-OH ALPRAZ UR QL CFM: NEGATIVE NG/ML
ACCEPTABLE CREAT UR QL: ABNORMAL MG/DL
ACCEPTIBLE SP GR UR QL: NORMAL
AMPHET UR QL CFM: NEGATIVE NG/ML
AMPHET UR QL CFM: NEGATIVE NG/ML
BUPRENORPHINE UR QL CFM: NEGATIVE NG/ML
BUTALBITAL UR QL CFM: NEGATIVE NG/ML
BZE UR QL CFM: NEGATIVE NG/ML
CODEINE UR QL CFM: NEGATIVE NG/ML
DESIPRAMINE UR QL CFM: NEGATIVE NG/ML
DESIPRAMINE UR QL CFM: NEGATIVE NG/ML
EDDP UR QL CFM: NEGATIVE NG/ML
ETHYL GLUCURONIDE UR QL CFM: NEGATIVE NG/ML
ETHYL SULFATE UR QL SCN: NEGATIVE NG/ML
EUTYLONE UR QL: NEGATIVE NG/ML
FENTANYL UR QL CFM: NEGATIVE NG/ML
GLIADIN IGG SER IA-ACNC: NEGATIVE NG/ML
GLUCOSE 30M P 50 G LAC PO SERPL-MCNC: NEGATIVE NG/ML
HYDROCODONE UR QL CFM: NEGATIVE NG/ML
HYDROCODONE UR QL CFM: NEGATIVE NG/ML
HYDROMORPHONE UR QL CFM: NEGATIVE NG/ML
IMIPRAMINE UR QL CFM: NEGATIVE NG/ML
LORAZEPAM UR QL CFM: NEGATIVE NG/ML
MDMA UR QL CFM: NEGATIVE NG/ML
ME-PHENIDATE UR QL CFM: NEGATIVE NG/ML
MEPERIDINE UR QL CFM: NEGATIVE NG/ML
METHADONE UR QL CFM: NEGATIVE NG/ML
METHAMPHET UR QL CFM: NEGATIVE NG/ML
MORPHINE UR QL CFM: NEGATIVE NG/ML
MORPHINE UR QL CFM: NEGATIVE NG/ML
NITRITE UR QL: NORMAL UG/ML
NORBUPRENORPHINE UR QL CFM: NEGATIVE NG/ML
NORDIAZEPAM UR QL CFM: NEGATIVE NG/ML
NORFENTANYL UR QL CFM: NEGATIVE NG/ML
NORHYDROCODONE UR QL CFM: NEGATIVE NG/ML
NORHYDROCODONE UR QL CFM: NEGATIVE NG/ML
NORMEPERIDINE UR QL CFM: NEGATIVE NG/ML
NOROXYCODONE UR QL CFM: NEGATIVE NG/ML
OLANZAPINE QUANTIFICATION: NEGATIVE NG/ML
OPC-3373 QUANTIFICATION: NEGATIVE
OXAZEPAM UR QL CFM: NEGATIVE NG/ML
OXYCODONE UR QL CFM: NEGATIVE NG/ML
OXYMORPHONE UR QL CFM: NEGATIVE NG/ML
OXYMORPHONE UR QL CFM: NEGATIVE NG/ML
PCP UR QL CFM: NEGATIVE NG/ML
PHENOBARB UR QL CFM: NEGATIVE NG/ML
SECOBARBITAL UR QL CFM: NEGATIVE NG/ML
SL AMB 3-METHYL-FENTANYL QUANTIFICATION: NORMAL NG/ML
SL AMB 4-ANPP QUANTIFICATION: NORMAL NG/ML
SL AMB 4-FIBF QUANTIFICATION: NORMAL NG/ML
SL AMB 5F-ADB-M7 METABOLITE QUANTIFICATION: NEGATIVE NG/ML
SL AMB 7-OH-MITRAGYNINE (KRATOM ALKALOID) QUANTIFICATION: NEGATIVE NG/ML
SL AMB AB-FUBINACA-M3 METABOLITE QUANTIFICATION: NEGATIVE NG/ML
SL AMB ACETYL FENTANYL QUANTIFICATION: NORMAL NG/ML
SL AMB ACETYL NORFENTANYL QUANTIFICATION: NORMAL NG/ML
SL AMB ACRYL FENTANYL QUANTIFICATION: NORMAL NG/ML
SL AMB BUTRYL FENTANYL QUANTIFICATION: NORMAL NG/ML
SL AMB CARFENTANIL QUANTIFICATION: NORMAL NG/ML
SL AMB CLOZAPINE QUANTIFICATION: NEGATIVE NG/ML
SL AMB CTHC (MARIJUANA METABOLITE) QUANTIFICATION: NEGATIVE NG/ML
SL AMB CYCLOPROPYL FENTANYL QUANTIFICATION: NORMAL NG/ML
SL AMB DEXTROMETHORPHAN QUANTIFICATION: NEGATIVE NG/ML
SL AMB DEXTRORPHAN (DEXTROMETHORPHAN METABOLITE) QUANT: NEGATIVE NG/ML
SL AMB DEXTRORPHAN (DEXTROMETHORPHAN METABOLITE) QUANT: NEGATIVE NG/ML
SL AMB FURANYL FENTANYL QUANTIFICATION: NORMAL NG/ML
SL AMB HALOPERIDOL  QUANTIFICATION: NEGATIVE NG/ML
SL AMB HALOPERIDOL METABOLITE QUANTIFICATION: NEGATIVE NG/ML
SL AMB HYDROXYRISPERIDONE QUANTIFICATION: NEGATIVE NG/ML
SL AMB JWH018 METABOLITE QUANTIFICATION: NEGATIVE NG/ML
SL AMB JWH073 METABOLITE QUANTIFICATION: NEGATIVE NG/ML
SL AMB MDMB-FUBINACA-M1 METABOLITE QUANTIFICATION: NEGATIVE NG/ML
SL AMB METHOXYACETYL FENTANYL QUANTIFICATION: NORMAL NG/ML
SL AMB METHYLONE QUANTIFICATION: NEGATIVE NG/ML
SL AMB N-DESMETHYL U-47700 QUANTIFICATION: NORMAL NG/ML
SL AMB N-DESMETHYL-TRAMADOL QUANTIFICATION: NORMAL NG/ML
SL AMB N-DESMETHYLCLOZAPINE QUANTIFICATION: NEGATIVE NG/ML
SL AMB NORQUETIAPINE QUANTIFICATION: NEGATIVE NG/ML
SL AMB PHENTERMINE QUANTIFICATION: NEGATIVE NG/ML
SL AMB QUETIAPINE QUANTIFICATION: NEGATIVE NG/ML
SL AMB RCS4 METABOLITE QUANTIFICATION: NEGATIVE NG/ML
SL AMB RISPERIDONE QUANTIFICATION: NEGATIVE NG/ML
SL AMB RITALINIC ACID QUANTIFICATION: NEGATIVE NG/ML
SL AMB U-47700 QUANTIFICATION: NORMAL NG/ML
SPECIMEN DRAWN SERPL: NEGATIVE NG/ML
SPECIMEN PH ACCEPTABLE UR: NORMAL
TAPENTADOL UR QL CFM: NEGATIVE NG/ML
TEMAZEPAM UR QL CFM: NEGATIVE NG/ML
TEMAZEPAM UR QL CFM: NEGATIVE NG/ML
TRAMADOL UR QL CFM: NORMAL NG/ML
URATE/CREAT 24H UR: NORMAL NG/ML

## 2022-06-03 ENCOUNTER — APPOINTMENT (OUTPATIENT)
Dept: LAB | Facility: CLINIC | Age: 67
End: 2022-06-03
Payer: COMMERCIAL

## 2022-06-03 DIAGNOSIS — E78.5 HYPERLIPIDEMIA, UNSPECIFIED HYPERLIPIDEMIA TYPE: ICD-10-CM

## 2022-06-03 DIAGNOSIS — I10 HYPERTENSION, UNSPECIFIED TYPE: ICD-10-CM

## 2022-06-03 DIAGNOSIS — R73.9 ELEVATED BLOOD SUGAR: ICD-10-CM

## 2022-06-03 DIAGNOSIS — I25.10 ASCVD (ARTERIOSCLEROTIC CARDIOVASCULAR DISEASE): ICD-10-CM

## 2022-06-03 LAB
ALBUMIN SERPL BCP-MCNC: 4.1 G/DL (ref 3.5–5)
ALP SERPL-CCNC: 84 U/L (ref 46–116)
ALT SERPL W P-5'-P-CCNC: 19 U/L (ref 12–78)
ANION GAP SERPL CALCULATED.3IONS-SCNC: 5 MMOL/L (ref 4–13)
AST SERPL W P-5'-P-CCNC: 17 U/L (ref 5–45)
BASOPHILS # BLD AUTO: 0.1 THOUSANDS/ΜL (ref 0–0.1)
BASOPHILS NFR BLD AUTO: 1 % (ref 0–1)
BILIRUB SERPL-MCNC: 0.63 MG/DL (ref 0.2–1)
BUN SERPL-MCNC: 16 MG/DL (ref 5–25)
CALCIUM SERPL-MCNC: 9.6 MG/DL (ref 8.3–10.1)
CHLORIDE SERPL-SCNC: 107 MMOL/L (ref 100–108)
CHOLEST SERPL-MCNC: 123 MG/DL
CO2 SERPL-SCNC: 27 MMOL/L (ref 21–32)
CREAT SERPL-MCNC: 1.05 MG/DL (ref 0.6–1.3)
EOSINOPHIL # BLD AUTO: 0.48 THOUSAND/ΜL (ref 0–0.61)
EOSINOPHIL NFR BLD AUTO: 5 % (ref 0–6)
ERYTHROCYTE [DISTWIDTH] IN BLOOD BY AUTOMATED COUNT: 13.2 % (ref 11.6–15.1)
EST. AVERAGE GLUCOSE BLD GHB EST-MCNC: 100 MG/DL
GFR SERPL CREATININE-BSD FRML MDRD: 73 ML/MIN/1.73SQ M
GLUCOSE P FAST SERPL-MCNC: 107 MG/DL (ref 65–99)
HBA1C MFR BLD: 5.1 %
HCT VFR BLD AUTO: 45.6 % (ref 36.5–49.3)
HDLC SERPL-MCNC: 61 MG/DL
HGB BLD-MCNC: 15.5 G/DL (ref 12–17)
IMM GRANULOCYTES # BLD AUTO: 0.02 THOUSAND/UL (ref 0–0.2)
IMM GRANULOCYTES NFR BLD AUTO: 0 % (ref 0–2)
LDLC SERPL CALC-MCNC: 44 MG/DL (ref 0–100)
LYMPHOCYTES # BLD AUTO: 2.06 THOUSANDS/ΜL (ref 0.6–4.47)
LYMPHOCYTES NFR BLD AUTO: 22 % (ref 14–44)
MCH RBC QN AUTO: 30.8 PG (ref 26.8–34.3)
MCHC RBC AUTO-ENTMCNC: 34 G/DL (ref 31.4–37.4)
MCV RBC AUTO: 91 FL (ref 82–98)
MONOCYTES # BLD AUTO: 0.75 THOUSAND/ΜL (ref 0.17–1.22)
MONOCYTES NFR BLD AUTO: 8 % (ref 4–12)
NEUTROPHILS # BLD AUTO: 6.14 THOUSANDS/ΜL (ref 1.85–7.62)
NEUTS SEG NFR BLD AUTO: 64 % (ref 43–75)
NONHDLC SERPL-MCNC: 62 MG/DL
NRBC BLD AUTO-RTO: 0 /100 WBCS
PLATELET # BLD AUTO: 188 THOUSANDS/UL (ref 149–390)
PMV BLD AUTO: 11.7 FL (ref 8.9–12.7)
POTASSIUM SERPL-SCNC: 5.2 MMOL/L (ref 3.5–5.3)
PROT SERPL-MCNC: 7.2 G/DL (ref 6.4–8.2)
RBC # BLD AUTO: 5.03 MILLION/UL (ref 3.88–5.62)
SODIUM SERPL-SCNC: 139 MMOL/L (ref 136–145)
TRIGL SERPL-MCNC: 90 MG/DL
WBC # BLD AUTO: 9.55 THOUSAND/UL (ref 4.31–10.16)

## 2022-06-03 PROCEDURE — 83036 HEMOGLOBIN GLYCOSYLATED A1C: CPT

## 2022-06-03 PROCEDURE — 36415 COLL VENOUS BLD VENIPUNCTURE: CPT

## 2022-06-03 PROCEDURE — 80053 COMPREHEN METABOLIC PANEL: CPT

## 2022-06-03 PROCEDURE — 80061 LIPID PANEL: CPT

## 2022-06-03 PROCEDURE — 85025 COMPLETE CBC W/AUTO DIFF WBC: CPT

## 2022-07-08 ENCOUNTER — OFFICE VISIT (OUTPATIENT)
Dept: PAIN MEDICINE | Facility: CLINIC | Age: 67
End: 2022-07-08
Payer: MEDICARE

## 2022-07-08 VITALS
DIASTOLIC BLOOD PRESSURE: 57 MMHG | BODY MASS INDEX: 18.96 KG/M2 | WEIGHT: 118 LBS | HEIGHT: 66 IN | HEART RATE: 85 BPM | SYSTOLIC BLOOD PRESSURE: 88 MMHG

## 2022-07-08 DIAGNOSIS — G89.4 CHRONIC PAIN SYNDROME: Primary | ICD-10-CM

## 2022-07-08 DIAGNOSIS — M51.16 LUMBAR DISC DISEASE WITH RADICULOPATHY: ICD-10-CM

## 2022-07-08 DIAGNOSIS — M47.816 LUMBAR SPONDYLOSIS: ICD-10-CM

## 2022-07-08 DIAGNOSIS — M54.9 MID BACK PAIN: ICD-10-CM

## 2022-07-08 PROCEDURE — 99214 OFFICE O/P EST MOD 30 MIN: CPT | Performed by: NURSE PRACTITIONER

## 2022-07-08 RX ORDER — METHOCARBAMOL 500 MG/1
500 TABLET, FILM COATED ORAL 2 TIMES DAILY
Qty: 60 TABLET | Refills: 2 | Status: SHIPPED | OUTPATIENT
Start: 2022-07-08

## 2022-07-08 RX ORDER — GABAPENTIN 600 MG/1
600 TABLET ORAL 2 TIMES DAILY
Qty: 180 TABLET | Refills: 1 | Status: SHIPPED | OUTPATIENT
Start: 2022-07-08 | End: 2022-09-15 | Stop reason: SDUPTHER

## 2022-07-08 RX ORDER — TRAMADOL HYDROCHLORIDE 50 MG/1
50 TABLET ORAL 3 TIMES DAILY PRN
Qty: 90 TABLET | Refills: 1 | Status: SHIPPED | OUTPATIENT
Start: 2022-07-08 | End: 2022-09-15 | Stop reason: SDUPTHER

## 2022-07-08 RX ORDER — TRAMADOL HYDROCHLORIDE 50 MG/1
50 TABLET ORAL 3 TIMES DAILY PRN
COMMUNITY
Start: 2022-06-15 | End: 2022-07-08 | Stop reason: SDUPTHER

## 2022-07-08 NOTE — PATIENT INSTRUCTIONS

## 2022-07-08 NOTE — PROGRESS NOTES
Assessment:  1  Chronic pain syndrome    2  Lumbar disc disease with radiculopathy    3  Lumbar spondylosis    4  Mid back pain        Plan:  While the patient was in the office today, I did have a thorough conversation regarding their chronic pain syndrome, medication management, and treatment plan options  Patient is being seen for follow-up visit  Overall, he reports that his pain is reasonably controlled with his current medication regimen  Also, he retired last week and states that he seems to have less pain since he is not sitting at a desk all day  Will consider repeating lumbar facet radiofrequency ablations as needed  Continue Robaxin 500 mg twice daily if needed for spasms  Prescription was sent to his pharmacy  Continue gabapentin 600 mg twice daily to address the neuropathic component of his pain  Ninety day supply this medication was sent to his pharmacy with 1 refill  Continue tramadol 50 mg 3 times daily if needed for pain  A prescription was sent to his pharmacy dated for 07/13/2022 with 1 refill  There are risks associated with opioid medications, including dependence, addiction and tolerance  The patient understands and agrees to use these medications only as prescribed  Potential side effects of the medications include, but are not limited to, constipation, drowsiness, addiction, impaired judgment and risk of fatal overdose if not taken as prescribed  The patient was warned against driving while taking sedation medications  Sharing medications is a felony  At this point in time, the patient is showing no signs of addiction, abuse, diversion or suicidal ideation  [facility] Prescription Drug Monitoring Program report was reviewed and was appropriate     The patient will follow-up in 2 months for medication prescription refill and reevaluation  The patient was advised to contact the office should their symptoms worsen in the interim   The patient was agreeable and verbalized an understanding  History of Present Illness: The patient is a 79 y o  male who presents for a follow up office visit in regards to Back Pain and Leg Pain  The patients current symptoms include complaints of low back pain right leg pain  Current pain level is a 7/10  Quality pain is described as burning, dull  Current pain medications includes:  Tramadol 50 mg twice daily if needed for pain, gabapentin 600 mg twice daily, Robaxin 500 mg twice daily as needed   The patient reports that this regimen is providing 20 % pain relief  The patient is reporting no side effects from this pain medication regimen  I have personally reviewed and/or updated the patient's past medical history, past surgical history, family history, social history, current medications, allergies, and vital signs today  Review of Systems  Review of Systems   Constitutional: Negative for chills and fever  HENT: Negative for ear pain and sore throat  Eyes: Negative for pain and visual disturbance  Respiratory: Negative for cough and shortness of breath  Cardiovascular: Negative for chest pain and palpitations  Gastrointestinal: Negative for abdominal pain and vomiting  Genitourinary: Negative for dysuria and hematuria  Musculoskeletal: Positive for myalgias  Negative for arthralgias and back pain  Decreased range of motion     Skin: Negative for color change and rash  Neurological: Negative for seizures and syncope  All other systems reviewed and are negative          Past Medical History:   Diagnosis Date    BCC (basal cell carcinoma)     in past left side of nose    Chronic pain disorder     low back and down rle    Coronary artery disease     cabg x 1 2006    Hyperlipidemia     Malignant neoplasm prostate [facility])        Past Surgical History:   Procedure Laterality Date    COLONOSCOPY      CORONARY ARTERY BYPASS GRAFT  07/10/2006    LIMA to the diagonal artery    INGUINAL HERNIA REPAIR Right 1996    IN LAP,PROSTATECTOMY,RADICAL,W/NERVE SPARE,INCL ROBOTIC N/A 4/22/2019    Procedure: PROSTATECTOMY RADICAL W/ROBOTICS, B/L PELVIC NODE DISSECT;  Surgeon: Doug Carlos MD;  [facility];  Service: Urology    SKIN CANCER EXCISION  2016    left side nose    THORACIC SPINE SURGERY      T3-T4    US GUIDED PROSTATE BIOPSY  2/28/2019       Family History   Problem Relation Age of Onset    Cancer Father         bladder    Lung cancer Father         metastatic       Social History     Occupational History    Not on file   Tobacco Use    Smoking status: Current Every Day Smoker     Packs/day: 0 50     Years: 30 00     Pack years: 15 00     Types: Cigarettes    Smokeless tobacco: Never Used   Substance and Sexual Activity    Alcohol use: Not Currently    Drug use: Never    Sexual activity: Not on file         Current Outpatient Medications:     acetaminophen (TYLENOL) 500 mg tablet, Take 500 mg by mouth Three times a day, Disp: , Rfl:     aspirin (ECOTRIN LOW STRENGTH) 81 mg EC tablet, Take 81 mg by mouth daily, Disp: , Rfl:     docusate sodium (COLACE) 100 mg capsule, Take 1 capsule (100 mg total) by mouth 2 (two) times a day (Patient taking differently: Take 100 mg by mouth daily), Disp: 30 capsule, Rfl: 0    DULoxetine (CYMBALTA) 60 mg delayed release capsule, Take 60 mg by mouth daily, Disp: , Rfl:     gabapentin (NEURONTIN) 600 MG tablet, Take 1 tablet (600 mg total) by mouth 2 (two) times a day, Disp: 180 tablet, Rfl: 1    ibuprofen (MOTRIN) 200 mg tablet, Take by mouth every 6 (six) hours as needed for mild pain, Disp: , Rfl:     methocarbamol (ROBAXIN) 500 mg tablet, Take 1 tablet (500 mg total) by mouth 2 (two) times a day, Disp: 60 tablet, Rfl: 2    rosuvastatin (CRESTOR) 10 MG tablet, Take 10 mg by mouth daily, Disp: , Rfl:     traMADol (ULTRAM) 50 mg tablet, Take 1 tablet (50 mg total) by mouth 3 (three) times a day as needed for moderate pain or severe pain Do not fill until 7/13/2022, Disp: 90 tablet, Rfl: 1    Allergies   Allergen Reactions    Atorvastatin        Physical Exam:    BP (!) 88/57   Pulse 85   Ht 5' 6" (1 676 m)   Wt 53 5 kg (118 lb)   BMI 19 05 kg/m²     Constitutional:normal, well developed, well nourished, alert, in no distress and non-toxic and no overt pain behavior  Eyes:anicteric  HEENT:grossly intact  Neck:supple, symmetric, trachea midline and no masses   Pulmonary:even and unlabored  Cardiovascular:No edema or pitting edema present  Skin:Normal without rashes or lesions and well hydrated  Psychiatric:Mood and affect appropriate  Neurologic:Cranial Nerves II-XII grossly intact  Musculoskeletal:normal    Imaging  No orders to display       No orders of the defined types were placed in this encounter

## 2022-07-21 ENCOUNTER — HOSPITAL ENCOUNTER (OUTPATIENT)
Dept: CT IMAGING | Facility: HOSPITAL | Age: 67
Discharge: HOME | End: 2022-07-21
Attending: INTERNAL MEDICINE
Payer: MEDICARE

## 2022-07-21 DIAGNOSIS — F17.210 CIGARETTE SMOKER: ICD-10-CM

## 2022-07-21 PROCEDURE — 71271 CT THORAX LUNG CANCER SCR C-: CPT

## 2022-09-15 ENCOUNTER — OFFICE VISIT (OUTPATIENT)
Dept: PAIN MEDICINE | Facility: CLINIC | Age: 67
End: 2022-09-15
Payer: MEDICARE

## 2022-09-15 VITALS
BODY MASS INDEX: 18.68 KG/M2 | SYSTOLIC BLOOD PRESSURE: 106 MMHG | HEIGHT: 66 IN | HEART RATE: 68 BPM | WEIGHT: 116.2 LBS | DIASTOLIC BLOOD PRESSURE: 70 MMHG

## 2022-09-15 DIAGNOSIS — M47.816 LUMBAR SPONDYLOSIS: ICD-10-CM

## 2022-09-15 DIAGNOSIS — Z79.891 LONG-TERM CURRENT USE OF OPIATE ANALGESIC: ICD-10-CM

## 2022-09-15 DIAGNOSIS — M54.9 MID BACK PAIN: ICD-10-CM

## 2022-09-15 DIAGNOSIS — M51.16 LUMBAR DISC DISEASE WITH RADICULOPATHY: ICD-10-CM

## 2022-09-15 DIAGNOSIS — G89.4 CHRONIC PAIN SYNDROME: Primary | ICD-10-CM

## 2022-09-15 DIAGNOSIS — F11.20 UNCOMPLICATED OPIOID DEPENDENCE (HCC): ICD-10-CM

## 2022-09-15 PROCEDURE — 99214 OFFICE O/P EST MOD 30 MIN: CPT | Performed by: NURSE PRACTITIONER

## 2022-09-15 PROCEDURE — 80305 DRUG TEST PRSMV DIR OPT OBS: CPT | Performed by: NURSE PRACTITIONER

## 2022-09-15 RX ORDER — GABAPENTIN 600 MG/1
600 TABLET ORAL 3 TIMES DAILY
Qty: 270 TABLET | Refills: 1 | Status: SHIPPED | OUTPATIENT
Start: 2022-09-15 | End: 2022-10-15

## 2022-09-15 RX ORDER — TRAMADOL HYDROCHLORIDE 50 MG/1
50 TABLET ORAL 3 TIMES DAILY PRN
Qty: 90 TABLET | Refills: 1 | Status: SHIPPED | OUTPATIENT
Start: 2022-09-15

## 2022-09-15 NOTE — PATIENT INSTRUCTIONS

## 2022-09-15 NOTE — PROGRESS NOTES
Assessment:  1  Chronic pain syndrome    2  Lumbar disc disease with radiculopathy    3  Lumbar spondylosis    4  Long-term current use of opiate analgesic    5  Uncomplicated opioid dependence ([address] )        Plan:  While the patient was in the office today, I did have a thorough conversation regarding their chronic pain syndrome, medication management, and treatment plan options  Patient is being seen for follow-up visit  Patient has been experiencing increased pain lately, especially in the afternoon  We discussed possibility of repeating lumbar facet radiofrequency ablations  Patient states that he does not feel his pain is bad enough yet to warrant interventional therapy  We will increase his gabapentin from 600mg twice a day to 600 mg 3 times a day  A new prescription was sent to his pharmacy  Continue tramadol 50 mg 3 times daily if needed for pain  A prescription was sent to his pharmacy with a fill date of 09/25/2022 with 1 refill  Continue Robaxin 500 mg twice daily if needed for spasms  He did not require a refill this medication during today's visit  [place] Prescription Drug Monitoring Program report was reviewed and was appropriate     A urine drug screen was collected at today's office visit as part of our medication management protocol  The point of care testing results were appropriate for what was being prescribed  The specimen will be sent for confirmatory testing  The drug screen is medically necessary because the patient is either dependent on opioid medication or is being considered for opioid medication therapy and the results could impact ongoing or future treatment  The drug screen is to evaluate for the presences or absence of prescribed, non-prescribed, and/or illicit drugs/substances  There are risks associated with opioid medications, including dependence, addiction and tolerance  The patient understands and agrees to use these medications only as prescribed  Potential side effects of the medications include, but are not limited to, constipation, drowsiness, addiction, impaired judgment and risk of fatal overdose if not taken as prescribed  The patient was warned against driving while taking sedation medications  Sharing medications is a felony  At this point in time, the patient is showing no signs of addiction, abuse, diversion or suicidal ideation  The patient will follow-up in 8 weeks for medication prescription refill and reevaluation  The patient was advised to contact the office should their symptoms worsen in the interim  The patient was agreeable and verbalized an understanding  History of Present Illness: The patient is a 79 y o  male last seen on 07/08/2022 who presents for a follow up office visit in regards to chronic pain secondary to chronic pain syndrome, chronic low back pain, lumbar degenerative disc disease, lumbar radiculopathy, lumbar spondylosis  The patient currently reports complaints of low back pain that can radiate down the right leg all the way to his ankle and the left thigh  Current pain level is a 7/10  Quality pain is described as burning, dull, aching, sharp, numb, pins and needles peer    Current pain medications includes:  Tramadol 50 mg 3 times daily if needed for pain, gabapentin 600 mg twice daily, Robaxin 500 mg twice daily if needed for spasms    The patient reports that this regimen is providing 40% pain relief  The patient is reporting no side effects from this pain medication regimen  Pain Contract Signed: 4/4/22  Last Urine Drug Screen: 9/15/22    I have personally reviewed and/or updated the patient's past medical history, past surgical history, family history, social history, current medications, allergies, and vital signs today  Review of Systems:    Review of Systems   Constitutional: Negative for unexpected weight change  HENT: Negative for hearing loss      Eyes: Negative for visual disturbance  Respiratory: Negative for shortness of breath  Cardiovascular: Negative for leg swelling  Gastrointestinal: Negative for constipation  Endocrine: Negative for polyuria  Genitourinary: Negative for difficulty urinating  Musculoskeletal: Positive for arthralgias, gait problem and myalgias  Negative for joint swelling  Pain in extremity- both legs (groin to knee)  Rt leg to toes   Skin: Negative for rash  Neurological: Negative for weakness and headaches  Psychiatric/Behavioral: Negative for decreased concentration  All other systems reviewed and are negative          Past Medical History:   Diagnosis Date    BCC (basal cell carcinoma)     in past left side of nose    Chronic pain disorder     low back and down rle    Coronary artery disease     cabg x 1 2006    Hyperlipidemia     Malignant neoplasm prostate [facility])        Past Surgical History:   Procedure Laterality Date    COLONOSCOPY      CORONARY ARTERY BYPASS GRAFT  07/10/2006    LIMA to the diagonal artery    INGUINAL HERNIA REPAIR Right 1996    [place] LAP,PROSTATECTOMY,RADICAL,W/NERVE SPARE,INCL ROBOTIC N/A 4/22/2019    Procedure: PROSTATECTOMY RADICAL W/ROBOTICS, B/L PELVIC NODE DISSECT;  Surgeon: Mónica Tenorio MD;  Location: [facility];  Service: Urology    SKIN CANCER EXCISION  2016    left side nose    THORACIC SPINE SURGERY      T3-T4    US GUIDED PROSTATE BIOPSY  2/28/2019       Family History   Problem Relation Age of Onset    Cancer Father         bladder    Lung cancer Father         metastatic       Social History     Occupational History    Not on file   Tobacco Use    Smoking status: Current Every Day Smoker     Packs/day: 0 50     Years: 30 00     Pack years: 15 00     Types: Cigarettes    Smokeless tobacco: Never Used   Substance and Sexual Activity    Alcohol use: Not Currently    Drug use: Never    Sexual activity: Not on file         Current Outpatient Medications:     acetaminophen (TYLENOL) 500 mg tablet, Take 500 mg by mouth Three times a day, Disp: , Rfl:     aspirin (ECOTRIN LOW STRENGTH) 81 mg EC tablet, Take 81 mg by mouth daily, Disp: , Rfl:     docusate sodium (COLACE) 100 mg capsule, Take 1 capsule (100 mg total) by mouth 2 (two) times a day (Patient taking differently: Take 100 mg by mouth daily), Disp: 30 capsule, Rfl: 0    DULoxetine (CYMBALTA) 60 mg delayed release capsule, Take 60 mg by mouth daily, Disp: , Rfl:     gabapentin (NEURONTIN) 600 MG tablet, Take 1 tablet (600 mg total) by mouth 2 (two) times a day, Disp: 180 tablet, Rfl: 1    ibuprofen (MOTRIN) 200 mg tablet, Take by mouth every 6 (six) hours as needed for mild pain, Disp: , Rfl:     methocarbamol (ROBAXIN) 500 mg tablet, Take 1 tablet (500 mg total) by mouth 2 (two) times a day, Disp: 60 tablet, Rfl: 2    rosuvastatin (CRESTOR) 10 MG tablet, Take 10 mg by mouth daily, Disp: , Rfl:     traMADol (ULTRAM) 50 mg tablet, Take 1 tablet (50 mg total) by mouth 3 (three) times a day as needed for moderate pain or severe pain Do not fill until 7/13/2022, Disp: 90 tablet, Rfl: 1    Allergies   Allergen Reactions    Atorvastatin        Physical Exam:    Ht 5' 6" (1 676 m)   BMI 19 05 kg/m²     Constitutional:normal, well developed, well nourished, alert, in no distress and non-toxic and no overt pain behavior  Eyes:anicteric  HEENT:grossly intact  Neck:supple, symmetric, trachea midline and no masses   Pulmonary:even and unlabored  Cardiovascular:No edema or pitting edema present  Skin:Normal without rashes or lesions and well hydrated  Psychiatric:Mood and affect appropriate  Neurologic:Cranial Nerves II-XII grossly intact  Musculoskeletal:Gait is slow and guarded  range of motion of the lumbar spine is limited in all planes  There are lumbar paraspinal muscle spasms present  Imaging  No orders to display         No orders of the defined types were placed in this encounter

## 2022-09-17 LAB
6MAM UR QL CFM: NEGATIVE NG/ML
7AMINOCLONAZEPAM UR QL CFM: NEGATIVE NG/ML
A-OH ALPRAZ UR QL CFM: NEGATIVE NG/ML
ACCEPTABLE CREAT UR QL: NORMAL MG/DL
ACCEPTIBLE SP GR UR QL: NORMAL
AMPHET UR QL CFM: NEGATIVE NG/ML
AMPHET UR QL CFM: NEGATIVE NG/ML
BUPRENORPHINE UR QL CFM: NEGATIVE NG/ML
BUTALBITAL UR QL CFM: NEGATIVE NG/ML
BZE UR QL CFM: NEGATIVE NG/ML
CODEINE UR QL CFM: NEGATIVE NG/ML
DESIPRAMINE UR QL CFM: NEGATIVE NG/ML
EDDP UR QL CFM: NEGATIVE NG/ML
ETHYL GLUCURONIDE UR QL CFM: NEGATIVE NG/ML
ETHYL SULFATE UR QL SCN: NEGATIVE NG/ML
FENTANYL UR QL CFM: NEGATIVE NG/ML
GLIADIN IGG SER IA-ACNC: NEGATIVE NG/ML
GLUCOSE 30M P 50 G LAC PO SERPL-MCNC: NEGATIVE NG/ML
HYDROCODONE UR QL CFM: NEGATIVE NG/ML
HYDROCODONE UR QL CFM: NEGATIVE NG/ML
HYDROMORPHONE UR QL CFM: NEGATIVE NG/ML
LORAZEPAM UR QL CFM: NEGATIVE NG/ML
MDMA UR QL CFM: NEGATIVE NG/ML
ME-PHENIDATE UR QL CFM: NEGATIVE NG/ML
MEPERIDINE UR QL CFM: NEGATIVE NG/ML
METHADONE UR QL CFM: NEGATIVE NG/ML
METHAMPHET UR QL CFM: NEGATIVE NG/ML
MORPHINE UR QL CFM: NEGATIVE NG/ML
MORPHINE UR QL CFM: NEGATIVE NG/ML
NITRITE UR QL: NORMAL UG/ML
NORBUPRENORPHINE UR QL CFM: NEGATIVE NG/ML
NORDIAZEPAM UR QL CFM: NEGATIVE NG/ML
NORFENTANYL UR QL CFM: NEGATIVE NG/ML
NORHYDROCODONE UR QL CFM: NEGATIVE NG/ML
NORHYDROCODONE UR QL CFM: NEGATIVE NG/ML
NORMEPERIDINE UR QL CFM: NEGATIVE NG/ML
NOROXYCODONE UR QL CFM: NEGATIVE NG/ML
OLANZAPINE QUANTIFICATION: NEGATIVE NG/ML
OPC-3373 QUANTIFICATION: NEGATIVE
OXAZEPAM UR QL CFM: NEGATIVE NG/ML
OXYCODONE UR QL CFM: NEGATIVE NG/ML
OXYMORPHONE UR QL CFM: NEGATIVE NG/ML
OXYMORPHONE UR QL CFM: NEGATIVE NG/ML
PARA-FLUOROFENTANYL QUANTIFICATION: NORMAL NG/ML
PCP UR QL CFM: NEGATIVE NG/ML
PHENOBARB UR QL CFM: NEGATIVE NG/ML
RESULT ALL_PRESCRIBED MEDS AND SPECIAL INSTRUCTIONS: NORMAL
SECOBARBITAL UR QL CFM: NEGATIVE NG/ML
SL AMB 4-ANPP QUANTIFICATION: NORMAL NG/ML
SL AMB 7-OH-MITRAGYNINE (KRATOM ALKALOID) QUANTIFICATION: NEGATIVE NG/ML
SL AMB ACETYL FENTANYL QUANTIFICATION: NORMAL NG/ML
SL AMB ACETYL NORFENTANYL QUANTIFICATION: NORMAL NG/ML
SL AMB ACRYL FENTANYL QUANTIFICATION: NORMAL NG/ML
SL AMB CARFENTANIL QUANTIFICATION: NORMAL NG/ML
SL AMB CLOZAPINE QUANTIFICATION: NEGATIVE NG/ML
SL AMB CTHC (MARIJUANA METABOLITE) QUANTIFICATION: NEGATIVE NG/ML
SL AMB DEXTRORPHAN (DEXTROMETHORPHAN METABOLITE) QUANT: NEGATIVE NG/ML
SL AMB HALOPERIDOL  QUANTIFICATION: NEGATIVE NG/ML
SL AMB HALOPERIDOL METABOLITE QUANTIFICATION: NEGATIVE NG/ML
SL AMB HYDROXYRISPERIDONE QUANTIFICATION: NEGATIVE NG/ML
SL AMB N-DESMETHYL-TRAMADOL QUANTIFICATION: NORMAL NG/ML
SL AMB N-DESMETHYLCLOZAPINE QUANTIFICATION: NEGATIVE NG/ML
SL AMB NORQUETIAPINE QUANTIFICATION: NEGATIVE NG/ML
SL AMB PHENTERMINE QUANTIFICATION: NEGATIVE NG/ML
SL AMB PREGABALIN QUANTIFICATION: NEGATIVE
SL AMB QUETIAPINE QUANTIFICATION: NEGATIVE NG/ML
SL AMB RISPERIDONE QUANTIFICATION: NEGATIVE NG/ML
SL AMB RITALINIC ACID QUANTIFICATION: NEGATIVE NG/ML
SPECIMEN PH ACCEPTABLE UR: NORMAL
TAPENTADOL UR QL CFM: NEGATIVE NG/ML
TEMAZEPAM UR QL CFM: NEGATIVE NG/ML
TEMAZEPAM UR QL CFM: NEGATIVE NG/ML
TRAMADOL UR QL CFM: NORMAL NG/ML
URATE/CREAT 24H UR: NORMAL NG/ML

## 2022-11-08 ENCOUNTER — OFFICE VISIT (OUTPATIENT)
Dept: CARDIOLOGY CLINIC | Facility: CLINIC | Age: 67
End: 2022-11-08

## 2022-11-08 VITALS
BODY MASS INDEX: 18.96 KG/M2 | DIASTOLIC BLOOD PRESSURE: 70 MMHG | SYSTOLIC BLOOD PRESSURE: 110 MMHG | HEIGHT: 66 IN | HEART RATE: 68 BPM | WEIGHT: 118 LBS

## 2022-11-08 DIAGNOSIS — I95.0 IDIOPATHIC HYPOTENSION: ICD-10-CM

## 2022-11-08 DIAGNOSIS — I25.10 CORONARY ARTERY DISEASE INVOLVING NATIVE CORONARY ARTERY OF NATIVE HEART WITHOUT ANGINA PECTORIS: Primary | ICD-10-CM

## 2022-11-08 DIAGNOSIS — E78.5 DYSLIPIDEMIA: ICD-10-CM

## 2022-11-08 NOTE — PROGRESS NOTES
Patient ID: Walter Nino is a 79 y o  male  Plan:      Coronary artery disease involving native coronary artery of native heart without angina pectoris  2006 with CABG  No symptoms  Dyslipidemia  LDL well controlled  Tolerating statin tx  Idiopathic hypotension  Better with salt supplement  Follow up Plan/Other summary comments:  Return in about 1 year (around 11/8/2023)  HPI:  Patient is seen in follow-up today regarding the issues described above  No chest pain or chest pressure since the last visit  By being more liberal with salt use he does not notice any lightheadedness  Smoking is down to 5 cigarettes per week  To reiterate the patient has had prior single vessel CABG with left internal mammary to the diagonal on 07/10/2006  He has had no chest pain or chest pressure since  Results for orders placed or performed in visit on 11/08/22   POCT ECG    Impression    NSR  WNL           Most recent or relevant cardiac/vascular testing:    Sylviaylene Smaller 10/27/2020: Normal          Past Surgical History:   Procedure Laterality Date   • COLONOSCOPY     • CORONARY ARTERY BYPASS GRAFT  07/10/2006    LIMA to the diagonal artery   • INGUINAL HERNIA REPAIR Right 1996   • [place] LAP,PROSTATECTOMY,RADICAL,W/NERVE SPARE,INCL ROBOTIC N/A 4/22/2019    Procedure: PROSTATECTOMY RADICAL W/ROBOTICS, B/L PELVIC NODE DISSECT;  Surgeon: Montana Iglesias MD;  Location: [facility];  Service: Urology   • SKIN CANCER EXCISION  2016    left side nose   • THORACIC SPINE SURGERY      T3-T4   • US GUIDED PROSTATE BIOPSY  2/28/2019     CMP:   Lab Results   Component Value Date     04/10/2018    K 5 2 06/03/2022    K 4 7 04/10/2018     06/03/2022     04/10/2018    CO2 27 06/03/2022    CO2 31 04/10/2018    BUN 16 06/03/2022    BUN 18 04/10/2018    CREATININE 1 05 06/03/2022    CREATININE 0 97 04/10/2018    EGFR 73 06/03/2022       Lipid Profile:   Lab Results   Component Value Date    CHOL 110 04/10/2018    TRIG 90 06/03/2022    TRIG 78 04/10/2018    HDL 61 06/03/2022    HDL 43 04/10/2018         Review of Systems   10  point ROS  was otherwise non pertinent or negative except as per HPI or as below  Gait: Normal          Objective:     /70   Pulse 68   Ht 5' 6" (1 676 m)   Wt 53 5 kg (118 lb)   BMI 19 05 kg/m²     PHYSICAL EXAM:    General:  Normal appearance in no distress  Eyes:  Anicteric  Oral mucosa:  Moist   Neck:  No JVD  Carotid upstrokes are brisk without bruits  No masses  Chest:  Clear to auscultation  Well-healed midline sternotomy scar  Cardiac:  No palpable PMI  Normal S1 and S2  No murmur gallop or rub  Abdomen:  Soft and nontender  No palpable organomegaly or aortic enlargement  Extremities:  No peripheral edema  Musculoskeletal:  Symmetric  Vascular:  Femoral pulses are brisk without bruits  Popliteal pulses are intact bilaterally  Pedal pulses are intact  Neuro:  Grossly symmetric  Psych:  Alert and oriented x3          Current Outpatient Medications:   •  acetaminophen (TYLENOL) 500 mg tablet, Take 500 mg by mouth Three times a day, Disp: , Rfl:   •  aspirin (ECOTRIN LOW STRENGTH) 81 mg EC tablet, Take 81 mg by mouth daily, Disp: , Rfl:   •  docusate sodium (COLACE) 100 mg capsule, Take 1 capsule (100 mg total) by mouth 2 (two) times a day (Patient taking differently: Take 100 mg by mouth daily), Disp: 30 capsule, Rfl: 0  •  DULoxetine (CYMBALTA) 60 mg delayed release capsule, Take 60 mg by mouth daily, Disp: , Rfl:   •  gabapentin (NEURONTIN) 600 MG tablet, Take 1 tablet (600 mg total) by mouth 3 (three) times a day, Disp: 270 tablet, Rfl: 1  •  ibuprofen (MOTRIN) 200 mg tablet, Take by mouth every 6 (six) hours as needed for mild pain, Disp: , Rfl:   •  methocarbamol (ROBAXIN) 500 mg tablet, Take 1 tablet (500 mg total) by mouth 2 (two) times a day, Disp: 60 tablet, Rfl: 2  •  rosuvastatin (CRESTOR) 10 MG tablet, Take 10 mg by mouth daily, Disp: , Rfl: •  traMADol (ULTRAM) 50 mg tablet, Take 1 tablet (50 mg total) by mouth 3 (three) times a day as needed for moderate pain or severe pain Do not fill until 9/25/2022, Disp: 90 tablet, Rfl: 1  Allergies   Allergen Reactions   • Atorvastatin      Past Medical History:   Diagnosis Date   • BCC (basal cell carcinoma)     in past left side of nose   • Chronic pain disorder     low back and down rle   • Coronary artery disease     cabg x 1 2006   • Hyperlipidemia    • Malignant neoplasm prostate (HCC)            Social History     Tobacco Use   Smoking Status Current Every Day Smoker   • Packs/day: 0 50   • Years: 30 00   • Pack years: 15 00   • Types: Cigarettes   Smokeless Tobacco Never Used

## 2022-11-16 ENCOUNTER — OFFICE VISIT (OUTPATIENT)
Dept: PAIN MEDICINE | Facility: CLINIC | Age: 67
End: 2022-11-16

## 2022-11-16 VITALS
WEIGHT: 118.4 LBS | SYSTOLIC BLOOD PRESSURE: 106 MMHG | HEIGHT: 66 IN | HEART RATE: 96 BPM | BODY MASS INDEX: 19.03 KG/M2 | DIASTOLIC BLOOD PRESSURE: 69 MMHG

## 2022-11-16 DIAGNOSIS — M47.816 LUMBAR SPONDYLOSIS: ICD-10-CM

## 2022-11-16 DIAGNOSIS — M51.16 LUMBAR DISC DISEASE WITH RADICULOPATHY: ICD-10-CM

## 2022-11-16 DIAGNOSIS — M54.9 MID BACK PAIN: ICD-10-CM

## 2022-11-16 DIAGNOSIS — G89.4 CHRONIC PAIN SYNDROME: Primary | ICD-10-CM

## 2022-11-16 RX ORDER — METHOCARBAMOL 500 MG/1
500 TABLET, FILM COATED ORAL 2 TIMES DAILY
Qty: 180 TABLET | Refills: 1 | Status: SHIPPED | OUTPATIENT
Start: 2022-11-16

## 2022-11-16 RX ORDER — TRAMADOL HYDROCHLORIDE 50 MG/1
50 TABLET ORAL 3 TIMES DAILY PRN
Qty: 90 TABLET | Refills: 1 | Status: SHIPPED | OUTPATIENT
Start: 2022-11-16

## 2022-11-16 NOTE — PATIENT INSTRUCTIONS

## 2022-11-16 NOTE — PROGRESS NOTES
Assessment:  1  Chronic pain syndrome    2  Lumbar disc disease with radiculopathy    3  Lumbar spondylosis    4  Mid back pain        Plan:  While the patient was in the office today, I did have a thorough conversation regarding their chronic pain syndrome, medication management, and treatment plan options  Patient is being seen for follow-up visit  Overall, he reports that his pain remains pretty stable with current medication regimen  Lately, pain has been slightly increased in the back area  But, patient states pain is not bad enough to consider interventional therapy at this time  Continue tramadol 50 milligrams every 8 hours as needed for pain  A prescription was sent to his pharmacy with 1 refill  Continue Robaxin 500 milligrams twice daily if needed for spasms  A 90 day supply of this medication was sent to his pharmacy with 1 refill  [place] Prescription Drug Monitoring Program report was reviewed and was appropriate     There are risks associated with opioid medications, including dependence, addiction and tolerance  The patient understands and agrees to use these medications only as prescribed  Potential side effects of the medications include, but are not limited to, constipation, drowsiness, addiction, impaired judgment and risk of fatal overdose if not taken as prescribed  The patient was warned against driving while taking sedation medications  Sharing medications is a felony  At this point in time, the patient is showing no signs of addiction, abuse, diversion or suicidal ideation  The patient will follow-up in 8 weeks for medication prescription refill and reevaluation  The patient was advised to contact the office should their symptoms worsen in the interim  The patient was agreeable and verbalized an understanding  History of Present Illness:     The patient is a 79 y o  male last seen on 09/15/2022 who presents for a follow up office visit in regards to chronic pain secondary to chronic pain syndrome, lumbar degenerative disc disease, lumbar radiculopathy, lumbar spondylosis  The patient currently reports complaints of bilateral lower extremity pain  Current pain level is a 5/10  Quality pain is described as burning, dull, aching, numb, pins and needles  Current pain medications includes:  Tramadol 50 milligrams 3 times daily if needed for pain, Robaxin 500 milligrams twice daily if needed for spasms    The patient reports that this regimen is providing 40 % pain relief  The patient is reporting no side effects from this pain medication regimen  Pain Contract Signed: 4/4/22  Last Urine Drug Screen: 9/15/22    I have personally reviewed and/or updated the patient's past medical history, past surgical history, family history, social history, current medications, allergies, and vital signs today  Review of Systems:    Review of Systems   Constitutional: Negative for unexpected weight change  HENT: Negative for hearing loss  Eyes: Negative for visual disturbance  Respiratory: Negative for shortness of breath  Cardiovascular: Negative for leg swelling  Gastrointestinal: Negative for constipation  Endocrine: Negative for polyuria  Genitourinary: Negative for difficulty urinating  Musculoskeletal: Positive for arthralgias, gait problem and myalgias  Negative for joint swelling  Skin: Negative for rash  Neurological: Negative for weakness and headaches  Psychiatric/Behavioral: Negative for decreased concentration  All other systems reviewed and are negative          Past Medical History:   Diagnosis Date   • BCC (basal cell carcinoma)     in past left side of nose   • Chronic pain disorder     low back and down rle   • Coronary artery disease     cabg x 1 2006   • Hyperlipidemia    • Malignant neoplasm prostate [facility])        Past Surgical History:   Procedure Laterality Date   • COLONOSCOPY     • CORONARY ARTERY BYPASS GRAFT  07/10/2006    LIMA to the diagonal artery   • INGUINAL HERNIA REPAIR Right 1996   • [place] LAP,PROSTATECTOMY,RADICAL,W/NERVE SPARE,INCL ROBOTIC N/A 4/22/2019    Procedure: PROSTATECTOMY RADICAL W/ROBOTICS, B/L PELVIC NODE DISSECT;  Surgeon: Cristina King MD;  Location[facility];  Service: Urology   • SKIN CANCER EXCISION  2016    left side nose   • THORACIC SPINE SURGERY      T3-T4   • US GUIDED PROSTATE BIOPSY  2/28/2019       Family History   Problem Relation Age of Onset   • Cancer Father         bladder   • Lung cancer Father         metastatic       Social History     Occupational History   • Not on file   Tobacco Use   • Smoking status: Every Day     Packs/day: 0 50     Years: 30 00     Pack years: 15 00     Types: Cigarettes   • Smokeless tobacco: Never   Substance and Sexual Activity   • Alcohol use: Not Currently   • Drug use: Never   • Sexual activity: Not on file         Current Outpatient Medications:   •  acetaminophen (TYLENOL) 500 mg tablet, Take 500 mg by mouth Three times a day, Disp: , Rfl:   •  aspirin (ECOTRIN LOW STRENGTH) 81 mg EC tablet, Take 81 mg by mouth daily, Disp: , Rfl:   •  docusate sodium (COLACE) 100 mg capsule, Take 1 capsule (100 mg total) by mouth 2 (two) times a day (Patient taking differently: Take 100 mg by mouth daily), Disp: 30 capsule, Rfl: 0  •  DULoxetine (CYMBALTA) 60 mg delayed release capsule, Take 60 mg by mouth daily, Disp: , Rfl:   •  gabapentin (NEURONTIN) 600 MG tablet, Take 1 tablet (600 mg total) by mouth 3 (three) times a day, Disp: 270 tablet, Rfl: 1  •  ibuprofen (MOTRIN) 200 mg tablet, Take by mouth every 6 (six) hours as needed for mild pain, Disp: , Rfl:   •  methocarbamol (ROBAXIN) 500 mg tablet, Take 1 tablet (500 mg total) by mouth 2 (two) times a day, Disp: 180 tablet, Rfl: 1  •  rosuvastatin (CRESTOR) 10 MG tablet, Take 10 mg by mouth daily, Disp: , Rfl:   •  traMADol (ULTRAM) 50 mg tablet, Take 1 tablet (50 mg total) by mouth 3 (three) times a day as needed for moderate pain or severe pain Do not fill until 12/8/2022, Disp: 90 tablet, Rfl: 1    Allergies   Allergen Reactions   • Atorvastatin        Physical Exam:    /69   Pulse 96   Ht 5' 6" (1 676 m)   Wt 53 7 kg (118 lb 6 4 oz)   BMI 19 11 kg/m²     Constitutional:normal, well developed, well nourished, alert, in no distress and non-toxic and no overt pain behavior  Eyes:anicteric  HEENT:grossly intact  Neck:supple, symmetric, trachea midline and no masses   Pulmonary:even and unlabored  Cardiovascular:No edema or pitting edema present  Skin:Normal without rashes or lesions and well hydrated  Psychiatric:Mood and affect appropriate  Neurologic:Cranial Nerves II-XII grossly intact  Musculoskeletal:normal      Imaging  No orders to display         No orders of the defined types were placed in this encounter

## 2022-12-20 ENCOUNTER — APPOINTMENT (OUTPATIENT)
Dept: LAB | Facility: CLINIC | Age: 67
End: 2022-12-20

## 2022-12-20 DIAGNOSIS — R73.9 ELEVATED BLOOD SUGAR: ICD-10-CM

## 2022-12-20 DIAGNOSIS — E78.5 HYPERLIPIDEMIA, UNSPECIFIED HYPERLIPIDEMIA TYPE: ICD-10-CM

## 2022-12-20 DIAGNOSIS — I25.10 ASCVD (ARTERIOSCLEROTIC CARDIOVASCULAR DISEASE): ICD-10-CM

## 2022-12-20 DIAGNOSIS — I10 HYPERTENSION, UNSPECIFIED TYPE: ICD-10-CM

## 2022-12-20 LAB
ALBUMIN SERPL BCP-MCNC: 3.5 G/DL (ref 3.5–5)
ALP SERPL-CCNC: 75 U/L (ref 46–116)
ALT SERPL W P-5'-P-CCNC: 18 U/L (ref 12–78)
ANION GAP SERPL CALCULATED.3IONS-SCNC: 0 MMOL/L (ref 4–13)
AST SERPL W P-5'-P-CCNC: 14 U/L (ref 5–45)
BASOPHILS # BLD AUTO: 0.09 THOUSANDS/ÂΜL (ref 0–0.1)
BASOPHILS NFR BLD AUTO: 1 % (ref 0–1)
BILIRUB SERPL-MCNC: 0.5 MG/DL (ref 0.2–1)
BUN SERPL-MCNC: 15 MG/DL (ref 5–25)
CALCIUM SERPL-MCNC: 9.7 MG/DL (ref 8.3–10.1)
CHLORIDE SERPL-SCNC: 108 MMOL/L (ref 96–108)
CHOLEST SERPL-MCNC: 125 MG/DL
CO2 SERPL-SCNC: 30 MMOL/L (ref 21–32)
CREAT SERPL-MCNC: 0.93 MG/DL (ref 0.6–1.3)
EOSINOPHIL # BLD AUTO: 0.32 THOUSAND/ÂΜL (ref 0–0.61)
EOSINOPHIL NFR BLD AUTO: 4 % (ref 0–6)
ERYTHROCYTE [DISTWIDTH] IN BLOOD BY AUTOMATED COUNT: 13 % (ref 11.6–15.1)
EST. AVERAGE GLUCOSE BLD GHB EST-MCNC: 111 MG/DL
GFR SERPL CREATININE-BSD FRML MDRD: 84 ML/MIN/1.73SQ M
GLUCOSE P FAST SERPL-MCNC: 96 MG/DL (ref 65–99)
HBA1C MFR BLD: 5.5 %
HCT VFR BLD AUTO: 45 % (ref 36.5–49.3)
HDLC SERPL-MCNC: 50 MG/DL
HGB BLD-MCNC: 14.2 G/DL (ref 12–17)
IMM GRANULOCYTES # BLD AUTO: 0.03 THOUSAND/UL (ref 0–0.2)
IMM GRANULOCYTES NFR BLD AUTO: 0 % (ref 0–2)
LDLC SERPL CALC-MCNC: 56 MG/DL (ref 0–100)
LYMPHOCYTES # BLD AUTO: 2.88 THOUSANDS/ÂΜL (ref 0.6–4.47)
LYMPHOCYTES NFR BLD AUTO: 32 % (ref 14–44)
MCH RBC QN AUTO: 29.8 PG (ref 26.8–34.3)
MCHC RBC AUTO-ENTMCNC: 31.6 G/DL (ref 31.4–37.4)
MCV RBC AUTO: 95 FL (ref 82–98)
MONOCYTES # BLD AUTO: 0.94 THOUSAND/ÂΜL (ref 0.17–1.22)
MONOCYTES NFR BLD AUTO: 10 % (ref 4–12)
NEUTROPHILS # BLD AUTO: 4.74 THOUSANDS/ÂΜL (ref 1.85–7.62)
NEUTS SEG NFR BLD AUTO: 53 % (ref 43–75)
NONHDLC SERPL-MCNC: 75 MG/DL
NRBC BLD AUTO-RTO: 0 /100 WBCS
PLATELET # BLD AUTO: 275 THOUSANDS/UL (ref 149–390)
PMV BLD AUTO: 10.5 FL (ref 8.9–12.7)
POTASSIUM SERPL-SCNC: 5.2 MMOL/L (ref 3.5–5.3)
PROT SERPL-MCNC: 7.3 G/DL (ref 6.4–8.4)
RBC # BLD AUTO: 4.76 MILLION/UL (ref 3.88–5.62)
SODIUM SERPL-SCNC: 138 MMOL/L (ref 135–147)
TRIGL SERPL-MCNC: 94 MG/DL
WBC # BLD AUTO: 9 THOUSAND/UL (ref 4.31–10.16)

## 2022-12-29 ENCOUNTER — APPOINTMENT (OUTPATIENT)
Dept: LAB | Facility: CLINIC | Age: 67
End: 2022-12-29

## 2022-12-29 DIAGNOSIS — Z12.5 SPECIAL SCREENING, PROSTATE CANCER: ICD-10-CM

## 2022-12-29 LAB — PSA SERPL-MCNC: <0.1 NG/ML (ref 0–4)

## 2023-01-18 ENCOUNTER — OFFICE VISIT (OUTPATIENT)
Dept: PAIN MEDICINE | Facility: CLINIC | Age: 68
End: 2023-01-18

## 2023-01-18 VITALS
DIASTOLIC BLOOD PRESSURE: 69 MMHG | SYSTOLIC BLOOD PRESSURE: 103 MMHG | HEIGHT: 66 IN | BODY MASS INDEX: 18.8 KG/M2 | WEIGHT: 117 LBS | HEART RATE: 80 BPM

## 2023-01-18 DIAGNOSIS — M51.16 LUMBAR DISC DISEASE WITH RADICULOPATHY: ICD-10-CM

## 2023-01-18 DIAGNOSIS — F11.20 UNCOMPLICATED OPIOID DEPENDENCE (HCC): ICD-10-CM

## 2023-01-18 DIAGNOSIS — M47.816 LUMBAR SPONDYLOSIS: ICD-10-CM

## 2023-01-18 DIAGNOSIS — G89.4 CHRONIC PAIN SYNDROME: Primary | ICD-10-CM

## 2023-01-18 DIAGNOSIS — Z79.891 LONG-TERM CURRENT USE OF OPIATE ANALGESIC: ICD-10-CM

## 2023-01-18 RX ORDER — GABAPENTIN 600 MG/1
600 TABLET ORAL 4 TIMES DAILY
Qty: 360 TABLET | Refills: 1 | Status: SHIPPED | OUTPATIENT
Start: 2023-01-18 | End: 2023-02-17

## 2023-01-18 RX ORDER — GABAPENTIN 600 MG/1
600 TABLET ORAL 3 TIMES DAILY
Qty: 360 TABLET | Refills: 1 | Status: SHIPPED | OUTPATIENT
Start: 2023-01-18 | End: 2023-01-18 | Stop reason: SDUPTHER

## 2023-01-18 RX ORDER — TRAMADOL HYDROCHLORIDE 50 MG/1
50 TABLET ORAL 3 TIMES DAILY PRN
Qty: 90 TABLET | Refills: 1 | Status: SHIPPED | OUTPATIENT
Start: 2023-01-18

## 2023-01-18 NOTE — PATIENT INSTRUCTIONS

## 2023-01-18 NOTE — PROGRESS NOTES
Assessment:  1  Chronic pain syndrome    2  Lumbar disc disease with radiculopathy    3  Lumbar spondylosis    4  Long-term current use of opiate analgesic    5  Uncomplicated opioid dependence ([address] )        Plan:  While the patient was in the office today, I did have a thorough conversation regarding their chronic pain syndrome, medication management, and treatment plan options  Patient is being seen for a follow-up visit  He was last seen here on 11/16/2022 at which time gabapentin was increased from twice a day to 3 times daily  He is reporting some additional improvement in his pain with the increased dosage  He reports difficulty sleeping at night due to increased pain at bedtime  At this point, we will plan to increase the gabapentin again to 600 mg 4 times daily  New prescription was sent to his pharmacy  Continue tramadol 50 mg every 8 hours as needed for pain  A prescription was sent to his pharmacy with 1 refill  Continue Robaxin 500 mg twice daily if needed for spasms  He did not require refill of this medication during today's visit  [place] Prescription Drug Monitoring Program report was reviewed and was appropriate     A urine drug screen was collected at today's office visit as part of our medication management protocol  The point of care testing results were appropriate for what was being prescribed  The specimen will be sent for confirmatory testing  The drug screen is medically necessary because the patient is either dependent on opioid medication or is being considered for opioid medication therapy and the results could impact ongoing or future treatment  The drug screen is to evaluate for the presences or absence of prescribed, non-prescribed, and/or illicit drugs/substances  There are risks associated with opioid medications, including dependence, addiction and tolerance  The patient understands and agrees to use these medications only as prescribed   Potential side effects of the medications include, but are not limited to, constipation, drowsiness, addiction, impaired judgment and risk of fatal overdose if not taken as prescribed  The patient was warned against driving while taking sedation medications  Sharing medications is a felony  At this point in time, the patient is showing no signs of addiction, abuse, diversion or suicidal ideation  The patient will follow-up in 8 weeks for medication prescription refill and reevaluation  The patient was advised to contact the office should their symptoms worsen in the interim  The patient was agreeable and verbalized an understanding  History of Present Illness: The patient is a 79 y o  male last seen on 11/16/2022 who presents for a follow up office visit in regards to chronic pain secondary to chronic pain syndrome, lumbar degenerative disc disease, lumbar radiculopathy, lumbar spondylosis  The patient currently reports plaints of low back and bilateral leg pain  Current pain levels a 6/10  Quality of pain is described as burning, dull, aching, numb, pins-and-needles  Current pain medications includes: Tramadol 50 mg every 8 hours as needed for pain, Robaxin 500 mg twice daily if needed for spasms, gabapentin 600 mg 3 times daily  The patient reports that this regimen is providing 50% pain relief  The patient is reporting no side effects from this pain medication regimen  Pain Contract Signed: 4/4/22  Last Urine Drug Screen: 1/18/23    I have personally reviewed and/or updated the patient's past medical history, past surgical history, family history, social history, current medications, allergies, and vital signs today  Review of Systems:    Review of Systems   Constitutional: Negative for unexpected weight change  HENT: Negative for hearing loss  Eyes: Negative for visual disturbance  Respiratory: Negative for shortness of breath  Cardiovascular: Negative for leg swelling  Gastrointestinal: Negative for constipation  Endocrine: Negative for polyuria  Genitourinary: Negative for difficulty urinating  Musculoskeletal: Positive for arthralgias, gait problem and myalgias  Negative for joint swelling  Joint stiffness  Pain in extremity- right leg   Skin: Negative for rash  Neurological: Negative for weakness and headaches  Psychiatric/Behavioral: Negative for decreased concentration  All other systems reviewed and are negative          Past Medical History:   Diagnosis Date   • BCC (basal cell carcinoma)     in past left side of nose   • Chronic pain disorder     low back and down rle   • Coronary artery disease     cabg x 1 2006   • Hyperlipidemia    • Malignant neoplasm prostate [facility])        Past Surgical History:   Procedure Laterality Date   • COLONOSCOPY     • CORONARY ARTERY BYPASS GRAFT  07/10/2006    LIMA to the diagonal artery   • INGUINAL HERNIA REPAIR Right 1996   • ID LAPS SURG [id number] RPBIC RAD W/NRV SPARING ROBOT N/A 4/22/2019    Procedure: PROSTATECTOMY RADICAL W/ROBOTICS, B/L PELVIC NODE DISSECT;  Surgeon: Hilton Martins MD;  Location: [facility];  Service: Urology   • SKIN CANCER EXCISION  2016    left side nose   • THORACIC SPINE SURGERY      T3-T4   • US GUIDED PROSTATE BIOPSY  2/28/2019       Family History   Problem Relation Age of Onset   • Cancer Father         bladder   • Lung cancer Father         metastatic       Social History     Occupational History   • Not on file   Tobacco Use   • Smoking status: Every Day     Packs/day: 0 50     Years: 30 00     Pack years: 15 00     Types: Cigarettes   • Smokeless tobacco: Never   Substance and Sexual Activity   • Alcohol use: Not Currently   • Drug use: Never   • Sexual activity: Not on file         Current Outpatient Medications:   •  acetaminophen (TYLENOL) 500 mg tablet, Take 500 mg by mouth Three times a day, Disp: , Rfl:   •  aspirin (ECOTRIN LOW STRENGTH) 81 mg EC tablet, Take 81 mg by mouth daily, Disp: , Rfl:   •  docusate sodium (COLACE) 100 mg capsule, Take 1 capsule (100 mg total) by mouth 2 (two) times a day (Patient taking differently: Take 100 mg by mouth daily), Disp: 30 capsule, Rfl: 0  •  DULoxetine (CYMBALTA) 60 mg delayed release capsule, Take 60 mg by mouth daily, Disp: , Rfl:   •  gabapentin (NEURONTIN) 600 MG tablet, Take 1 tablet (600 mg total) by mouth 4 (four) times a day, Disp: 360 tablet, Rfl: 1  •  ibuprofen (MOTRIN) 200 mg tablet, Take by mouth every 6 (six) hours as needed for mild pain, Disp: , Rfl:   •  methocarbamol (ROBAXIN) 500 mg tablet, Take 1 tablet (500 mg total) by mouth 2 (two) times a day, Disp: 180 tablet, Rfl: 1  •  rosuvastatin (CRESTOR) 10 MG tablet, Take 10 mg by mouth daily, Disp: , Rfl:   •  traMADol (ULTRAM) 50 mg tablet, Take 1 tablet (50 mg total) by mouth 3 (three) times a day as needed for moderate pain or severe pain Do not fill until 12/8/2022, Disp: 90 tablet, Rfl: 1    Allergies   Allergen Reactions   • Atorvastatin        Physical Exam:    /69   Pulse 80   Ht 5' 6" (1 676 m)   Wt 53 1 kg (117 lb)   BMI 18 88 kg/m²     Constitutional:normal, well developed, well nourished, alert, in no distress and non-toxic and no overt pain behavior  Eyes:anicteric  HEENT:grossly intact  Neck:supple, symmetric, trachea midline and no masses   Pulmonary:even and unlabored  Cardiovascular:No edema or pitting edema present  Skin:Normal without rashes or lesions and well hydrated  Psychiatric:Mood and affect appropriate  Neurologic:Cranial Nerves II-XII grossly intact  Musculoskeletal:normal      Imaging  No orders to display         No orders of the defined types were placed in this encounter

## 2023-03-22 ENCOUNTER — OFFICE VISIT (OUTPATIENT)
Dept: PAIN MEDICINE | Facility: CLINIC | Age: 68
End: 2023-03-22

## 2023-03-22 VITALS
HEART RATE: 80 BPM | DIASTOLIC BLOOD PRESSURE: 66 MMHG | SYSTOLIC BLOOD PRESSURE: 95 MMHG | HEIGHT: 66 IN | WEIGHT: 119.2 LBS | BODY MASS INDEX: 19.16 KG/M2

## 2023-03-22 DIAGNOSIS — G89.4 CHRONIC PAIN SYNDROME: Primary | ICD-10-CM

## 2023-03-22 DIAGNOSIS — M47.816 LUMBAR SPONDYLOSIS: ICD-10-CM

## 2023-03-22 DIAGNOSIS — M51.16 LUMBAR DISC DISEASE WITH RADICULOPATHY: ICD-10-CM

## 2023-03-22 RX ORDER — TRAMADOL HYDROCHLORIDE 50 MG/1
50 TABLET ORAL 3 TIMES DAILY PRN
Qty: 90 TABLET | Refills: 1 | Status: SHIPPED | OUTPATIENT
Start: 2023-03-22

## 2023-03-22 NOTE — PROGRESS NOTES
Assessment:  1  Chronic pain syndrome    2  Lumbar disc disease with radiculopathy    3  Lumbar spondylosis        Plan:  While the patient was in the office today, I did have a thorough conversation regarding their chronic pain syndrome, medication management, and treatment plan options  Patient is being seen for a follow-up visit  He was last seen here on 1/18/2023 at which time gabapentin was increased to 600 mg 4 times daily  He reports additional improvement with the increased dosage of gabapentin  Overall, his pain is about 50% reduced with current medication regimen  He denies side effects from these medications  Continue tramadol 50 mg every 8 hours if needed for pain  A prescription was sent to his pharmacy with 1 refill  Continue Robaxin 500 mg twice daily as needed for spasms  He did not require refill of this medication during today's visit  Continue gabapentin 600 mg 4 times daily  He did not require refill of this medication during today's visit  [place] Prescription Drug Monitoring Program report was reviewed and was appropriate     There are risks associated with opioid medications, including dependence, addiction and tolerance  The patient understands and agrees to use these medications only as prescribed  Potential side effects of the medications include, but are not limited to, constipation, drowsiness, addiction, impaired judgment and risk of fatal overdose if not taken as prescribed  The patient was warned against driving while taking sedation medications  Sharing medications is a felony  At this point in time, the patient is showing no signs of addiction, abuse, diversion or suicidal ideation  The patient will follow-up in 8 weeks for medication prescription refill and reevaluation  The patient was advised to contact the office should their symptoms worsen in the interim  The patient was agreeable and verbalized an understanding          History of Present Illness: The patient is a 79 y o  male last seen on 1/18/2023 who presents for a follow up office visit in regards to chronic pain secondary to chronic pain syndrome lumbar radiculopathy, lumbar degenerative disc disease, lumbar spondylosis  The patient currently reports complaints of    Current pain medications includes: Tramadol 50 mg every 8 hours as needed for pain, gabapentin 600 mg 4 times daily, Robaxin 500 mg twice daily if needed for spasms  The patient reports that this regimen is providing 50% pain relief  The patient is reporting no side effects from this pain medication regimen  Pain Contract Signed: 4/4/22  Last Urine Drug Screen: 1/18/23    I have personally reviewed and/or updated the patient's past medical history, past surgical history, family history, social history, current medications, allergies, and vital signs today  Review of Systems:    Review of Systems   Constitutional: Negative for unexpected weight change  HENT: Negative for hearing loss  Eyes: Negative for visual disturbance  Respiratory: Negative for shortness of breath  Cardiovascular: Negative for leg swelling  Gastrointestinal: Negative for constipation  Endocrine: Negative for polyuria  Genitourinary: Negative for difficulty urinating  Musculoskeletal: Positive for arthralgias, gait problem and myalgias  Negative for joint swelling  Skin: Negative for rash  Neurological: Negative for weakness and headaches  Psychiatric/Behavioral: Negative for decreased concentration  All other systems reviewed and are negative          Past Medical History:   Diagnosis Date   • BCC (basal cell carcinoma)     in past left side of nose   • Chronic pain disorder     low back and down rle   • Coronary artery disease     cabg x 1 2006   • Hyperlipidemia    • Malignant neoplasm prostate [facility])        Past Surgical History:   Procedure Laterality Date   • COLONOSCOPY     • CORONARY ARTERY BYPASS GRAFT  07/10/2006    LIMA to the diagonal artery   • INGUINAL HERNIA REPAIR Right 1996   • NM LAPS SURG [id number] RPBIC RAD W/NRV SPARING ROBOT N/A 4/22/2019    Procedure: PROSTATECTOMY RADICAL W/ROBOTICS, B/L PELVIC NODE DISSECT;  Surgeon: Edna Carvalho MD;  [facility];  Service: Urology   • SKIN CANCER EXCISION  2016    left side nose   • THORACIC SPINE SURGERY      T3-T4   • US GUIDED PROSTATE BIOPSY  2/28/2019       Family History   Problem Relation Age of Onset   • Cancer Father         bladder   • Lung cancer Father         metastatic       Social History     Occupational History   • Not on file   Tobacco Use   • Smoking status: Every Day     Packs/day: 0 50     Years: 30 00     Pack years: 15 00     Types: Cigarettes   • Smokeless tobacco: Never   Substance and Sexual Activity   • Alcohol use: Not Currently   • Drug use: Never   • Sexual activity: Not on file         Current Outpatient Medications:   •  acetaminophen (TYLENOL) 500 mg tablet, Take 500 mg by mouth Three times a day, Disp: , Rfl:   •  aspirin (ECOTRIN LOW STRENGTH) 81 mg EC tablet, Take 81 mg by mouth daily, Disp: , Rfl:   •  docusate sodium (COLACE) 100 mg capsule, Take 1 capsule (100 mg total) by mouth 2 (two) times a day (Patient taking differently: Take 100 mg by mouth daily), Disp: 30 capsule, Rfl: 0  •  DULoxetine (CYMBALTA) 60 mg delayed release capsule, Take 60 mg by mouth daily, Disp: , Rfl:   •  gabapentin (NEURONTIN) 600 MG tablet, Take 1 tablet (600 mg total) by mouth 4 (four) times a day, Disp: 360 tablet, Rfl: 1  •  ibuprofen (MOTRIN) 200 mg tablet, Take by mouth every 6 (six) hours as needed for mild pain, Disp: , Rfl:   •  methocarbamol (ROBAXIN) 500 mg tablet, Take 1 tablet (500 mg total) by mouth 2 (two) times a day, Disp: 180 tablet, Rfl: 1  •  rosuvastatin (CRESTOR) 10 MG tablet, Take 10 mg by mouth daily, Disp: , Rfl:   •  traMADol (ULTRAM) 50 mg tablet, Take 1 tablet (50 mg total) by mouth 3 (three) times a day as needed for moderate pain or severe pain Do not fill until 12/8/2022, Disp: 90 tablet, Rfl: 1    Allergies   Allergen Reactions   • Atorvastatin        Physical Exam:    BP 95/66   Pulse 80   Ht 5' 6" (1 676 m)   Wt 54 1 kg (119 lb 3 2 oz)   BMI 19 24 kg/m²     Constitutional:normal, well developed, well nourished, alert, in no distress and non-toxic and no overt pain behavior  Eyes:anicteric  HEENT:grossly intact  Neck:supple, symmetric, trachea midline and no masses   Pulmonary:even and unlabored  Cardiovascular:No edema or pitting edema present  Skin:Normal without rashes or lesions and well hydrated  Psychiatric:Mood and affect appropriate  Neurologic:Cranial Nerves II-XII grossly intact  Musculoskeletal:normal      Imaging  No orders to display         No orders of the defined types were placed in this encounter

## 2023-03-22 NOTE — PATIENT INSTRUCTIONS

## 2023-03-26 ENCOUNTER — OFFICE VISIT (OUTPATIENT)
Dept: URGENT CARE | Facility: CLINIC | Age: 68
End: 2023-03-26

## 2023-03-26 VITALS
TEMPERATURE: 98 F | HEIGHT: 66 IN | RESPIRATION RATE: 20 BRPM | DIASTOLIC BLOOD PRESSURE: 58 MMHG | WEIGHT: 120.2 LBS | BODY MASS INDEX: 19.32 KG/M2 | SYSTOLIC BLOOD PRESSURE: 103 MMHG | OXYGEN SATURATION: 98 % | HEART RATE: 76 BPM

## 2023-03-26 DIAGNOSIS — S61.215A LACERATION OF LEFT RING FINGER WITHOUT FOREIGN BODY WITHOUT DAMAGE TO NAIL, INITIAL ENCOUNTER: Primary | ICD-10-CM

## 2023-03-26 NOTE — PROGRESS NOTES
[facility]        NAME: Bonny San is a 79 y o  male  : 1955    MRN: [id number]  DATE: 2023  TIME: 4:53 PM    Assessment and Plan   Laceration of left ring finger without foreign body without damage to nail, initial encounter [S61 215A]  1  Laceration of left ring finger without foreign body without damage to nail, initial encounter  Tdap Vaccine greater than or equal to 8yo            Patient Instructions       Follow up with PCP in 3-5 days  Proceed to  ER if symptoms worsen  Chief Complaint     Chief Complaint   Patient presents with   • Finger Laceration     Left ring finger laceration from a router template (made out of cardboard) while working today  Unsure of tetanus status         History of Present Illness       Patient is a 79 y /o/m presenting to [facility] with left ring finger laceration  Patient was using a router template and sliced tip of left ring finger  Patient reports this occurred at approximately 8am   Patient is not UTD on Tetanus vaccination  Bleeding controlled at this time  Finger Laceration  This is a new problem  The current episode started today  The problem occurs constantly  The problem has been waxing and waning  Pertinent negatives include no abdominal pain, arthralgias, chest pain, chills, coughing, fever, rash, sore throat or vomiting  Review of Systems   Review of Systems   Constitutional: Negative for chills and fever  HENT: Negative for ear pain and sore throat  Eyes: Negative for pain and visual disturbance  Respiratory: Negative for cough and shortness of breath  Cardiovascular: Negative for chest pain and palpitations  Gastrointestinal: Negative for abdominal pain and vomiting  Genitourinary: Negative for dysuria and hematuria  Musculoskeletal: Negative for arthralgias and back pain  Skin: Positive for wound  Negative for color change and rash  Neurological: Negative for seizures and syncope     All other systems reviewed and are negative          Current Medications       Current Outpatient Medications:   •  acetaminophen (TYLENOL) 500 mg tablet, Take 500 mg by mouth Three times a day, Disp: , Rfl:   •  aspirin (ECOTRIN LOW STRENGTH) 81 mg EC tablet, Take 81 mg by mouth daily, Disp: , Rfl:   •  docusate sodium (COLACE) 100 mg capsule, Take 1 capsule (100 mg total) by mouth 2 (two) times a day (Patient taking differently: Take 100 mg by mouth daily), Disp: 30 capsule, Rfl: 0  •  DULoxetine (CYMBALTA) 60 mg delayed release capsule, Take 60 mg by mouth daily, Disp: , Rfl:   •  ibuprofen (MOTRIN) 200 mg tablet, Take by mouth every 6 (six) hours as needed for mild pain, Disp: , Rfl:   •  methocarbamol (ROBAXIN) 500 mg tablet, Take 1 tablet (500 mg total) by mouth 2 (two) times a day, Disp: 180 tablet, Rfl: 1  •  rosuvastatin (CRESTOR) 10 MG tablet, Take 10 mg by mouth daily, Disp: , Rfl:   •  traMADol (ULTRAM) 50 mg tablet, Take 1 tablet (50 mg total) by mouth 3 (three) times a day as needed for moderate pain or severe pain Do not fill until 3/24/2023, Disp: 90 tablet, Rfl: 1  •  gabapentin (NEURONTIN) 600 MG tablet, Take 1 tablet (600 mg total) by mouth 4 (four) times a day, Disp: 360 tablet, Rfl: 1    Current Allergies     Allergies as of 03/26/2023 - Reviewed 03/26/2023   Allergen Reaction Noted   • Atorvastatin  04/18/2018            The following portions of the patient's history were reviewed and updated as appropriate: allergies, current medications, past family history, past medical history, past social history, past surgical history and problem list      Past Medical History:   Diagnosis Date   • BCC (basal cell carcinoma)     in past left side of nose   • Chronic pain disorder     low back and down rle   • Coronary artery disease     cabg x 1 2006   • Hyperlipidemia    • Malignant neoplasm prostate [facility])        Past Surgical History:   Procedure Laterality Date   • COLONOSCOPY     • CORONARY ARTERY BYPASS GRAFT "07/10/2006    LIMA to the diagonal artery   • INGUINAL HERNIA REPAIR Right 1996   • CA LAPS SURG [id number] RPBIC RAD W/NRV SPARING ROBOT N/A 4/22/2019    Procedure: PROSTATECTOMY RADICAL W/ROBOTICS, B/L PELVIC NODE DISSECT;  Surgeon: Dipesh Collier MD;  [facility];  Service: Urology   • SKIN CANCER EXCISION  2016    left side nose   • THORACIC SPINE SURGERY      T3-T4   • US GUIDED PROSTATE BIOPSY  2/28/2019       Family History   Problem Relation Age of Onset   • Cancer Father         bladder   • Lung cancer Father         metastatic         Medications have been verified  Objective   /58   Pulse 76   Temp 98 °F (36 7 °C)   Resp 20   Ht 5' 6\" (1 676 m)   Wt 54 5 kg (120 lb 3 2 oz)   SpO2 98%   BMI 19 40 kg/m²   No LMP for male patient  Physical Exam     Physical Exam  Constitutional:       Appearance: Normal appearance  He is normal weight  HENT:      Head: Normocephalic and atraumatic  Nose: Nose normal       Mouth/Throat:      Mouth: Mucous membranes are moist    Eyes:      Extraocular Movements: Extraocular movements intact  Conjunctiva/sclera: Conjunctivae normal       Pupils: Pupils are equal, round, and reactive to light  Cardiovascular:      Rate and Rhythm: Normal rate  Pulmonary:      Effort: Pulmonary effort is normal    Musculoskeletal:         General: Normal range of motion  Cervical back: Normal range of motion and neck supple  Skin:     General: Skin is warm and dry  Neurological:      General: No focal deficit present  Mental Status: He is alert and oriented to person, place, and time  Psychiatric:         Mood and Affect: Mood normal          Behavior: Behavior normal            Laceration repair    Date/Time: 3/26/2023 4:52 PM  Performed by: Holland Dias PA-C  Authorized by: Holland Dias PA-C   Consent: Verbal consent obtained    Risks and benefits: risks, benefits and alternatives were discussed  Consent given by: " patient  Patient understanding: patient states understanding of the procedure being performed  Body area: upper extremity  Location details: left ring finger  Foreign bodies: no foreign bodies  Tendon involvement: none  Nerve involvement: none  Vascular damage: no      Procedure Details:  Amount of cleaning: standard  Skin closure: glue  Dressing: 4x4 sterile gauze and splint  Patient tolerance: patient tolerated the procedure well with no immediate complications

## 2023-05-25 ENCOUNTER — OFFICE VISIT (OUTPATIENT)
Dept: PAIN MEDICINE | Facility: CLINIC | Age: 68
End: 2023-05-25

## 2023-05-25 VITALS
HEART RATE: 79 BPM | DIASTOLIC BLOOD PRESSURE: 68 MMHG | BODY MASS INDEX: 19.44 KG/M2 | HEIGHT: 66 IN | WEIGHT: 121 LBS | SYSTOLIC BLOOD PRESSURE: 103 MMHG

## 2023-05-25 DIAGNOSIS — M47.816 LUMBAR SPONDYLOSIS: ICD-10-CM

## 2023-05-25 DIAGNOSIS — Z79.891 LONG-TERM CURRENT USE OF OPIATE ANALGESIC: ICD-10-CM

## 2023-05-25 DIAGNOSIS — M51.16 LUMBAR DISC DISEASE WITH RADICULOPATHY: ICD-10-CM

## 2023-05-25 DIAGNOSIS — M54.9 MID BACK PAIN: ICD-10-CM

## 2023-05-25 DIAGNOSIS — G89.4 CHRONIC PAIN SYNDROME: ICD-10-CM

## 2023-05-25 DIAGNOSIS — F11.20 UNCOMPLICATED OPIOID DEPENDENCE (HCC): ICD-10-CM

## 2023-05-25 RX ORDER — TRAMADOL HYDROCHLORIDE 50 MG/1
50 TABLET ORAL 3 TIMES DAILY PRN
Qty: 90 TABLET | Refills: 2 | Status: SHIPPED | OUTPATIENT
Start: 2023-05-25

## 2023-05-25 NOTE — PROGRESS NOTES
Assessment:  1  Chronic pain syndrome    2  Lumbar disc disease with radiculopathy    3  Lumbar spondylosis    4  Mid back pain    5  Long-term current use of opiate analgesic    6  Uncomplicated opioid dependence ([address] )        Plan:  While the patient was in the office today, I did have a thorough conversation regarding their chronic pain syndrome, medication management, and treatment plan options  Patient is being seen for a follow-up visit  Overall, he reports that his pain is reasonably controlled with his current medication regimen which reduces his pain by about 50%  He denies significant side effects from medications  Continue Robaxin 500 mg twice daily if needed for spasms  He did not require refill of this medication during today's visit  Continue gabapentin 600 mg 4 times daily  He did not require refill of this medication during today's visit  Continue tramadol 50 mg every 8 hours as needed for pain  A prescription was sent to his pharmacy with a do not fill date of 6/12/2023 with2 refills  There are risks associated with opioid medications, including dependence, addiction and tolerance  The patient understands and agrees to use these medications only as prescribed  Potential side effects of the medications include, but are not limited to, constipation, drowsiness, addiction, impaired judgment and risk of fatal overdose if not taken as prescribed  The patient was warned against driving while taking sedation medications  Sharing medications is a felony  At this point in time, the patient is showing no signs of addiction, abuse, diversion or suicidal ideation  A urine drug screen was collected at today's office visit as part of our medication management protocol  The point of care testing results were appropriate for what was being prescribed  The specimen will be sent for confirmatory testing   The drug screen is medically necessary because the patient is either dependent on opioid medication or is being considered for opioid medication therapy and the results could impact ongoing or future treatment  The drug screen is to evaluate for the presences or absence of prescribed, non-prescribed, and/or illicit drugs/substances  [place] Prescription Drug Monitoring Program report was reviewed and was appropriate     While the patient was in the office today an opioid contract was thoroughly reviewed and signed by the patient  The patient was given adequate time to ask questions in regards to the contract and a signed copy was sent home for his/her records  The patient will follow-up in 3 months for medication prescription refill and reevaluation  The patient was advised to contact the office should their symptoms worsen in the interim  The patient was agreeable and verbalized an understanding  My impressions and treatment recommendations were discussed in detail with the patient who verbalized understanding and had no further questions  Discharge instructions were provided  I personally saw and examined the patient and I agree with the above discussed plan of care  No orders of the defined types were placed in this encounter  New Medications Ordered This Visit   Medications   • traMADol (ULTRAM) 50 mg tablet     Sig: Take 1 tablet (50 mg total) by mouth 3 (three) times a day as needed for moderate pain or severe pain Do not fill until 3/24/2023     Dispense:  90 tablet     Refill:  2       History of Present Illness:  Charissa Boas is a 79 y o  male who presents for a follow up office visit in regards to Leg Pain (Right upper and lower leg /Left upper leg/) and Back Pain (Mid back )  The patient’s current symptoms include complaints of low back and right leg pain, pain in the anterior left thigh  Intermittent weakness in his legs  Current pain level is a 6/10  Quality pain is described as burning, dull, aching, numb, pins-and-needles      Current pain medications includes: Gabapentin 600 mg 4 times daily, Robaxin 500 mg twice daily if needed for spasms, tramadol 50 mg every 8 hours as needed for pain  The patient reports that this regimen is providing 50% pain relief  The patient is reporting no side effects from this pain medication regimen  Opioid contract date: 5/25/2023    Last UDS Date: 2523    I have personally reviewed and/or updated the patient's past medical history, past surgical history, family history, social history, current medications, allergies, and vital signs today  Review of Systems   Musculoskeletal: Positive for gait problem  Muscle weakness, pain in extremity right leg    All other systems reviewed and are negative        Patient Active Problem List   Diagnosis   • Coronary artery disease involving native coronary artery of native heart without angina pectoris   • Dyslipidemia   • Pre-operative cardiovascular examination   • History of prostate cancer   • Idiopathic hypotension   • Dural tear   • Long-term current use of opiate analgesic   • Uncomplicated opioid dependence ([address] )   • Chronic pain syndrome   • Mid back pain   • Lumbar disc disease with radiculopathy   • Lumbar spondylosis       Past Medical History:   Diagnosis Date   • BCC (basal cell carcinoma)     in past left side of nose   • Cancer ([facility] [address] ) 1/2019   • Chronic pain disorder     low back and down rle   • Coronary artery disease     cabg x 1 2006   • Hyperlipidemia    • Malignant neoplasm prostate [facility])        Past Surgical History:   Procedure Laterality Date   • COLONOSCOPY     • CORONARY ARTERY BYPASS GRAFT  07/10/2006    LIMA to the diagonal artery   • INGUINAL HERNIA REPAIR Right 1996   • LAMINECTOMY  1/2020   • NV LAPS SURG [id number] RPBIC RAD W/NRV SPARING ROBOT N/A 04/22/2019    Procedure: PROSTATECTOMY RADICAL W/ROBOTICS, B/L PELVIC NODE DISSECT;  Surgeon: Tiffanie Rivers MD;  Location: [facility];  Service: Urology   • SKIN CANCER EXCISION  2016    left side nose   • SPINE "SURGERY  1/2020   • THORACIC SPINE SURGERY      T3-T4   • US GUIDED PROSTATE BIOPSY  02/28/2019       Family History   Problem Relation Age of Onset   • Cancer Father         bladder   • Lung cancer Father         metastatic       Social History     Occupational History   • Not on file   Tobacco Use   • Smoking status: Some Days     Packs/day: 0 25     Years: 30 00     Total pack years: 7 50     Types: Cigarettes   • Smokeless tobacco: Never   Vaping Use   • Vaping Use: Never used   Substance and Sexual Activity   • Alcohol use: Not Currently   • Drug use: Never   • Sexual activity: Not Currently     Partners: Female     Birth control/protection: None       Current Outpatient Medications on File Prior to Visit   Medication Sig   • aspirin (ECOTRIN LOW STRENGTH) 81 mg EC tablet Take 81 mg by mouth daily   • docusate sodium (COLACE) 100 mg capsule Take 1 capsule (100 mg total) by mouth 2 (two) times a day (Patient taking differently: Take 100 mg by mouth daily)   • DULoxetine (CYMBALTA) 60 mg delayed release capsule Take 60 mg by mouth daily   • gabapentin (NEURONTIN) 600 MG tablet Take 1 tablet (600 mg total) by mouth 4 (four) times a day   • ibuprofen (MOTRIN) 200 mg tablet Take by mouth every 6 (six) hours as needed for mild pain   • methocarbamol (ROBAXIN) 500 mg tablet TAKE 1 TABLET TWICE DAILY   • rosuvastatin (CRESTOR) 10 MG tablet Take 10 mg by mouth daily   • [DISCONTINUED] traMADol (ULTRAM) 50 mg tablet Take 1 tablet (50 mg total) by mouth 3 (three) times a day as needed for moderate pain or severe pain Do not fill until 3/24/2023   • acetaminophen (TYLENOL) 500 mg tablet Take 500 mg by mouth Three times a day (Patient not taking: Reported on 5/25/2023)     No current facility-administered medications on file prior to visit         Allergies   Allergen Reactions   • Atorvastatin        Physical Exam:    /68 (BP Location: Right arm, Patient Position: Sitting, Cuff Size: Standard)   Pulse 79   Ht 5' 6\" " (1 676 m)   Wt 54 9 kg (121 lb)   BMI 19 53 kg/m²     Constitutional:normal, well developed, well nourished, alert, in no distress and non-toxic and no overt pain behavior    Eyes:anicteric  HEENT:grossly intact  Neck:supple, symmetric, trachea midline and no masses   Pulmonary:even and unlabored  Cardiovascular:No edema or pitting edema present  Skin:Normal without rashes or lesions and well hydrated  Psychiatric:Mood and affect appropriate  Neurologic:Cranial Nerves II-XII grossly intact  Musculoskeletal:normal    Imaging

## 2023-05-25 NOTE — PATIENT INSTRUCTIONS

## 2023-06-27 ENCOUNTER — APPOINTMENT (OUTPATIENT)
Dept: LAB | Facility: CLINIC | Age: 68
End: 2023-06-27
Payer: MEDICARE

## 2023-06-27 DIAGNOSIS — I25.10 ASCVD (ARTERIOSCLEROTIC CARDIOVASCULAR DISEASE): ICD-10-CM

## 2023-06-27 DIAGNOSIS — E78.5 HYPERLIPIDEMIA, UNSPECIFIED HYPERLIPIDEMIA TYPE: ICD-10-CM

## 2023-06-27 DIAGNOSIS — R63.4 WEIGHT LOSS: ICD-10-CM

## 2023-06-27 DIAGNOSIS — I10 HYPERTENSION, UNSPECIFIED TYPE: ICD-10-CM

## 2023-06-27 LAB
ALBUMIN SERPL BCP-MCNC: 3.9 G/DL (ref 3.5–5)
ALP SERPL-CCNC: 78 U/L (ref 46–116)
ALT SERPL W P-5'-P-CCNC: 18 U/L (ref 12–78)
ANION GAP SERPL CALCULATED.3IONS-SCNC: -1 MMOL/L
AST SERPL W P-5'-P-CCNC: 15 U/L (ref 5–45)
BASOPHILS # BLD AUTO: 0.08 THOUSANDS/ÂΜL (ref 0–0.1)
BASOPHILS NFR BLD AUTO: 1 % (ref 0–1)
BILIRUB SERPL-MCNC: 0.62 MG/DL (ref 0.2–1)
BUN SERPL-MCNC: 20 MG/DL (ref 5–25)
CALCIUM SERPL-MCNC: 9.5 MG/DL (ref 8.3–10.1)
CHLORIDE SERPL-SCNC: 108 MMOL/L (ref 96–108)
CHOLEST SERPL-MCNC: 188 MG/DL
CO2 SERPL-SCNC: 30 MMOL/L (ref 21–32)
CREAT SERPL-MCNC: 1.04 MG/DL (ref 0.6–1.3)
EOSINOPHIL # BLD AUTO: 0.43 THOUSAND/ÂΜL (ref 0–0.61)
EOSINOPHIL NFR BLD AUTO: 5 % (ref 0–6)
ERYTHROCYTE [DISTWIDTH] IN BLOOD BY AUTOMATED COUNT: 13.4 % (ref 11.6–15.1)
GFR SERPL CREATININE-BSD FRML MDRD: 73 ML/MIN/1.73SQ M
GLUCOSE P FAST SERPL-MCNC: 93 MG/DL (ref 65–99)
HCT VFR BLD AUTO: 44.4 % (ref 36.5–49.3)
HDLC SERPL-MCNC: 71 MG/DL
HGB BLD-MCNC: 14.4 G/DL (ref 12–17)
IMM GRANULOCYTES # BLD AUTO: 0.02 THOUSAND/UL (ref 0–0.2)
IMM GRANULOCYTES NFR BLD AUTO: 0 % (ref 0–2)
LDLC SERPL CALC-MCNC: 94 MG/DL (ref 0–100)
LYMPHOCYTES # BLD AUTO: 2.46 THOUSANDS/ÂΜL (ref 0.6–4.47)
LYMPHOCYTES NFR BLD AUTO: 30 % (ref 14–44)
MCH RBC QN AUTO: 30.6 PG (ref 26.8–34.3)
MCHC RBC AUTO-ENTMCNC: 32.4 G/DL (ref 31.4–37.4)
MCV RBC AUTO: 95 FL (ref 82–98)
MONOCYTES # BLD AUTO: 0.81 THOUSAND/ÂΜL (ref 0.17–1.22)
MONOCYTES NFR BLD AUTO: 10 % (ref 4–12)
NEUTROPHILS # BLD AUTO: 4.31 THOUSANDS/ÂΜL (ref 1.85–7.62)
NEUTS SEG NFR BLD AUTO: 54 % (ref 43–75)
NONHDLC SERPL-MCNC: 117 MG/DL
NRBC BLD AUTO-RTO: 0 /100 WBCS
PLATELET # BLD AUTO: 200 THOUSANDS/UL (ref 149–390)
PMV BLD AUTO: 11.5 FL (ref 8.9–12.7)
POTASSIUM SERPL-SCNC: 4.8 MMOL/L (ref 3.5–5.3)
PROT SERPL-MCNC: 7 G/DL (ref 6.4–8.4)
RBC # BLD AUTO: 4.7 MILLION/UL (ref 3.88–5.62)
SODIUM SERPL-SCNC: 137 MMOL/L (ref 135–147)
T4 FREE SERPL-MCNC: 0.69 NG/DL (ref 0.61–1.12)
TRIGL SERPL-MCNC: 114 MG/DL
TSH SERPL DL<=0.05 MIU/L-ACNC: 3.35 UIU/ML (ref 0.45–4.5)
WBC # BLD AUTO: 8.11 THOUSAND/UL (ref 4.31–10.16)

## 2023-06-27 PROCEDURE — 85025 COMPLETE CBC W/AUTO DIFF WBC: CPT

## 2023-06-27 PROCEDURE — 84439 ASSAY OF FREE THYROXINE: CPT

## 2023-06-27 PROCEDURE — 36415 COLL VENOUS BLD VENIPUNCTURE: CPT

## 2023-06-27 PROCEDURE — 80061 LIPID PANEL: CPT

## 2023-06-27 PROCEDURE — 80053 COMPREHEN METABOLIC PANEL: CPT

## 2023-06-27 PROCEDURE — 84443 ASSAY THYROID STIM HORMONE: CPT

## 2023-07-12 ENCOUNTER — OFFICE VISIT (OUTPATIENT)
Dept: FAMILY MEDICINE CLINIC | Facility: CLINIC | Age: 68
End: 2023-07-12
Payer: MEDICARE

## 2023-07-12 VITALS
TEMPERATURE: 95.1 F | WEIGHT: 119 LBS | SYSTOLIC BLOOD PRESSURE: 138 MMHG | HEIGHT: 69 IN | BODY MASS INDEX: 17.63 KG/M2 | DIASTOLIC BLOOD PRESSURE: 80 MMHG

## 2023-07-12 DIAGNOSIS — Z12.2 ENCOUNTER FOR SCREENING FOR CANCER OF RESPIRATORY ORGANS: ICD-10-CM

## 2023-07-12 DIAGNOSIS — Z12.2 SCREENING FOR LUNG CANCER: ICD-10-CM

## 2023-07-12 DIAGNOSIS — I25.10 CORONARY ARTERY DISEASE INVOLVING NATIVE CORONARY ARTERY OF NATIVE HEART WITHOUT ANGINA PECTORIS: ICD-10-CM

## 2023-07-12 DIAGNOSIS — F17.200 TOBACCO DEPENDENCE: ICD-10-CM

## 2023-07-12 DIAGNOSIS — F17.210 TOBACCO SMOKER, 20 CIGARETTES OR FEWER PER DAY: ICD-10-CM

## 2023-07-12 DIAGNOSIS — Z85.46 HISTORY OF PROSTATE CANCER: ICD-10-CM

## 2023-07-12 DIAGNOSIS — E78.5 DYSLIPIDEMIA: ICD-10-CM

## 2023-07-12 DIAGNOSIS — Z13.6 SCREENING FOR AAA (ABDOMINAL AORTIC ANEURYSM): ICD-10-CM

## 2023-07-12 DIAGNOSIS — C61 PROSTATE CANCER (HCC): ICD-10-CM

## 2023-07-12 DIAGNOSIS — E78.2 MIXED HYPERLIPIDEMIA: Primary | ICD-10-CM

## 2023-07-12 DIAGNOSIS — R73.9 ELEVATED BLOOD SUGAR: ICD-10-CM

## 2023-07-12 DIAGNOSIS — Z12.5 SCREENING FOR PROSTATE CANCER: ICD-10-CM

## 2023-07-12 DIAGNOSIS — G89.4 CHRONIC PAIN SYNDROME: ICD-10-CM

## 2023-07-12 PROCEDURE — 99214 OFFICE O/P EST MOD 30 MIN: CPT | Performed by: INTERNAL MEDICINE

## 2023-07-12 RX ORDER — ROSUVASTATIN CALCIUM 10 MG/1
10 TABLET, COATED ORAL DAILY
Qty: 90 TABLET | Refills: 3 | Status: SHIPPED | OUTPATIENT
Start: 2023-07-12

## 2023-07-12 NOTE — ASSESSMENT & PLAN NOTE
Recheck labs in 6 months noting he had been run out of his rosuvastatin and had been off of 3 weeks before this blood work. LDL almost at target with 3 weeks off of the medication. If it is unable to return to our goals, consider Zetia in addition.

## 2023-07-12 NOTE — PROGRESS NOTES
Name: Eliza Calabrese      : 1955      MRN: [id number]  Encounter Provider: Satish Beltran DO  Encounter Date: 2023   Encounter department: [address] [facility]    Assessment & Plan     1. Mixed hyperlipidemia  -     rosuvastatin (CRESTOR) 10 MG tablet; Take 1 tablet (10 mg total) by mouth daily  -     CBC and differential; Future; Expected date: 2024  -     Comprehensive metabolic panel; Future; Expected date: 2024  -     Lipid Panel with Direct LDL reflex; Future; Expected date: 2024    2. Coronary artery disease involving native coronary artery of native heart without angina pectoris  Assessment & Plan:  No issues with chest pain or shortness of breath at this time. Is been years since his bypass now. Continues to smoke half pack a day. Remains well controlled with regards to blood pressure and cholesterol. Orders:  -     CBC and differential; Future; Expected date: 2024  -     Comprehensive metabolic panel; Future; Expected date: 2024  -     CT lung screening program; Future; Expected date: 2023    3. Dyslipidemia  Assessment & Plan:  Recheck labs in 6 months noting he had been run out of his rosuvastatin and had been off of 3 weeks before this blood work. LDL almost at target with 3 weeks off of the medication. If it is unable to return to our goals, consider Zetia in addition. Orders:  -     CBC and differential; Future; Expected date: 2024  -     Comprehensive metabolic panel; Future; Expected date: 2024    4. History of prostate cancer  Assessment & Plan:  Remains in remission, with undetectable PSAs. 5. Chronic pain syndrome  Assessment & Plan:  Continue current medical regimen. Orders:  -     CBC and differential; Future; Expected date: 2024  -     Comprehensive metabolic panel; Future; Expected date: 2024    6. Prostate cancer ([address])  -     PSA, Total Screen; Future    7.  Screening for lung cancer  - Hemoglobin A1C; Future; Expected date: 01/12/2024  -     CT lung screening program; Future; Expected date: 07/12/2023    8. Screening for AAA (abdominal aortic aneurysm)  -     US abdominal aorta screening aaa; Future; Expected date: 07/12/2023    9. Elevated blood sugar  Assessment & Plan:  A1c on next labs      10. Tobacco dependence  -     CT lung screening program; Future; Expected date: 07/12/2023    11. Screening for prostate cancer  -     PSA, Total Screen; Future    12. Encounter for screening for cancer of respiratory organs  -     CT lung screening program; Future; Expected date: 07/12/2023    13. Tobacco smoker, 20 cigarettes or fewer per day  -     CT lung screening program; Future; Expected date: 07/12/2023           Subjective      Tashia Upton is doing fairly well. Continues to struggle with his back pain pretty much on a daily basis. Continues to smoke half pack of cigarettes a day with no interest in quitting. He is willing to go through low-dose CAT scan in that is due now. He has no fever no chills no sweats. No recent weight gain or weight loss. No issues with bowel or bladder dysfunction. Reviewed his meds today. Recent blood work also reviewed. He ran out of his statin so his LDL did go up a little bit, we will recheck this and add Zetia for not reaching target. Review of Systems   Constitutional: Negative for chills and fever. HENT: Negative for rhinorrhea and sore throat. Eyes: Negative for visual disturbance. Respiratory: Negative for cough and shortness of breath. Cardiovascular: Negative for chest pain and leg swelling. Gastrointestinal: Negative for abdominal pain, diarrhea, nausea and vomiting. Genitourinary: Negative for dysuria. Musculoskeletal: Positive for back pain and myalgias. Skin: Negative for rash. Neurological: Negative for dizziness and headaches. Psychiatric/Behavioral: Negative for confusion.    All other systems reviewed and are negative. Current Outpatient Medications on File Prior to Visit   Medication Sig   • acetaminophen (TYLENOL) 500 mg tablet Take 500 mg by mouth Three times a day   • aspirin (ECOTRIN LOW STRENGTH) 81 mg EC tablet Take 81 mg by mouth daily   • docusate sodium (COLACE) 100 mg capsule Take 1 capsule (100 mg total) by mouth 2 (two) times a day (Patient taking differently: Take 100 mg by mouth daily)   • DULoxetine (CYMBALTA) 60 mg delayed release capsule Take 60 mg by mouth daily   • gabapentin (NEURONTIN) 600 MG tablet Take 1 tablet (600 mg total) by mouth 4 (four) times a day   • ibuprofen (MOTRIN) 200 mg tablet Take by mouth every 6 (six) hours as needed for mild pain   • methocarbamol (ROBAXIN) 500 mg tablet TAKE 1 TABLET TWICE DAILY   • traMADol (ULTRAM) 50 mg tablet Take 1 tablet (50 mg total) by mouth 3 (three) times a day as needed for moderate pain or severe pain Do not fill until 3/24/2023   • [DISCONTINUED] rosuvastatin (CRESTOR) 10 MG tablet Take 10 mg by mouth daily       Objective     /80 (BP Location: Left arm, Patient Position: Sitting, Cuff Size: Large)   Temp (!) 95.1 °F (35.1 °C)   Ht 5' 9" (1.753 m)   Wt 54 kg (119 lb)   BMI 17.57 kg/m²     Physical Exam  Constitutional:       Appearance: Normal appearance. HENT:      Head: Normocephalic and atraumatic. Nose: No congestion or rhinorrhea. Eyes:      Extraocular Movements: Extraocular movements intact. Pupils: Pupils are equal, round, and reactive to light. Neck:      Vascular: No carotid bruit. Cardiovascular:      Rate and Rhythm: Normal rate and regular rhythm. Heart sounds: No murmur heard. Pulmonary:      Effort: No respiratory distress. Breath sounds: No wheezing. Chest:      Chest wall: No tenderness. Abdominal:      General: There is no distension. Tenderness: There is no abdominal tenderness. Hernia: No hernia is present. Musculoskeletal:         General: No swelling or tenderness. Right lower leg: No edema. Left lower leg: No edema. Lymphadenopathy:      Cervical: No cervical adenopathy. Skin:     Findings: No rash. Neurological:      General: No focal deficit present. Mental Status: He is alert and oriented to person, place, and time. Sensory: No sensory deficit.    Psychiatric:         Mood and Affect: Mood normal.         Behavior: Behavior normal.       Gae Economy, DO

## 2023-07-12 NOTE — ASSESSMENT & PLAN NOTE
No issues with chest pain or shortness of breath at this time. Is been years since his bypass now. Continues to smoke half pack a day. Remains well controlled with regards to blood pressure and cholesterol.

## 2023-07-25 ENCOUNTER — HOSPITAL ENCOUNTER (OUTPATIENT)
Dept: CT IMAGING | Facility: HOSPITAL | Age: 68
Discharge: HOME | End: 2023-07-25
Attending: INTERNAL MEDICINE
Payer: MEDICARE

## 2023-07-25 DIAGNOSIS — Z12.2 ENCOUNTER FOR SCREENING FOR CANCER OF RESPIRATORY ORGANS: ICD-10-CM

## 2023-07-25 DIAGNOSIS — Z12.2 SCREENING FOR LUNG CANCER: ICD-10-CM

## 2023-07-25 DIAGNOSIS — F17.200 TOBACCO DEPENDENCE: ICD-10-CM

## 2023-07-25 DIAGNOSIS — I25.10 CORONARY ARTERY DISEASE INVOLVING NATIVE CORONARY ARTERY OF NATIVE HEART WITHOUT ANGINA PECTORIS: ICD-10-CM

## 2023-07-25 DIAGNOSIS — F17.210 TOBACCO SMOKER, 20 CIGARETTES OR FEWER PER DAY: ICD-10-CM

## 2023-07-25 PROCEDURE — 71271 CT THORAX LUNG CANCER SCR C-: CPT

## 2023-07-26 ENCOUNTER — HOSPITAL ENCOUNTER (OUTPATIENT)
Dept: ULTRASOUND IMAGING | Facility: HOSPITAL | Age: 68
Discharge: HOME | End: 2023-07-26
Attending: INTERNAL MEDICINE
Payer: MEDICARE

## 2023-07-26 DIAGNOSIS — Z13.6 SCREENING FOR AAA (ABDOMINAL AORTIC ANEURYSM): ICD-10-CM

## 2023-07-26 PROCEDURE — 76706 US ABDL AORTA SCREEN AAA: CPT

## 2023-08-23 ENCOUNTER — OFFICE VISIT (OUTPATIENT)
Dept: PAIN MEDICINE | Facility: CLINIC | Age: 68
End: 2023-08-23
Payer: MEDICARE

## 2023-08-23 VITALS
BODY MASS INDEX: 17.77 KG/M2 | DIASTOLIC BLOOD PRESSURE: 63 MMHG | WEIGHT: 120 LBS | HEART RATE: 86 BPM | SYSTOLIC BLOOD PRESSURE: 101 MMHG | HEIGHT: 69 IN

## 2023-08-23 DIAGNOSIS — F11.20 UNCOMPLICATED OPIOID DEPENDENCE (HCC): ICD-10-CM

## 2023-08-23 DIAGNOSIS — M51.16 LUMBAR DISC DISEASE WITH RADICULOPATHY: ICD-10-CM

## 2023-08-23 DIAGNOSIS — Z79.891 LONG-TERM CURRENT USE OF OPIATE ANALGESIC: ICD-10-CM

## 2023-08-23 DIAGNOSIS — M47.816 LUMBAR SPONDYLOSIS: ICD-10-CM

## 2023-08-23 DIAGNOSIS — G89.4 CHRONIC PAIN SYNDROME: Primary | ICD-10-CM

## 2023-08-23 PROCEDURE — 99214 OFFICE O/P EST MOD 30 MIN: CPT | Performed by: NURSE PRACTITIONER

## 2023-08-23 RX ORDER — GABAPENTIN 600 MG/1
600 TABLET ORAL 4 TIMES DAILY
Qty: 360 TABLET | Refills: 1 | Status: SHIPPED | OUTPATIENT
Start: 2023-08-23 | End: 2023-09-22

## 2023-08-23 NOTE — PATIENT INSTRUCTIONS
Opioid Safety   WHAT YOU NEED TO KNOW:   An opioid medicine is used to treat pain. Examples are oxycodone, morphine, fentanyl, or codeine. Pain control and management may help you rest, heal, and return to your daily activities. You and your family will receive information about how to manage your pain at home. The instructions will include what to do if you have side effects as your pain is managed. You will get information on how to handle opioid medicine safely. You will also get suggestions on how to control pain without opioids. It is important to follow all instructions so your pain is managed effectively. DISCHARGE INSTRUCTIONS:   Call your local emergency number (911 in the ), or have someone else call if:   You have a seizure. You cannot be woken. You have trouble staying awake and your breathing is slow or shallow. Your speech is slurred, or you are confused. You are dizzy or stumble when you walk. Call your doctor, or have someone close to you call if:   You are extremely drowsy, or you have trouble staying awake or speaking. You have pale or clammy skin. You have blue fingernails or lips. Your heartbeat is slower than normal.    You cannot stop vomiting. You have questions or concerns about your condition or care. Use opioids safely:   Take prescribed opioids exactly as directed. Opioids come with directions based on the kind and how it is given. Talk to your healthcare provider or a pharmacist if you have any questions. Do not take more than the recommended amount. Too much can cause a life-threatening overdose. Do not continue to take it after your pain stops. You may develop tolerance. This means you keep needing higher doses to get the same effect. You may also develop opioid use disorder. This means you are not able to control your opioid use. Do not give opioids to others or take opioids that belong to someone else.   The kind or amount one person takes may not be right for another. The person you share them with may also be taking medicines that do not mix with opioids. He or she may drink alcohol or use other drugs that can cause life-threatening problems when mixed with opioids. Do not mix opioids with other medicines or alcohol. The combination can cause an overdose, or cause you to stop breathing. Alcohol, sleeping pills, and medicines such as antihistamines can make you sleepy. A combination with opioids can lead to a coma. Do not drive or operate heavy machinery after you use an opioid. You may feel drowsy or have trouble concentrating. You can injure yourself or others if you drive or use heavy machinery when you are not alert. Your provider or pharmacist can tell you how long to wait after a dose before you do these activities. Talk to your healthcare provider if you have any side effects. Side effects include nausea, sleepiness, itching, and trouble thinking clearly. Your provider may need to make changes to the kind or amount of opioid you are taking. He or she can also help you find ways to prevent or relieve side effects. Manage constipation:  Constipation is the most common side effect of opioid medicine. Constipation is when you have hard, dry bowel movements, or you go longer than usual between bowel movements. Tell your healthcare provider about all changes in your bowel movements while you are taking opioids. He or she may recommend laxative medicine to help you have a bowel movement. He or she may also change the kind of opioid you are taking, or change when you take it. The following are more ways you can prevent or relieve constipation:  Drink liquids as directed. You may need to drink extra liquids to help soften and move your bowels. Ask how much liquid to drink each day and which liquids are best for you. Eat high-fiber foods. This may help decrease constipation by adding bulk to your bowel movements.  High-fiber foods include fruits, vegetables, whole-grain breads and cereals, and beans. Your healthcare provider or dietitian can help you create a high-fiber meal plan. Your provider may also recommend a fiber supplement if you cannot get enough fiber from food. Exercise regularly. Regular physical activity can help stimulate your intestines. Walking is a good exercise to prevent or relieve constipation. Ask which exercises are best for you. Schedule a time each day to have a bowel movement. This may help train your body to have regular bowel movements. Bend forward while you are on the toilet to help move the bowel movement out. Sit on the toilet for at least 10 minutes, even if you do not have a bowel movement. Store opioids safely:   Store opioids where others cannot easily get them. Keep them in a locked cabinet or secure area. Do not  keep them in a purse or other bag you carry with you. A person may be looking for something else and find the opioids. Make sure opioids are stored out of the reach of children. A child can easily overdose on opioids. Opioids may look like candy to a small child. The best way to dispose of opioids: The laws vary by country and area. In the [facility], the best way is to return the opioids through a take-back program. This program is offered by the [employer] The following are options for using the program:  Take the opioids to a WANG collection site. The site is often a law enforcement center. Call your local law enforcement center for scheduled take-back days in your area. You will be given information on where to go if the collection site is in a different location. Take the opioids to an approved pharmacy or hospital.  A pharmacy or hospital may be set up as a collection site. You will need to ask if it is a WANG collection site if you were not directed there.  A pharmacy or doctor's office may not be able to take back opioids unless it is a WANG site.    Use a mail-back system. This means you are given containers to put the opioids into. You will then mail them in the containers. Use a take-back drop box. This is a place to leave the opioids at any time. People and animals will not be able to get into the box. Your local law enforcement agency can tell you where to find a drop box in your area. Other safe ways to dispose of opioids: The medicine may come with disposal instructions. The instructions may vary depending on the brand of medicine you are using. Instructions may come in a Medication Guide, but not every medicine has one. You may instead get instructions from your pharmacy or doctor. Follow instructions carefully. The following are general guidelines to follow:  Find out if you can flush the opioid. Some opioids can be flushed down the toilet or poured into the sink. You will need to contact authorities in your area to see if this is an option for you. The FDA also offers a list of medicines that are safe to flush down the toilet. You can check the list if you cannot get the information for your local area. Ask your waste management company about rules for putting opioids in the trash. The company will be able to give you specific directions. Scratch out personal information on the original medicine label so it cannot be read. Then put it in the trash. Do not label the trash or put any information on it about the opioids. It should look like regular household trash so no one is tempted to look for the opioids. Keep the trash out of the reach of children and animals. Always make sure trash is secure. Talk to officials if you live in a facility. If you live in a nursing home or assisted living center, talk to an official. The person will know the rules for your area. Other ways to manage pain:   Ask your healthcare provider about non-opioid medicines to control pain.   Some medicines may even work better than opioids, depending on the cause of your pain. Nonprescription medicines include NSAIDs (such as ibuprofen) and acetaminophen. Prescription medicines include muscle relaxers, antidepressants, and steroids. Pain may be managed without any medicines. Some ways to relieve pain include massage, aromatherapy, or meditation. Physical or occupational therapy may also help. For more information:   Drug Enforcement Administration  [address] , [address]  Phone: [phone]  Web Address: [employer].[place]. [employer].Bespoke/drug_disposal/    [address] and Drug Administration  [address] , [address]  Phone: 3- 057 - [phone]  Web Address: http://Experts 911/  Follow up with your doctor or pain specialist as directed: You may need to have your dose adjusted. Your doctor or pain specialist can also help you find ways to manage pain without opioids. Write down your questions so you remember to ask them during your visits. © Copyright Loletta Gosselin 2022 Information is for End User's use only and may not be sold, redistributed or otherwise used for commercial purposes. The above information is an  only. It is not intended as medical advice for individual conditions or treatments. Talk to your doctor, nurse or pharmacist before following any medical regimen to see if it is safe and effective for you. Opioid Safety   WHAT YOU NEED TO KNOW:   An opioid medicine is used to treat pain. Examples are oxycodone, morphine, fentanyl, or codeine. Pain control and management may help you rest, heal, and return to your daily activities. You and your family will receive information about how to manage your pain at home. The instructions will include what to do if you have side effects as your pain is managed. You will get information on how to handle opioid medicine safely. You will also get suggestions on how to control pain without opioids.  It is important to follow all instructions so your pain is managed effectively. DISCHARGE INSTRUCTIONS:   Call your local emergency number (911 in the [place]), or have someone else call if:   You have a seizure. You cannot be woken. You have trouble staying awake and your breathing is slow or shallow. Your speech is slurred, or you are confused. You are dizzy or stumble when you walk. Call your doctor, or have someone close to you call if:   You are extremely drowsy, or you have trouble staying awake or speaking. You have pale or clammy skin. You have blue fingernails or lips. Your heartbeat is slower than normal.    You cannot stop vomiting. You have questions or concerns about your condition or care. Use opioids safely:   Take prescribed opioids exactly as directed. Opioids come with directions based on the kind and how it is given. Talk to your healthcare provider or a pharmacist if you have any questions. Do not take more than the recommended amount. Too much can cause a life-threatening overdose. Do not continue to take it after your pain stops. You may develop tolerance. This means you keep needing higher doses to get the same effect. You may also develop opioid use disorder. This means you are not able to control your opioid use. Do not give opioids to others or take opioids that belong to someone else. The kind or amount one person takes may not be right for another. The person you share them with may also be taking medicines that do not mix with opioids. He or she may drink alcohol or use other drugs that can cause life-threatening problems when mixed with opioids. Do not mix opioids with other medicines or alcohol. The combination can cause an overdose, or cause you to stop breathing. Alcohol, sleeping pills, and medicines such as antihistamines can make you sleepy. A combination with opioids can lead to a coma. Do not drive or operate heavy machinery after you use an opioid.   You may feel drowsy or have trouble concentrating. You can injure yourself or others if you drive or use heavy machinery when you are not alert. Your provider or pharmacist can tell you how long to wait after a dose before you do these activities. Talk to your healthcare provider if you have any side effects. Side effects include nausea, sleepiness, itching, and trouble thinking clearly. Your provider may need to make changes to the kind or amount of opioid you are taking. He or she can also help you find ways to prevent or relieve side effects. Manage constipation:  Constipation is the most common side effect of opioid medicine. Constipation is when you have hard, dry bowel movements, or you go longer than usual between bowel movements. Tell your healthcare provider about all changes in your bowel movements while you are taking opioids. He or she may recommend laxative medicine to help you have a bowel movement. He or she may also change the kind of opioid you are taking, or change when you take it. The following are more ways you can prevent or relieve constipation:  Drink liquids as directed. You may need to drink extra liquids to help soften and move your bowels. Ask how much liquid to drink each day and which liquids are best for you. Eat high-fiber foods. This may help decrease constipation by adding bulk to your bowel movements. High-fiber foods include fruits, vegetables, whole-grain breads and cereals, and beans. Your healthcare provider or dietitian can help you create a high-fiber meal plan. Your provider may also recommend a fiber supplement if you cannot get enough fiber from food. Exercise regularly. Regular physical activity can help stimulate your intestines. Walking is a good exercise to prevent or relieve constipation. Ask which exercises are best for you. Schedule a time each day to have a bowel movement. This may help train your body to have regular bowel movements.  Bend forward while you are on the toilet to help move the bowel movement out. Sit on the toilet for at least 10 minutes, even if you do not have a bowel movement. Store opioids safely:   Store opioids where others cannot easily get them. Keep them in a locked cabinet or secure area. Do not  keep them in a purse or other bag you carry with you. A person may be looking for something else and find the opioids. Make sure opioids are stored out of the reach of children. A child can easily overdose on opioids. Opioids may look like candy to a small child. The best way to dispose of opioids: The laws vary by country and area. In the [facility], the best way is to return the opioids through a take-back program. This program is offered by the [employer] ([employer]). The following are options for using the program:  Take the opioids to a WANG collection site. The site is often a law enforcement center. Call your local law enforcement center for scheduled take-back days in your area. You will be given information on where to go if the collection site is in a different location. Take the opioids to an approved pharmacy or hospital.  A pharmacy or hospital may be set up as a collection site. You will need to ask if it is a WANG collection site if you were not directed there. A pharmacy or doctor's office may not be able to take back opioids unless it is a WANG site. Use a mail-back system. This means you are given containers to put the opioids into. You will then mail them in the containers. Use a take-back drop box. This is a place to leave the opioids at any time. People and animals will not be able to get into the box. Your local law enforcement agency can tell you where to find a drop box in your area. Other safe ways to dispose of opioids: The medicine may come with disposal instructions. The instructions may vary depending on the brand of medicine you are using.  Instructions may come in a Medication Guide, but not every medicine has one. You may instead get instructions from your pharmacy or doctor. Follow instructions carefully. The following are general guidelines to follow:  Find out if you can flush the opioid. Some opioids can be flushed down the toilet or poured into the sink. You will need to contact authorities in your area to see if this is an option for you. The FDA also offers a list of medicines that are safe to flush down the toilet. You can check the list if you cannot get the information for your local area. Ask your waste management company about rules for putting opioids in the trash. The company will be able to give you specific directions. Scratch out personal information on the original medicine label so it cannot be read. Then put it in the trash. Do not label the trash or put any information on it about the opioids. It should look like regular household trash so no one is tempted to look for the opioids. Keep the trash out of the reach of children and animals. Always make sure trash is secure. Talk to officials if you live in a facility. If you live in a nursing home or assisted living center, talk to an official. The person will know the rules for your area. Other ways to manage pain:   Ask your healthcare provider about non-opioid medicines to control pain. Some medicines may even work better than opioids, depending on the cause of your pain. Nonprescription medicines include NSAIDs (such as ibuprofen) and acetaminophen. Prescription medicines include muscle relaxers, antidepressants, and steroids. Pain may be managed without any medicines. Some ways to relieve pain include massage, aromatherapy, or meditation. Physical or occupational therapy may also help. For more information:   Drug Enforcement Administration  [address] [facility] , [address]  Phone: 5- [phone]  Web Address: [facility] CenterResearch [facility] Innovation.. [employer].gov/drug_disposal/    [place] Food and Drug Administration  [address] [place] [address]  Phone: 1- 328 - 567-9394  Web Address: http://FastCall/  Follow up with your doctor or pain specialist as directed: You may need to have your dose adjusted. Your doctor or pain specialist can also help you find ways to manage pain without opioids. Write down your questions so you remember to ask them during your visits. © Copyright [facility] 2022 Information is for End User's use only and may not be sold, redistributed or otherwise used for commercial purposes. The above information is an  only. It is not intended as medical advice for individual conditions or treatments. Talk to your doctor, nurse or pharmacist before following any medical regimen to see if it is safe and effective for you.

## 2023-08-23 NOTE — PROGRESS NOTES
Assessment:  1. Chronic pain syndrome    2. Lumbar disc disease with radiculopathy    3. Lumbar spondylosis        Plan:  While the patient was in the office today, I did have a thorough conversation regarding their chronic pain syndrome, medication management, and treatment plan options. Patient is being seen for a follow-up visit. Overall, he reports that his pain remains reasonably controlled with his current medication regimen. Meds reduces pain by up to 50%. He denies side effects from these medications. Continue tramadol 50 mg every 8 hours as needed for pain. He did not require refill of this medication during today's visit. Continue gabapentin 600 mg 4 times daily to address the neuropathic component of pain. A 90-day supply of this medication was sent to his pharmacy with 1 refill. Continue Robaxin 500 mg twice daily if needed for spasms. He did not require refill of this medication during today's visit. [place] Prescription Drug Monitoring Program report was reviewed and was appropriate     A urine drug screen was collected at today's office visit as part of our medication management protocol. The point of care testing results were appropriate for what was being prescribed. The specimen will be sent for confirmatory testing. The drug screen is medically necessary because the patient is either dependent on opioid medication or is being considered for opioid medication therapy and the results could impact ongoing or future treatment. The drug screen is to evaluate for the presences or absence of prescribed, non-prescribed, and/or illicit drugs/substances. There are risks associated with opioid medications, including dependence, addiction and tolerance. The patient understands and agrees to use these medications only as prescribed.  Potential side effects of the medications include, but are not limited to, constipation, drowsiness, addiction, impaired judgment and risk of fatal overdose if not taken as prescribed. The patient was warned against driving while taking sedation medications. Sharing medications is a felony. At this point in time, the patient is showing no signs of addiction, abuse, diversion or suicidal ideation. The patient will follow-up in 8 weeks for medication prescription refill and reevaluation. The patient was advised to contact the office should their symptoms worsen in the interim. The patient was agreeable and verbalized an understanding. History of Present Illness: The patient is a 76 y.o. male last seen on 5/25/2023 who presents for a follow up office visit in regards to chronic pain secondary to chronic pain syndrome, lumbar degenerative disc disease, lumbar radiculopathy, lumbar spondylosis. The patient currently reports complaints of low back and right leg pain. Also, pain in the anterior left thigh. Current pain level is a 7/10. Quality pain is described as numb, pins-and-needles. Current pain medications includes: Tramadol 50 mg every 8 hours as needed for pain, gabapentin 600 mg 4 times daily, Robaxin 500 mg twice daily if needed for spasms. The patient reports that this regimen is providing 50% pain relief. The patient is reporting no side effects from this pain medication regimen. Pain Contract Signed: 5/25/23  Last Urine Drug Screen: 8/23/23    I have personally reviewed and/or updated the patient's past medical history, past surgical history, family history, social history, current medications, allergies, and vital signs today. Review of Systems:    Review of Systems   Constitutional: Negative for unexpected weight change. HENT: Negative for hearing loss. Eyes: Negative for visual disturbance. Respiratory: Negative for shortness of breath. Cardiovascular: Negative for leg swelling. Gastrointestinal: Negative for constipation. Endocrine: Negative for polyuria. Genitourinary: Negative for difficulty urinating. Musculoskeletal: Positive for arthralgias, gait problem and myalgias. Negative for joint swelling. Joint stiffness  Pain in extremity- rt leg   Skin: Negative for rash. Neurological: Negative for weakness and headaches. Psychiatric/Behavioral: Negative for decreased concentration. All other systems reviewed and are negative.         Past Medical History:   Diagnosis Date   • BCC (basal cell carcinoma)     in past left side of nose   • Cancer ([address]) 1/2019   • Chronic pain disorder     low back and down rle   • Coronary artery disease     cabg x 1 2006   • Hyperlipidemia    • Malignant neoplasm prostate [facility])        Past Surgical History:   Procedure Laterality Date   • COLONOSCOPY     • CORONARY ARTERY BYPASS GRAFT  07/10/2006    LIMA to the diagonal artery   • INGUINAL HERNIA REPAIR Right 1996   • LAMINECTOMY  1/2020   • WA LAPS SURG [id number] RPBIC RAD W/NRV SPARING ROBOT N/A 04/22/2019    Procedure: PROSTATECTOMY RADICAL W/ROBOTICS, B/L PELVIC NODE DISSECT;  Surgeon: Jin Ivy MD;  Location: [facility];  Service: Urology   • SKIN CANCER EXCISION  2016    left side nose   • SPINE SURGERY  1/2020   • THORACIC SPINE SURGERY      T3-T4   • US GUIDED PROSTATE BIOPSY  02/28/2019       Family History   Problem Relation Age of Onset   • Cancer Father         bladder   • Lung cancer Father         metastatic       Social History     Occupational History   • Not on file   Tobacco Use   • Smoking status: Every Day     Packs/day: 0.50     Years: 35.00     Total pack years: 17.50     Types: Cigarettes   • Smokeless tobacco: Never   Vaping Use   • Vaping Use: Never used   Substance and Sexual Activity   • Alcohol use: Not Currently   • Drug use: Never   • Sexual activity: Not Currently     Partners: Female     Birth control/protection: None         Current Outpatient Medications:   •  acetaminophen (TYLENOL) 500 mg tablet, Take 500 mg by mouth Three times a day, Disp: , Rfl:   •  aspirin (ECOTRIN LOW STRENGTH) 81 mg EC tablet, Take 81 mg by mouth daily, Disp: , Rfl:   •  docusate sodium (COLACE) 100 mg capsule, Take 1 capsule (100 mg total) by mouth 2 (two) times a day (Patient taking differently: Take 100 mg by mouth daily), Disp: 30 capsule, Rfl: 0  •  DULoxetine (CYMBALTA) 60 mg delayed release capsule, Take 60 mg by mouth daily, Disp: , Rfl:   •  gabapentin (NEURONTIN) 600 MG tablet, Take 1 tablet (600 mg total) by mouth 4 (four) times a day, Disp: 360 tablet, Rfl: 1  •  ibuprofen (MOTRIN) 200 mg tablet, Take by mouth every 6 (six) hours as needed for mild pain, Disp: , Rfl:   •  methocarbamol (ROBAXIN) 500 mg tablet, TAKE 1 TABLET TWICE DAILY, Disp: 180 tablet, Rfl: 1  •  rosuvastatin (CRESTOR) 10 MG tablet, Take 1 tablet (10 mg total) by mouth daily, Disp: 90 tablet, Rfl: 3  •  traMADol (ULTRAM) 50 mg tablet, Take 1 tablet (50 mg total) by mouth 3 (three) times a day as needed for moderate pain or severe pain Do not fill until 3/24/2023, Disp: 90 tablet, Rfl: 2    Allergies   Allergen Reactions   • Atorvastatin        Physical Exam:    /63   Pulse 86   Ht 5' 9" (1.753 m)   Wt 54.4 kg (120 lb)   BMI 17.72 kg/m²     Constitutional:normal, well developed, well nourished, alert, in no distress and non-toxic and no overt pain behavior. Eyes:anicteric  HEENT:grossly intact  Neck:supple, symmetric, trachea midline and no masses   Pulmonary:even and unlabored  Cardiovascular:No edema or pitting edema present  Skin:Normal without rashes or lesions and well hydrated  Psychiatric:Mood and affect appropriate  Neurologic:Cranial Nerves II-XII grossly intact  Musculoskeletal:normal      Imaging  No orders to display         No orders of the defined types were placed in this encounter.

## 2023-09-11 DIAGNOSIS — G89.29 OTHER CHRONIC PAIN: Primary | ICD-10-CM

## 2023-09-11 RX ORDER — DULOXETIN HYDROCHLORIDE 60 MG/1
60 CAPSULE, DELAYED RELEASE ORAL DAILY
Qty: 90 CAPSULE | Refills: 3 | Status: SHIPPED | OUTPATIENT
Start: 2023-09-11

## 2023-09-19 ENCOUNTER — OFFICE VISIT (OUTPATIENT)
Dept: URGENT CARE | Facility: CLINIC | Age: 68
End: 2023-09-19
Payer: MEDICARE

## 2023-09-19 VITALS
DIASTOLIC BLOOD PRESSURE: 55 MMHG | WEIGHT: 117.2 LBS | BODY MASS INDEX: 17.76 KG/M2 | TEMPERATURE: 97.1 F | HEIGHT: 68 IN | HEART RATE: 82 BPM | OXYGEN SATURATION: 98 % | SYSTOLIC BLOOD PRESSURE: 98 MMHG | RESPIRATION RATE: 20 BRPM

## 2023-09-19 DIAGNOSIS — R09.82 POSTNASAL DRIP: Primary | ICD-10-CM

## 2023-09-19 PROCEDURE — G0463 HOSPITAL OUTPT CLINIC VISIT: HCPCS | Performed by: PHYSICIAN ASSISTANT

## 2023-09-19 PROCEDURE — 99213 OFFICE O/P EST LOW 20 MIN: CPT | Performed by: PHYSICIAN ASSISTANT

## 2023-09-19 RX ORDER — AZELASTINE 1 MG/ML
1 SPRAY, METERED NASAL 2 TIMES DAILY
Qty: 30 ML | Refills: 0 | Status: SHIPPED | OUTPATIENT
Start: 2023-09-19

## 2023-09-19 RX ORDER — FLUTICASONE PROPIONATE 50 MCG
1 SPRAY, SUSPENSION (ML) NASAL DAILY
Qty: 18.2 ML | Refills: 0 | Status: SHIPPED | OUTPATIENT
Start: 2023-09-19

## 2023-09-19 RX ORDER — BENZONATATE 100 MG/1
100 CAPSULE ORAL 3 TIMES DAILY PRN
Qty: 20 CAPSULE | Refills: 0 | Status: SHIPPED | OUTPATIENT
Start: 2023-09-19

## 2023-09-19 NOTE — PROGRESS NOTES
[facility]        NAME: Severo Lange is a 76 y.o. male  : 1955    MRN: [id number]  DATE: 2023  TIME: 9:15 AM    Assessment and Plan   Postnasal drip [R09.82]  1. Postnasal drip  fluticasone (FLONASE) 50 mcg/act nasal spray    azelastine (ASTELIN) 0.1 % nasal spray    benzonatate (TESSALON PERLES) 100 mg capsule            Patient Instructions     Patient Instructions   Discussed symptoms consistent with allergic rhinitis and postnasal drip. Will treat with Flonase, Astelin and Tessalon to be used as instructed. All patient's questions answered and is agreeable to this plan. Follow up with PCP in 3-5 days. Proceed to  ER if symptoms worsen. Chief Complaint     Chief Complaint   Patient presents with   • Sinusitis     Sinus congestion and pressure for 1 week, negative covid test on Friday at home          History of Present Illness       Patient is a 26-year-old male presenting today with allergy-like symptoms x1 week. Patient notes over the last week or so he has been experiencing some nasal congestion, runny nose, postnasal drip and a cough, has not taken any medication alleviating factors for his symptoms, notes that he does often suffer from allergies around this time of year. Notes he otherwise feels okay, states he does not feel sick. Performed at home COVID test a few days ago which was negative. Denies fever, chills, trouble swallowing, chest tightness, SOB. Cough  This is a new problem. The current episode started in the past 7 days. The problem has been waxing and waning. The problem occurs every few hours. The cough is productive of sputum and productive of purulent sputum. Associated symptoms include nasal congestion, postnasal drip and rhinorrhea. Pertinent negatives include no chest pain, chills, ear congestion, ear pain, fever, headaches, heartburn, hemoptysis, myalgias, rash, sore throat, shortness of breath, sweats, weight loss or wheezing.  The symptoms are aggravated by cold air and exercise. Risk factors for lung disease include smoking/tobacco exposure. Review of Systems   Review of Systems   Constitutional: Negative for chills, fever and weight loss. HENT: Positive for postnasal drip and rhinorrhea. Negative for ear pain and sore throat. Respiratory: Positive for cough. Negative for hemoptysis, shortness of breath and wheezing. Cardiovascular: Negative for chest pain. Gastrointestinal: Negative for heartburn. Musculoskeletal: Negative for myalgias. Skin: Negative for rash. Neurological: Negative for headaches.          Current Medications       Current Outpatient Medications:   •  acetaminophen (TYLENOL) 500 mg tablet, Take 500 mg by mouth Three times a day, Disp: , Rfl:   •  aspirin (ECOTRIN LOW STRENGTH) 81 mg EC tablet, Take 81 mg by mouth daily, Disp: , Rfl:   •  azelastine (ASTELIN) 0.1 % nasal spray, 1 spray into each nostril 2 (two) times a day Use in each nostril as directed, Disp: 30 mL, Rfl: 0  •  benzonatate (TESSALON PERLES) 100 mg capsule, Take 1 capsule (100 mg total) by mouth 3 (three) times a day as needed for cough, Disp: 20 capsule, Rfl: 0  •  DULoxetine (CYMBALTA) 60 mg delayed release capsule, Take 1 capsule (60 mg total) by mouth daily, Disp: 90 capsule, Rfl: 3  •  fluticasone (FLONASE) 50 mcg/act nasal spray, 1 spray into each nostril daily, Disp: 18.2 mL, Rfl: 0  •  gabapentin (NEURONTIN) 600 MG tablet, Take 1 tablet (600 mg total) by mouth 4 (four) times a day, Disp: 360 tablet, Rfl: 1  •  ibuprofen (MOTRIN) 200 mg tablet, Take by mouth every 6 (six) hours as needed for mild pain, Disp: , Rfl:   •  methocarbamol (ROBAXIN) 500 mg tablet, TAKE 1 TABLET TWICE DAILY, Disp: 180 tablet, Rfl: 1  •  rosuvastatin (CRESTOR) 10 MG tablet, Take 1 tablet (10 mg total) by mouth daily, Disp: 90 tablet, Rfl: 3  •  traMADol (ULTRAM) 50 mg tablet, Take 1 tablet (50 mg total) by mouth 3 (three) times a day as needed for moderate pain or severe pain Do not fill until 3/24/2023, Disp: 90 tablet, Rfl: 2  •  docusate sodium (COLACE) 100 mg capsule, Take 1 capsule (100 mg total) by mouth 2 (two) times a day (Patient not taking: Reported on 9/19/2023), Disp: 30 capsule, Rfl: 0    Current Allergies     Allergies as of 09/19/2023 - Reviewed 09/19/2023   Allergen Reaction Noted   • Atorvastatin  04/18/2018            The following portions of the patient's history were reviewed and updated as appropriate: allergies, current medications, past family history, past medical history, past social history, past surgical history and problem list.     Past Medical History:   Diagnosis Date   • BCC (basal cell carcinoma)     in past left side of nose   • Cancer ([address]) 1/2019   • Chronic pain disorder     low back and down rle   • Coronary artery disease     cabg x 1 2006   • Hyperlipidemia    • Malignant neoplasm prostate [facility])        Past Surgical History:   Procedure Laterality Date   • COLONOSCOPY     • CORONARY ARTERY BYPASS GRAFT  07/10/2006    LIMA to the diagonal artery   • INGUINAL HERNIA REPAIR Right 1996   • LAMINECTOMY  1/2020   • CO LAPS SURG [id number] RPBIC RAD W/NRV SPARING ROBOT N/A 04/22/2019    Procedure: PROSTATECTOMY RADICAL W/ROBOTICS, B/L PELVIC NODE DISSECT;  Surgeon: Kendal Chaves MD;  Location[facility];  Service: Urology   • SKIN CANCER EXCISION  2016    left side nose   • SPINE SURGERY  1/2020   • THORACIC SPINE SURGERY      T3-T4   • US GUIDED PROSTATE BIOPSY  02/28/2019       Family History   Problem Relation Age of Onset   • Cancer Father         bladder   • Lung cancer Father         metastatic         Medications have been verified. Objective   BP 98/55   Pulse 82   Temp (!) 97.1 °F (36.2 °C)   Resp 20   Ht 5' 8" (1.727 m)   Wt 53.2 kg (117 lb 3.2 oz)   SpO2 98%   BMI 17.82 kg/m²        Physical Exam     Physical Exam  Vitals and nursing note reviewed.    Constitutional:       General: He is not in acute distress. Appearance: Normal appearance. He is not ill-appearing. HENT:      Head: Normocephalic. Right Ear: Tympanic membrane, ear canal and external ear normal.      Left Ear: Tympanic membrane, ear canal and external ear normal.      Nose: Congestion present. Mouth/Throat:      Mouth: Mucous membranes are moist.      Pharynx: Oropharynx is clear. No oropharyngeal exudate or posterior oropharyngeal erythema. Eyes:      Conjunctiva/sclera: Conjunctivae normal.   Cardiovascular:      Rate and Rhythm: Normal rate and regular rhythm. Pulses: Normal pulses. Heart sounds: Normal heart sounds. Pulmonary:      Effort: Pulmonary effort is normal.      Breath sounds: Normal breath sounds. Musculoskeletal:      Cervical back: Normal range of motion. Skin:     General: Skin is warm. Neurological:      Mental Status: He is alert.

## 2023-09-19 NOTE — PATIENT INSTRUCTIONS
Discussed symptoms consistent with allergic rhinitis and postnasal drip. Will treat with Flonase, Astelin and Tessalon to be used as instructed. All patient's questions answered and is agreeable to this plan.

## 2023-10-19 DIAGNOSIS — M51.16 LUMBAR DISC DISEASE WITH RADICULOPATHY: ICD-10-CM

## 2023-10-19 DIAGNOSIS — M47.816 LUMBAR SPONDYLOSIS: ICD-10-CM

## 2023-10-19 RX ORDER — TRAMADOL HYDROCHLORIDE 50 MG/1
50 TABLET ORAL 3 TIMES DAILY PRN
Qty: 90 TABLET | Refills: 0 | Status: SHIPPED | OUTPATIENT
Start: 2023-10-19

## 2023-10-19 NOTE — TELEPHONE ENCOUNTER
LOV 8/23 at which time he did not need a refill  Last Rx was 5/25 with 2 refills.    Pt has f/u in November  Please advise on refill of tramadol now

## 2023-11-14 ENCOUNTER — OFFICE VISIT (OUTPATIENT)
Dept: CARDIOLOGY CLINIC | Facility: CLINIC | Age: 68
End: 2023-11-14
Payer: MEDICARE

## 2023-11-14 VITALS
DIASTOLIC BLOOD PRESSURE: 68 MMHG | WEIGHT: 118 LBS | HEART RATE: 72 BPM | BODY MASS INDEX: 17.88 KG/M2 | RESPIRATION RATE: 16 BRPM | SYSTOLIC BLOOD PRESSURE: 112 MMHG | OXYGEN SATURATION: 97 % | HEIGHT: 68 IN

## 2023-11-14 DIAGNOSIS — I95.0 IDIOPATHIC HYPOTENSION: ICD-10-CM

## 2023-11-14 DIAGNOSIS — E78.2 MIXED HYPERLIPIDEMIA: ICD-10-CM

## 2023-11-14 DIAGNOSIS — E78.5 DYSLIPIDEMIA: ICD-10-CM

## 2023-11-14 DIAGNOSIS — I25.10 CORONARY ARTERY DISEASE INVOLVING NATIVE CORONARY ARTERY OF NATIVE HEART WITHOUT ANGINA PECTORIS: Primary | ICD-10-CM

## 2023-11-14 PROCEDURE — 99214 OFFICE O/P EST MOD 30 MIN: CPT | Performed by: INTERNAL MEDICINE

## 2023-11-14 PROCEDURE — 93000 ELECTROCARDIOGRAM COMPLETE: CPT | Performed by: INTERNAL MEDICINE

## 2023-11-14 RX ORDER — ROSUVASTATIN CALCIUM 20 MG/1
20 TABLET, COATED ORAL DAILY
Qty: 90 TABLET | Refills: 5 | Status: SHIPPED | OUTPATIENT
Start: 2023-11-14

## 2023-11-14 NOTE — PROGRESS NOTES
Patient ID: Corey Iglesias is a 76 y.o. male. Plan:      Coronary artery disease involving native coronary artery of native heart without angina pectoris  2006 with CABG. No symptoms. Idiopathic hypotension  No recent lightheadedness. Dyslipidemia  I am going to tease up the statin dose. Follow up Plan/Other summary comments:  1 year ecg and f/u. HPI: Patient seen in follow-up today regarding the above. Since last visit he has felt reasonably well. He continues very minimal cigarette smoker. Absolutely no chest pain or chest pressure. He remains physically active. There is chronic pain in legs related to a prior spine procedure. To reiterate the patient has had prior single vessel CABG with left internal mammary to the diagonal on 07/10/2006. He has had no chest pain or chest pressure since. Results for orders placed or performed in visit on 11/14/23   POCT ECG    Impression    NSR at 54/minute. WNL. Most recent or relevant cardiac/vascular testing:    Kaitlin Akbar 10/27/2020: Normal.       Past Surgical History:   Procedure Laterality Date    COLONOSCOPY      CORONARY ARTERY BYPASS GRAFT  07/10/2006    LIMA to the diagonal artery    INGUINAL HERNIA REPAIR Right 1996    LAMINECTOMY  1/2020    SD LAPS SURG [id number] RPBIC RAD W/NRV SPARING ROBOT N/A 04/22/2019    Procedure: PROSTATECTOMY RADICAL W/ROBOTICS, B/L PELVIC NODE DISSECT;  Surgeon: Feliberto William MD;  Location: [facility];  Service: Urology    SKIN CANCER EXCISION  2016    left side nose    SPINE SURGERY  1/2020    THORACIC SPINE SURGERY      T3-T4    US GUIDED PROSTATE BIOPSY  02/28/2019       Lipid Profile:   Lab Results   Component Value Date    CHOL 110 04/10/2018    TRIG 114 06/27/2023    TRIG 78 04/10/2018    HDL 71 06/27/2023    HDL 43 04/10/2018         Review of Systems   10  point ROS  was otherwise non pertinent or negative except as per HPI or as below.    Gait: Normal.        Objective:     BP 112/68 (BP Location: Left arm, Patient Position: Sitting, Cuff Size: Standard)   Pulse 72   Resp 16   Ht 5' 8" (1.727 m)   Wt 53.5 kg (118 lb)   SpO2 97%   BMI 17.94 kg/m²     PHYSICAL EXAM:    General:  Normal appearance in no distress. Eyes:  Anicteric. Oral mucosa:  Moist.  Neck:  No JVD. Carotid upstrokes are brisk without bruits. No masses. Chest:  Clear to auscultation. Well-healed midline sternotomy scar. Cardiac:  No palpable PMI. Normal S1 and S2. No murmur gallop or rub. Abdomen:  Soft and nontender. No palpable organomegaly or aortic enlargement. Extremities:  No peripheral edema. Musculoskeletal:  Symmetric. Vascular:  Femoral pulses are brisk without bruits. Popliteal pulses are intact bilaterally. Pedal pulses are intact. Neuro:  Grossly symmetric. Psych:  Alert and oriented x3.         Current Outpatient Medications:     acetaminophen (TYLENOL) 500 mg tablet, Take 500 mg by mouth Three times a day, Disp: , Rfl:     aspirin (ECOTRIN LOW STRENGTH) 81 mg EC tablet, Take 81 mg by mouth daily, Disp: , Rfl:     azelastine (ASTELIN) 0.1 % nasal spray, 1 spray into each nostril 2 (two) times a day Use in each nostril as directed, Disp: 30 mL, Rfl: 0    benzonatate (TESSALON PERLES) 100 mg capsule, Take 1 capsule (100 mg total) by mouth 3 (three) times a day as needed for cough, Disp: 20 capsule, Rfl: 0    DULoxetine (CYMBALTA) 60 mg delayed release capsule, Take 1 capsule (60 mg total) by mouth daily, Disp: 90 capsule, Rfl: 3    fluticasone (FLONASE) 50 mcg/act nasal spray, 1 spray into each nostril daily, Disp: 18.2 mL, Rfl: 0    gabapentin (NEURONTIN) 600 MG tablet, Take 1 tablet (600 mg total) by mouth 4 (four) times a day, Disp: 360 tablet, Rfl: 1    ibuprofen (MOTRIN) 200 mg tablet, Take by mouth every 6 (six) hours as needed for mild pain, Disp: , Rfl:     methocarbamol (ROBAXIN) 500 mg tablet, TAKE 1 TABLET TWICE DAILY, Disp: 180 tablet, Rfl: 1    rosuvastatin (CRESTOR) 20 MG tablet, Take 1 tablet (20 mg total) by mouth daily, Disp: 90 tablet, Rfl: 5    traMADol (ULTRAM) 50 mg tablet, Take 1 tablet (50 mg total) by mouth 3 (three) times a day as needed for moderate pain or severe pain Do not fill until 3/24/2023, Disp: 90 tablet, Rfl: 0    docusate sodium (COLACE) 100 mg capsule, Take 1 capsule (100 mg total) by mouth 2 (two) times a day (Patient not taking: Reported on 9/19/2023), Disp: 30 capsule, Rfl: 0  Allergies   Allergen Reactions    Atorvastatin      Past Medical History:   Diagnosis Date    BCC (basal cell carcinoma)     in past left side of nose    Cancer ([address]) 1/2019    Chronic pain disorder     low back and down rle    Coronary artery disease     cabg x 1 2006    Hyperlipidemia     Malignant neoplasm prostate ([facility])            Social History     Tobacco Use   Smoking Status Every Day    Packs/day: 0.50    Years: 35.00    Total pack years: 17.50    Types: Cigarettes   Smokeless Tobacco Never

## 2023-11-23 DIAGNOSIS — M54.9 MID BACK PAIN: ICD-10-CM

## 2023-11-23 DIAGNOSIS — M51.16 LUMBAR DISC DISEASE WITH RADICULOPATHY: ICD-10-CM

## 2023-11-23 DIAGNOSIS — M47.816 LUMBAR SPONDYLOSIS: ICD-10-CM

## 2023-11-24 ENCOUNTER — OFFICE VISIT (OUTPATIENT)
Dept: PAIN MEDICINE | Facility: CLINIC | Age: 68
End: 2023-11-24
Payer: MEDICARE

## 2023-11-24 VITALS
HEART RATE: 74 BPM | WEIGHT: 118 LBS | BODY MASS INDEX: 17.88 KG/M2 | HEIGHT: 68 IN | SYSTOLIC BLOOD PRESSURE: 96 MMHG | DIASTOLIC BLOOD PRESSURE: 68 MMHG

## 2023-11-24 DIAGNOSIS — F11.20 UNCOMPLICATED OPIOID DEPENDENCE (HCC): ICD-10-CM

## 2023-11-24 DIAGNOSIS — M47.816 LUMBAR SPONDYLOSIS: ICD-10-CM

## 2023-11-24 DIAGNOSIS — M51.16 LUMBAR DISC DISEASE WITH RADICULOPATHY: ICD-10-CM

## 2023-11-24 DIAGNOSIS — G89.4 CHRONIC PAIN SYNDROME: Primary | ICD-10-CM

## 2023-11-24 PROCEDURE — 99214 OFFICE O/P EST MOD 30 MIN: CPT | Performed by: NURSE PRACTITIONER

## 2023-11-24 RX ORDER — TRAMADOL HYDROCHLORIDE 50 MG/1
50 TABLET ORAL 3 TIMES DAILY PRN
Qty: 90 TABLET | Refills: 2 | Status: SHIPPED | OUTPATIENT
Start: 2023-11-24

## 2023-11-24 RX ORDER — METHOCARBAMOL 500 MG/1
TABLET, FILM COATED ORAL
Qty: 180 TABLET | Refills: 3 | Status: SHIPPED | OUTPATIENT
Start: 2023-11-24

## 2023-11-24 NOTE — PROGRESS NOTES
Assessment:  1. Chronic pain syndrome    2. Lumbar spondylosis    3. Lumbar disc disease with radiculopathy    4. Uncomplicated opioid dependence ([address])        Plan:  Continue tramadol 50 mg 1 tablet 3 times daily as needed for moderate to severe pain. Prescription for this medication was sent to the pharmacy today with 2 refills. [place] Prescription Drug Monitoring Program report was reviewed and was appropriate       There are risks associated with opioid medications, including dependence, addiction and tolerance. The patient understands and agrees to use these medications only as prescribed. Potential side effects of the medications include, but are not limited to, constipation, drowsiness, addiction, impaired judgment and risk of fatal overdose if not taken as prescribed. The patient was warned against driving while taking sedation medications. Sharing medications is a felony. At this point in time, the patient is showing no signs of addiction, abuse, diversion or suicidal ideation. 2.  Continue gabapentin 600 mg one tablet 4 times daily and methocarbamol 500 mg 1 tablet twice daily as needed. He does   not require refills of these medications today. 3.  Advised patient to try using OTC melatonin and educated on basic sleep hygiene to help with his difficulty sleeping. I informed him that if this continues he should follow-up with his PCP to discuss other options, as we do not prescribe sleep aids at this office. 4.  Follow-up in 12 weeks or sooner if needed. The patient was agreeable to and verbalized understanding of the above plan. History of Present Illness: The patient is a 76 y.o. male with a history of coronary artery disease, prostate cancer, and uncomplicated opioid dependence last seen on 8/23/2023 who presents for a follow up office visit in regards to chronic pain secondary to lumbar spondylosis and disc disease with radiculopathy.  The patient currently reports pain in the lumbar region of the back that radiates down the anterior and posterior aspects of the right lower extremity, as well as the anterior aspect of the left lower extremity proximal to the knee. He reports pain that is constant and worse at night. He is currently rating the pain a 6/10 on the numeric pain rating scale. He describes the quality of his pain as burning, dull aching, numbness, and pins-and-needles. He also reports ongoing difficulty sleeping due to his lower extremity pain. Current pain medication regimen consists of tramadol 50 mg 3 times daily as needed, gabapentin 600 mg 4 times daily, and Robaxin 500 mg twice daily as needed. The patient reports that this regimen is providing at least 50% pain relief. The patient is reporting no side effects from this pain medication regimen. Pain Contract Signed: 5/25/23  Last Urine Drug Screen: 8/23/23    I have personally reviewed and/or updated the patient's past medical history, past surgical history, family history, social history, current medications, allergies, and vital signs today. Review of Systems:    Review of Systems   Constitutional:  Negative for unexpected weight change. HENT:  Negative for hearing loss. Eyes:  Negative for visual disturbance. Respiratory:  Negative for shortness of breath. Cardiovascular:  Negative for leg swelling. Gastrointestinal:  Negative for constipation. Endocrine: Negative for polyuria. Genitourinary:  Negative for difficulty urinating. Musculoskeletal:  Positive for gait problem and myalgias. Negative for joint swelling. Pain in extremity- lower back down rt leg   Skin:  Negative for rash. Neurological:  Negative for weakness and headaches. Psychiatric/Behavioral:  Negative for decreased concentration. All other systems reviewed and are negative.         Past Medical History:   Diagnosis Date    BCC (basal cell carcinoma)     in past left side of nose    Cancer ([address]) 1/2019 Chronic pain disorder     low back and down rle    Coronary artery disease     cabg x 1 2006    Hyperlipidemia     Malignant neoplasm prostate [facility])        Past Surgical History:   Procedure Laterality Date    COLONOSCOPY      CORONARY ARTERY BYPASS GRAFT  07/10/2006    LIMA to the diagonal artery    INGUINAL HERNIA REPAIR Right 1996    LAMINECTOMY  1/2020    CA LAPS SURG [id number] RPBIC RAD W/NRV SPARING ROBOT N/A 04/22/2019    Procedure: PROSTATECTOMY RADICAL W/ROBOTICS, B/L PELVIC NODE DISSECT;  Surgeon: Antoine Godoy MD;  Location: [facility];  Service: Urology    SKIN CANCER EXCISION  2016    left side nose    SPINE SURGERY  1/2020    THORACIC SPINE SURGERY      T3-T4    US GUIDED PROSTATE BIOPSY  02/28/2019       Family History   Problem Relation Age of Onset    Cancer Father         bladder    Lung cancer Father         metastatic       Social History     Occupational History    Not on file   Tobacco Use    Smoking status: Every Day     Packs/day: 0.50     Years: 35.00     Total pack years: 17.50     Types: Cigarettes    Smokeless tobacco: Never   Vaping Use    Vaping Use: Never used   Substance and Sexual Activity    Alcohol use: Not Currently    Drug use: Never    Sexual activity: Not Currently     Partners: Female     Birth control/protection: None         Current Outpatient Medications:     acetaminophen (TYLENOL) 500 mg tablet, Take 500 mg by mouth Three times a day, Disp: , Rfl:     aspirin (ECOTRIN LOW STRENGTH) 81 mg EC tablet, Take 81 mg by mouth daily, Disp: , Rfl:     azelastine (ASTELIN) 0.1 % nasal spray, 1 spray into each nostril 2 (two) times a day Use in each nostril as directed, Disp: 30 mL, Rfl: 0    benzonatate (TESSALON PERLES) 100 mg capsule, Take 1 capsule (100 mg total) by mouth 3 (three) times a day as needed for cough, Disp: 20 capsule, Rfl: 0    DULoxetine (CYMBALTA) 60 mg delayed release capsule, Take 1 capsule (60 mg total) by mouth daily, Disp: 90 capsule, Rfl: 3    fluticasone (FLONASE) 50 mcg/act nasal spray, 1 spray into each nostril daily, Disp: 18.2 mL, Rfl: 0    ibuprofen (MOTRIN) 200 mg tablet, Take by mouth every 6 (six) hours as needed for mild pain, Disp: , Rfl:     methocarbamol (ROBAXIN) 500 mg tablet, TAKE 1 TABLET TWICE DAILY, Disp: 180 tablet, Rfl: 3    rosuvastatin (CRESTOR) 20 MG tablet, Take 1 tablet (20 mg total) by mouth daily, Disp: 90 tablet, Rfl: 5    traMADol (ULTRAM) 50 mg tablet, Take 1 tablet (50 mg total) by mouth 3 (three) times a day as needed for moderate pain or severe pain, Disp: 90 tablet, Rfl: 2    gabapentin (NEURONTIN) 600 MG tablet, Take 1 tablet (600 mg total) by mouth 4 (four) times a day, Disp: 360 tablet, Rfl: 1    Allergies   Allergen Reactions    Atorvastatin        Physical Exam:    BP 96/68   Pulse 74   Ht 5' 8" (1.727 m)   Wt 53.5 kg (118 lb)   BMI 17.94 kg/m²     Constitutional:normal, well developed, well nourished, alert, in no distress and non-toxic and no overt pain behavior. Eyes:anicteric  HEENT:grossly intact  Neck:supple, symmetric, trachea midline and no masses   Pulmonary:even and unlabored  Cardiovascular:No edema or pitting edema present  Skin:Normal without rashes or lesions and well hydrated  Psychiatric:Mood and affect appropriate  Neurologic:Cranial Nerves II-XII grossly intact  Musculoskeletal:normal gait. Imaging  No orders to display         No orders of the defined types were placed in this encounter.

## 2023-11-24 NOTE — PATIENT INSTRUCTIONS
Opioid Safety   WHAT YOU NEED TO KNOW:   An opioid medicine is used to treat pain. Examples are oxycodone, morphine, fentanyl, or codeine. Pain control and management may help you rest, heal, and return to your daily activities. You and your family will receive information about how to manage your pain at home. The instructions will include what to do if you have side effects as your pain is managed. You will get information on how to handle opioid medicine safely. You will also get suggestions on how to control pain without opioids. It is important to follow all instructions so your pain is managed effectively. DISCHARGE INSTRUCTIONS:   Call your local emergency number (911 in the ), or have someone else call if:   You have a seizure. You cannot be woken. You have trouble staying awake and your breathing is slow or shallow. Your speech is slurred, or you are confused. You are dizzy or stumble when you walk. Call your doctor, or have someone close to you call if:   You are extremely drowsy, or you have trouble staying awake or speaking. You have pale or clammy skin. You have blue fingernails or lips. Your heartbeat is slower than normal.    You cannot stop vomiting. You have questions or concerns about your condition or care. Use opioids safely:   Take prescribed opioids exactly as directed. Opioids come with directions based on the kind and how it is given. Talk to your healthcare provider or a pharmacist if you have any questions. Do not take more than the recommended amount. Too much can cause a life-threatening overdose. Do not continue to take it after your pain stops. You may develop tolerance. This means you keep needing higher doses to get the same effect. You may also develop opioid use disorder. This means you are not able to control your opioid use. Do not give opioids to others or take opioids that belong to someone else.   The kind or amount one person takes may not be right for another. The person you share them with may also be taking medicines that do not mix with opioids. The person may drink alcohol or use other drugs that can cause life-threatening problems when mixed with opioids. Do not mix opioids with other medicines or alcohol. The combination can cause an overdose, or cause you to stop breathing. Alcohol, sleeping pills, and medicines such as antihistamines can make you sleepy. A combination with opioids can lead to a coma. Do not drive or operate heavy machinery after you use an opioid. You may feel drowsy or have trouble concentrating. You can injure yourself or others if you drive or use heavy machinery when you are not alert. Your provider or pharmacist can tell you how long to wait after a dose before you do these activities. Talk to your healthcare provider if you have any side effects. Side effects include nausea, sleepiness, itching, and trouble thinking clearly. Your provider may need to make changes to the kind or amount of opioid you are taking. Your provider can also help you find ways to prevent or relieve side effects. Manage constipation:  Constipation is the most common side effect of opioid medicine. Constipation is when you have hard, dry bowel movements, or you go longer than usual between bowel movements. Tell your healthcare provider about all changes in your bowel movements while you are taking opioids. Your provider may recommend laxative medicine to help you have a bowel movement. Your provider may also change the kind of opioid you are taking, or change when you take it. The following are more ways you can prevent or relieve constipation:  Drink liquids as directed. You may need to drink extra liquids to help soften and move your bowels. Ask how much liquid to drink each day and which liquids are best for you. Eat high-fiber foods. This may help decrease constipation by adding bulk to your bowel movements.  High-fiber foods include fruits, vegetables, whole-grain breads and cereals, and beans. Your healthcare provider or dietitian can help you create a high-fiber meal plan. Your provider may also recommend a fiber supplement if you cannot get enough fiber from food. Exercise regularly. Regular physical activity can help stimulate your intestines. Walking is a good exercise to prevent or relieve constipation. Ask which exercises are best for you. Schedule a time each day to have a bowel movement. This may help train your body to have regular bowel movements. Bend forward while you are on the toilet to help move the bowel movement out. Sit on the toilet for at least 10 minutes, even if you do not have a bowel movement. Store opioids safely:   Store opioids where others cannot easily get them. Keep them in a locked cabinet or secure area. Do not  keep them in a purse or other bag you carry with you. A person may be looking for something else and find the opioids. Make sure opioids are stored out of the reach of children. A child can easily overdose on opioids. Opioids may look like candy to a small child. The best way to dispose of opioids: The laws vary by country and area. In the [facility], the best way is to return the opioids through a take-back program. This program is offered by the [employer] ([employer] The following are options for using the program:  Take the opioids to a WANG collection site. The site is often a law enforcement center. Call your local law enforcement center for scheduled take-back days in your area. You will be given information on where to go if the collection site is in a different location. Take the opioids to an approved pharmacy or hospital.  A pharmacy or hospital may be set up as a collection site. You will need to ask if it is a WANG collection site if you were not directed there.  A pharmacy or doctor's office may not be able to take back opioids unless it is a WANG site. Use a mail-back system. This means you are given containers to put the opioids into. You will then mail them in the containers. Use a take-back drop box. This is a place to leave the opioids at any time. People and animals will not be able to get into the box. Your local law enforcement agency can tell you where to find a drop box in your area. Other safe ways to dispose of opioids: The medicine may come with disposal instructions. The instructions may vary depending on the brand of medicine you are using. Instructions may come in a Medication Guide, but not every medicine has one. You may instead get instructions from your pharmacy or doctor. Follow instructions carefully. The following are general guidelines to follow:  Find out if you can flush the opioid. Some opioids can be flushed down the toilet or poured into the sink. You will need to contact authorities in your area to see if this is an option for you. The FDA also offers a list of medicines that are safe to flush down the toilet. You can check the list if you cannot get the information for your local area. Ask your waste management company about rules for putting opioids in the trash. The company will be able to give you specific directions. Scratch out personal information on the original medicine label so it cannot be read. Then put it in the trash. Do not label the trash or put any information on it about the opioids. It should look like regular household trash so no one is tempted to look for the opioids. Keep the trash out of the reach of children and animals. Always make sure trash is secure. Talk to officials if you live in a facility. If you live in a nursing home or assisted living center, talk to an official. The person will know the rules for your area. Other ways to manage pain:   Ask your healthcare provider about non-opioid medicines to control pain.   Some medicines may even work better than opioids, depending on the cause of your pain. Nonprescription medicines include NSAIDs (such as ibuprofen) and acetaminophen. Prescription medicines include muscle relaxers, antidepressants, and steroids. Pain may be managed without any medicines. Some ways to relieve pain include massage, aromatherapy, or meditation. Physical or occupational therapy may also help. Follow up with your doctor or pain specialist as directed: You may need to have your dose adjusted. Your doctor or pain specialist can also help you find ways to manage pain without opioids. Write down your questions so you remember to ask them during your visits. For more information:   Drug Enforcement Administration  [address] [facility] , [address]  Phone: 9- [phone]  Web Address: wise.io.[employer]. Scrib[employer]3seventy.gov/drug_disposal/    [address] [facility] [address] and Drug Administration  [address]  [facility] , [address]  Phone: 2- 001 - [phone]  Web Address: http://[employer]/  © Copyright Thana Givens 2023 Information is for End User's use only and may not be sold, redistributed or otherwise used for commercial purposes. The above information is an  only. It is not intended as medical advice for individual conditions or treatments. Talk to your doctor, nurse or pharmacist before following any medical regimen to see if it is safe and effective for you.

## 2024-01-09 ENCOUNTER — APPOINTMENT (OUTPATIENT)
Dept: LAB | Facility: CLINIC | Age: 69
End: 2024-01-09
Payer: MEDICARE

## 2024-01-09 DIAGNOSIS — Z12.5 SCREENING FOR PROSTATE CANCER: ICD-10-CM

## 2024-01-09 DIAGNOSIS — E78.2 MIXED HYPERLIPIDEMIA: ICD-10-CM

## 2024-01-09 DIAGNOSIS — C61 PROSTATE CANCER (HCC): ICD-10-CM

## 2024-01-09 DIAGNOSIS — E78.5 DYSLIPIDEMIA: ICD-10-CM

## 2024-01-09 DIAGNOSIS — I25.10 CORONARY ARTERY DISEASE INVOLVING NATIVE CORONARY ARTERY OF NATIVE HEART WITHOUT ANGINA PECTORIS: ICD-10-CM

## 2024-01-09 DIAGNOSIS — Z12.2 SCREENING FOR LUNG CANCER: ICD-10-CM

## 2024-01-09 DIAGNOSIS — G89.4 CHRONIC PAIN SYNDROME: ICD-10-CM

## 2024-01-09 LAB
ALBUMIN SERPL BCP-MCNC: 4.4 G/DL (ref 3.5–5)
ALP SERPL-CCNC: 62 U/L (ref 34–104)
ALT SERPL W P-5'-P-CCNC: 18 U/L (ref 7–52)
ANION GAP SERPL CALCULATED.3IONS-SCNC: 8 MMOL/L
AST SERPL W P-5'-P-CCNC: 17 U/L (ref 13–39)
BASOPHILS # BLD AUTO: 0.1 THOUSANDS/ÂΜL (ref 0–0.1)
BASOPHILS NFR BLD AUTO: 1 % (ref 0–1)
BILIRUB SERPL-MCNC: 0.55 MG/DL (ref 0.2–1)
BUN SERPL-MCNC: 17 MG/DL (ref 5–25)
CALCIUM SERPL-MCNC: 9.5 MG/DL (ref 8.4–10.2)
CHLORIDE SERPL-SCNC: 102 MMOL/L (ref 96–108)
CHOLEST SERPL-MCNC: 122 MG/DL
CO2 SERPL-SCNC: 29 MMOL/L (ref 21–32)
CREAT SERPL-MCNC: 0.85 MG/DL (ref 0.6–1.3)
EOSINOPHIL # BLD AUTO: 0.51 THOUSAND/ÂΜL (ref 0–0.61)
EOSINOPHIL NFR BLD AUTO: 5 % (ref 0–6)
ERYTHROCYTE [DISTWIDTH] IN BLOOD BY AUTOMATED COUNT: 13.3 % (ref 11.6–15.1)
EST. AVERAGE GLUCOSE BLD GHB EST-MCNC: 117 MG/DL
GFR SERPL CREATININE-BSD FRML MDRD: 89 ML/MIN/1.73SQ M
GLUCOSE P FAST SERPL-MCNC: 102 MG/DL (ref 65–99)
HBA1C MFR BLD: 5.7 %
HCT VFR BLD AUTO: 45.7 % (ref 36.5–49.3)
HDLC SERPL-MCNC: 54 MG/DL
HGB BLD-MCNC: 14.6 G/DL (ref 12–17)
IMM GRANULOCYTES # BLD AUTO: 0.02 THOUSAND/UL (ref 0–0.2)
IMM GRANULOCYTES NFR BLD AUTO: 0 % (ref 0–2)
LDLC SERPL CALC-MCNC: 48 MG/DL (ref 0–100)
LYMPHOCYTES # BLD AUTO: 2.59 THOUSANDS/ÂΜL (ref 0.6–4.47)
LYMPHOCYTES NFR BLD AUTO: 27 % (ref 14–44)
MCH RBC QN AUTO: 30.3 PG (ref 26.8–34.3)
MCHC RBC AUTO-ENTMCNC: 31.9 G/DL (ref 31.4–37.4)
MCV RBC AUTO: 95 FL (ref 82–98)
MONOCYTES # BLD AUTO: 0.9 THOUSAND/ÂΜL (ref 0.17–1.22)
MONOCYTES NFR BLD AUTO: 9 % (ref 4–12)
NEUTROPHILS # BLD AUTO: 5.58 THOUSANDS/ÂΜL (ref 1.85–7.62)
NEUTS SEG NFR BLD AUTO: 58 % (ref 43–75)
NRBC BLD AUTO-RTO: 0 /100 WBCS
PLATELET # BLD AUTO: 201 THOUSANDS/UL (ref 149–390)
PMV BLD AUTO: 11.1 FL (ref 8.9–12.7)
POTASSIUM SERPL-SCNC: 4.3 MMOL/L (ref 3.5–5.3)
PROT SERPL-MCNC: 6.6 G/DL (ref 6.4–8.4)
PSA SERPL-MCNC: <0.01 NG/ML (ref 0–4)
RBC # BLD AUTO: 4.82 MILLION/UL (ref 3.88–5.62)
SODIUM SERPL-SCNC: 139 MMOL/L (ref 135–147)
TRIGL SERPL-MCNC: 98 MG/DL
WBC # BLD AUTO: 9.7 THOUSAND/UL (ref 4.31–10.16)

## 2024-01-09 PROCEDURE — 36415 COLL VENOUS BLD VENIPUNCTURE: CPT

## 2024-01-09 PROCEDURE — 85025 COMPLETE CBC W/AUTO DIFF WBC: CPT

## 2024-01-09 PROCEDURE — 83036 HEMOGLOBIN GLYCOSYLATED A1C: CPT

## 2024-01-09 PROCEDURE — 80061 LIPID PANEL: CPT

## 2024-01-09 PROCEDURE — G0103 PSA SCREENING: HCPCS

## 2024-01-09 PROCEDURE — 80053 COMPREHEN METABOLIC PANEL: CPT

## 2024-01-18 ENCOUNTER — OFFICE VISIT (OUTPATIENT)
Dept: FAMILY MEDICINE CLINIC | Facility: CLINIC | Age: 69
End: 2024-01-18
Payer: MEDICARE

## 2024-01-18 VITALS
DIASTOLIC BLOOD PRESSURE: 70 MMHG | BODY MASS INDEX: 18.19 KG/M2 | HEART RATE: 85 BPM | HEIGHT: 68 IN | WEIGHT: 120 LBS | SYSTOLIC BLOOD PRESSURE: 110 MMHG | TEMPERATURE: 95.1 F | OXYGEN SATURATION: 97 %

## 2024-01-18 DIAGNOSIS — C61 PROSTATE CANCER (HCC): ICD-10-CM

## 2024-01-18 DIAGNOSIS — Z23 ENCOUNTER FOR IMMUNIZATION: ICD-10-CM

## 2024-01-18 DIAGNOSIS — R73.9 ELEVATED BLOOD SUGAR: ICD-10-CM

## 2024-01-18 DIAGNOSIS — Z11.59 NEED FOR HEPATITIS C SCREENING TEST: ICD-10-CM

## 2024-01-18 DIAGNOSIS — M51.16 LUMBAR DISC DISEASE WITH RADICULOPATHY: ICD-10-CM

## 2024-01-18 DIAGNOSIS — G89.4 CHRONIC PAIN SYNDROME: ICD-10-CM

## 2024-01-18 DIAGNOSIS — Z00.00 MEDICARE ANNUAL WELLNESS VISIT, SUBSEQUENT: Primary | ICD-10-CM

## 2024-01-18 DIAGNOSIS — R73.03 PREDIABETES: ICD-10-CM

## 2024-01-18 DIAGNOSIS — G96.11 DURAL TEAR: ICD-10-CM

## 2024-01-18 DIAGNOSIS — I25.10 CORONARY ARTERY DISEASE INVOLVING NATIVE CORONARY ARTERY OF NATIVE HEART WITHOUT ANGINA PECTORIS: ICD-10-CM

## 2024-01-18 DIAGNOSIS — E78.5 DYSLIPIDEMIA: ICD-10-CM

## 2024-01-18 DIAGNOSIS — Z79.891 LONG-TERM CURRENT USE OF OPIATE ANALGESIC: ICD-10-CM

## 2024-01-18 PROBLEM — I95.0 IDIOPATHIC HYPOTENSION: Status: RESOLVED | Noted: 2021-11-02 | Resolved: 2024-01-18

## 2024-01-18 PROBLEM — Z01.810 PRE-OPERATIVE CARDIOVASCULAR EXAMINATION: Status: RESOLVED | Noted: 2019-04-02 | Resolved: 2024-01-18

## 2024-01-18 PROBLEM — F11.20 UNCOMPLICATED OPIOID DEPENDENCE (HCC): Status: RESOLVED | Noted: 2022-04-04 | Resolved: 2024-01-18

## 2024-01-18 PROBLEM — M54.9 MID BACK PAIN: Status: RESOLVED | Noted: 2022-04-04 | Resolved: 2024-01-18

## 2024-01-18 PROCEDURE — 99213 OFFICE O/P EST LOW 20 MIN: CPT | Performed by: INTERNAL MEDICINE

## 2024-01-18 PROCEDURE — G0438 PPPS, INITIAL VISIT: HCPCS | Performed by: INTERNAL MEDICINE

## 2024-01-18 NOTE — PATIENT INSTRUCTIONS
Medicare Preventive Visit Patient Instructions  Thank you for completing your Welcome to Medicare Visit or Medicare Annual Wellness Visit today. Your next wellness visit will be due in one year (1/18/2025).  The screening/preventive services that you may require over the next 5-10 years are detailed below. Some tests may not apply to you based off risk factors and/or age. Screening tests ordered at today's visit but not completed yet may show as past due. Also, please note that scanned in results may not display below.  Preventive Screenings:  Service Recommendations Previous Testing/Comments   Colorectal Cancer Screening  Colonoscopy    Fecal Occult Blood Test (FOBT)/Fecal Immunochemical Test (FIT)  Fecal DNA/Cologuard Test  Flexible Sigmoidoscopy Age: 45-75 years old   Colonoscopy: every 10 years (May be performed more frequently if at higher risk)  OR  FOBT/FIT: every 1 year  OR  Cologuard: every 3 years  OR  Sigmoidoscopy: every 5 years  Screening may be recommended earlier than age 45 if at higher risk for colorectal cancer. Also, an individualized decision between you and your healthcare provider will decide whether screening between the ages of 76-85 would be appropriate. Colonoscopy: 07/15/2021  FOBT/FIT: 07/15/2021  Cologuard: 07/15/2021  Sigmoidoscopy: 07/15/2021    Screening Current     Prostate Cancer Screening Individualized decision between patient and health care provider in men between ages of 55-69   Medicare will cover every 12 months beginning on the day after your 50th birthday PSA: <0.01 ng/mL     History Prostate Cancer     Hepatitis C Screening Once for adults born between 1945 and 1965  More frequently in patients at high risk for Hepatitis C Hep C Antibody: Not on file        Diabetes Screening 1-2 times per year if you're at risk for diabetes or have pre-diabetes Fasting glucose: 102 mg/dL (1/9/2024)  A1C: 5.7 % (1/9/2024)  Screening Current   Cholesterol Screening Once every 5 years if you  don't have a lipid disorder. May order more often based on risk factors. Lipid panel: 01/09/2024  Screening Not Indicated  History Lipid Disorder      Other Preventive Screenings Covered by Medicare:  Abdominal Aortic Aneurysm (AAA) Screening: covered once if your at risk. You're considered to be at risk if you have a family history of AAA or a male between the age of 65-75 who smoking at least 100 cigarettes in your lifetime.  Lung Cancer Screening: covers low dose CT scan once per year if you meet all of the following conditions: (1) Age 55-77; (2) No signs or symptoms of lung cancer; (3) Current smoker or have quit smoking within the last 15 years; (4) You have a tobacco smoking history of at least 20 pack years (packs per day x number of years you smoked); (5) You get a written order from a healthcare provider.  Glaucoma Screening: covered annually if you're considered high risk: (1) You have diabetes OR (2) Family history of glaucoma OR (3)  aged 50 and older OR (4)  American aged 65 and older  Osteoporosis Screening: covered every 2 years if you meet one of the following conditions: (1) Have a vertebral abnormality; (2) On glucocorticoid therapy for more than 3 months; (3) Have primary hyperparathyroidism; (4) On osteoporosis medications and need to assess response to drug therapy.  HIV Screening: covered annually if you're between the age of 15-65. Also covered annually if you are younger than 15 and older than 65 with risk factors for HIV infection. For pregnant patients, it is covered up to 3 times per pregnancy.    Immunizations:  Immunization Recommendations   Influenza Vaccine Annual influenza vaccination during flu season is recommended for all persons aged >= 6 months who do not have contraindications   Pneumococcal Vaccine   * Pneumococcal conjugate vaccine = PCV13 (Prevnar 13), PCV15 (Vaxneuvance), PCV20 (Prevnar 20)  * Pneumococcal polysaccharide vaccine = PPSV23 (Pneumovax)  Adults 19-63 yo with certain risk factors or if 65+ yo  If never received any pneumonia vaccine: recommend Prevnar 20 (PCV20)  Give PCV20 if previously received 1 dose of PCV13 or PPSV23   Hepatitis B Vaccine 3 dose series if at intermediate or high risk (ex: diabetes, end stage renal disease, liver disease)   Respiratory syncytial virus (RSV) Vaccine - COVERED BY MEDICARE PART D  * RSVPreF3 (Arexvy) CDC recommends that adults 60 years of age and older may receive a single dose of RSV vaccine using shared clinical decision-making (SCDM)   Tetanus (Td) Vaccine - COST NOT COVERED BY MEDICARE PART B Following completion of primary series, a booster dose should be given every 10 years to maintain immunity against tetanus. Td may also be given as tetanus wound prophylaxis.   Tdap Vaccine - COST NOT COVERED BY MEDICARE PART B Recommended at least once for all adults. For pregnant patients, recommended with each pregnancy.   Shingles Vaccine (Shingrix) - COST NOT COVERED BY MEDICARE PART B  2 shot series recommended in those 19 years and older who have or will have weakened immune systems or those 50 years and older     Health Maintenance Due:      Topic Date Due   • Hepatitis C Screening  Never done   • Colorectal Cancer Screening  07/15/2024     Immunizations Due:      Topic Date Due   • Hepatitis A Vaccine (1 of 2 - Risk 2-dose series) Never done   • Pneumococcal Vaccine: 65+ Years (2 - PCV) 12/05/2007   • COVID-19 Vaccine (3 - 2023-24 season) 09/01/2023     Advance Directives   What are advance directives?  Advance directives are legal documents that state your wishes and plans for medical care. These plans are made ahead of time in case you lose your ability to make decisions for yourself. Advance directives can apply to any medical decision, such as the treatments you want, and if you want to donate organs.   What are the types of advance directives?  There are many types of advance directives, and each state has  rules about how to use them. You may choose a combination of any of the following:  Living will:  This is a written record of the treatment you want. You can also choose which treatments you do not want, which to limit, and which to stop at a certain time. This includes surgery, medicine, IV fluid, and tube feedings.   Durable power of  for healthcare (DPAHC):  This is a written record that states who you want to make healthcare choices for you when you are unable to make them for yourself. This person, called a proxy, is usually a family member or a friend. You may choose more than 1 proxy.  Do not resuscitate (DNR) order:  A DNR order is used in case your heart stops beating or you stop breathing. It is a request not to have certain forms of treatment, such as CPR. A DNR order may be included in other types of advance directives.  Medical directive:  This covers the care that you want if you are in a coma, near death, or unable to make decisions for yourself. You can list the treatments you want for each condition. Treatment may include pain medicine, surgery, blood transfusions, dialysis, IV or tube feedings, and a ventilator (breathing machine).  Values history:  This document has questions about your views, beliefs, and how you feel and think about life. This information can help others choose the care that you would choose.  Why are advance directives important?  An advance directive helps you control your care. Although spoken wishes may be used, it is better to have your wishes written down. Spoken wishes can be misunderstood, or not followed. Treatments may be given even if you do not want them. An advance directive may make it easier for your family to make difficult choices about your care.   Cigarette Smoking and Your Health   Risks to your health if you smoke:  Nicotine and other chemicals found in tobacco damage every cell in your body. Even if you are a light smoker, you have an increased risk  for cancer, heart disease, and lung disease. If you are pregnant or have diabetes, smoking increases your risk for complications.   Benefits to your health if you stop smoking:   You decrease respiratory symptoms such as coughing, wheezing, and shortness of breath.   You reduce your risk for cancers of the lung, mouth, throat, kidney, bladder, pancreas, stomach, and cervix. If you already have cancer, you increase the benefits of chemotherapy. You also reduce your risk for cancer returning or a second cancer from developing.   You reduce your risk for heart disease, blood clots, heart attack, and stroke.   You reduce your risk for lung infections, and diseases such as pneumonia, asthma, chronic bronchitis, and emphysema.  Your circulation improves. More oxygen can be delivered to your body. If you have diabetes, you lower your risk for complications, such as kidney, artery, and eye diseases. You also lower your risk for nerve damage. Nerve damage can lead to amputations, poor vision, and blindness.  You improve your body's ability to heal and to fight infections.  For more information and support to stop smoking:   [employer]  Phone: 8- 830 - [phone]  Web Address: www."[employer]  Underweight  Underweight is defined as having a body mass index (BMI) of less than 18.5 kg/m2   Anorexia  is a loss of appetite, decreased food intake, or both. Your appetite naturally decreases as you get older. You also get full faster than you used to. This occurs because your body needs less energy. Other body changes can also lead to a decreased appetite. Even though some appetite loss is normal, you still need to get enough calories and nutrients to keep you healthy. You can start to lose too much weight if you do not eat as much food as your body needs. Unwanted weight loss can cause health problems, or worsen health problems you already have. You can also become dehydrated if you do not drink enough liquid.  How to eat  healthy and get enough nutrients:   Choose healthy foods.  Eat a variety of fruits, vegetables, whole grains, low-fat dairy foods, lean meats, and other protein foods. Limit foods high in fat, sugar, and salt. Limit or avoid alcohol as directed. Work with a dietitian to help you plan your meals if you need to follow a special diet. A dietitian can also teach you how to modify foods if you have trouble chewing or swallowing.   Snack on healthy foods between meals  if you only eat a small amount during meals. Snacks provide extra healthy nutrients and calories between meals. Examples include fruit, cheese, and whole grain crackers.   Drink liquids as directed  to avoid dehydration. Drink liquids between meals if they cause you to get full too quickly during meals. Ask how much liquid to drink each day and which liquids are best for you.   Use herbs, spices, and flavor enhancers to add flavor to foods.  Avoid using herbs and spice blends that also contain sodium. Ask your healthcare provider or dietitian about flavor enhancers. Flavor enhancers with ham, natural alex, and roast beef flavors can also be sprinkled on food to add flavor.   Share meals with others as often as you can.  Eating with others may help you to eat better during meal time. Ask family members, neighbors, or friends to join you for lunch. There are also senior centers where you can meet people, and share meals with them.   Ask family and friends for help  with shopping or preparing foods. Ask for a ride to the grocery store, if needed.      Narcotic (Opioid) Safety    Use narcotics safely:  Take prescribed narcotics exactly as directed  Do not give narcotics to others or take narcotics that belong to someone else  Do not mix narcotics without medicines or alcohol  Do not drive or operate heavy machinery after you take the narcotic  Monitor for side effects and notify your healthcare provider if you experienced side effects such as nausea,  sleepiness, itching, or trouble thinking clearly.    Manage constipation:    Constipation is the most common side effect of narcotic medicine. Constipation is when you have hard, dry bowel movements, or you go longer than usual between bowel movements. Tell your healthcare provider about all changes in your bowel movements while you are taking narcotics. He or she may recommend laxative medicine to help you have a bowel movement. He or she may also change the kind of narcotic you are taking, or change when you take it. The following are more ways you can prevent or relieve constipation:    Drink liquids as directed.  You may need to drink extra liquids to help soften and move your bowels. Ask how much liquid to drink each day and which liquids are best for you.  Eat high-fiber foods.  This may help decrease constipation by adding bulk to your bowel movements. High-fiber foods include fruits, vegetables, whole-grain breads and cereals, and beans. Your healthcare provider or dietitian can help you create a high-fiber meal plan. Your provider may also recommend a fiber supplement if you cannot get enough fiber from food.  Exercise regularly.  Regular physical activity can help stimulate your intestines. Walking is a good exercise to prevent or relieve constipation. Ask which exercises are best for you.  Schedule a time each day to have a bowel movement.  This may help train your body to have regular bowel movements. Bend forward while you are on the toilet to help move the bowel movement out. Sit on the toilet for at least 10 minutes, even if you do not have a bowel movement.    Store narcotics safely:   Store narcotics where others cannot easily get them.  Keep them in a locked cabinet or secure area. Do not  keep them in a purse or other bag you carry with you. A person may be looking for something else and find the narcotics.  Make sure narcotics are stored out of the reach of children.  A child can easily overdose  on narcotics. Narcotics may look like candy to a small child.    The best way to dispose of narcotics:      The laws vary by country and area. In the [place], the best way is to return the narcotics through a take-back program. This program is offered by the [place] Drug Enforcement Agency (WANG). The following are options for using the program:  Take the narcotics to a WANG collection site.  The site is often a law enforcement center. Call your local law enforcement center for scheduled take-back days in your area. You will be given information on where to go if the collection site is in a different location.  Take the narcotics to an approved pharmacy or hospital.  A pharmacy or hospital may be set up as a collection site. You will need to ask if it is a WANG collection site if you were not directed there. A pharmacy or doctor's office may not be able to take back narcotics unless it is a WANG site.  Use a mail-back system.  This means you are given containers to put the narcotics into. You will then mail them in the containers.  Use a take-back drop box.  This is a place to leave the narcotics at any time. People and animals will not be able to get into the box. Your local law enforcement agency can tell you where to find a drop box in your area.    Other ways to manage pain:   Ask your healthcare provider about non-narcotic medicines to control pain.  Nonprescription medicines include NSAIDs (such as ibuprofen) and acetaminophen. Prescription medicines include muscle relaxers, antidepressants, and steroids.  Pain may be managed without any medicines.  Some ways to relieve pain include massage, aromatherapy, or meditation. Physical or occupational therapy may also help.    For more information:   Drug Enforcement Administration  [address] [place] [address]  Phone: 2- 742 - [phone]  Web Address: https://www.[employer]iversion.[email or web address].gov/drug_disposal/    [place] Food and Drug Administration  [address] [place] [address]  Phone: 4- 676 - 046-7017  Web Address: http://www.fda.gov     © Copyright [employer] 2018 Information is for End User's use only and may not be sold, redistributed or otherwise used for commercial purposes. All illustrations and images included in CareNotes® are the copyrighted property of [employer] or [employer]

## 2024-01-18 NOTE — ASSESSMENT & PLAN NOTE
Chronic pain issues, prior time as had foot drop.  Has had back surgery including thoracic spine for syringomyelia.  Nonetheless uses tramadol and gabapentin at this time, follows with pain management.  Injections have failed in the past.

## 2024-01-18 NOTE — ASSESSMENT & PLAN NOTE
Patient has no chest pain or shortness of breath.  Remains on statin therapy with optimal control of cholesterol and aspirin.  Blood pressure only 100 systolics so not taking beta-blocker or ACE inhibitor.

## 2024-01-18 NOTE — PROGRESS NOTES
" Assessment and Plan:     Problem List Items Addressed This Visit       Coronary artery disease involving native coronary artery of native heart without angina pectoris     Patient has no chest pain or shortness of breath.  Remains on statin therapy with optimal control of cholesterol and aspirin.  Blood pressure only 100 systolics so not taking beta-blocker or ACE inhibitor.         Dyslipidemia     Continue current medical therapy         Relevant Orders    Lipid Panel with Direct LDL reflex    Dural tear     Chronic issue, he states \"no surgeon wants to touch my back\".         Long-term current use of opiate analgesic     Remains in the care of pain management         RESOLVED: Chronic pain syndrome    Relevant Orders    CBC and differential    Comprehensive metabolic panel    Lumbar disc disease with radiculopathy     Chronic pain issues, prior time as had foot drop.  Has had back surgery including thoracic spine for syringomyelia.  Nonetheless uses tramadol and gabapentin at this time, follows with pain management.  Injections have failed in the past.         Relevant Orders    CBC and differential    Comprehensive metabolic panel    Elevated blood sugar     Recent A1c in prediabetic range.  No medications for now.         Relevant Orders    Hemoglobin A1C    Prostate cancer (HCC)     Remains in remission with PSA undetectable.         Medicare annual wellness visit, subsequent - Primary     Discussed need for living will and DURABLE POWER OF .  He actually has paperwork done just needs to get to a  to notarized.  Discussed tobacco cessation which she declines.  Reviewed screening studies with him, and will review again at July when he will be due for his low-dose CAT scan.          Other Visit Diagnoses       Prediabetes        Relevant Orders    Hemoglobin A1C    Need for hepatitis C screening test        Encounter for immunization                Tobacco Cessation Counseling: Tobacco cessation " counseling was provided. The patient is sincerely urged to quit consumption of tobacco. He is not ready to quit tobacco. Medication options discussed. Side effects of medication not discussed. Patient refused medication.       Preventive health issues were discussed with patient, and age appropriate screening tests were ordered as noted in patient's After Visit Summary.  Personalized health advice and appropriate referrals for health education or preventive services given if needed, as noted in patient's After Visit Summary.     History of Present Illness:     Patient presents for a Medicare Wellness Visit    Patient presents for their annual medical wellness visit.  Reviewed medical intake questionnaire.  Discussed living will and DURABLE POWER OF .  Discussed importance of these 2 documents.  Reviewed the various screening modalities the patient was due for.  Reviewed home safety as well as depression and risk of falling.  Through shared medical decision making move forward with testing or blood work as ordered.  The patient presents with his chronic low back pain.  Continues to smoke also about 1/2 pack a day.  Heavy smoker earlier in life, clearly is over greater than 40 pack years..  Goes for his annual low-dose CT which will be due in July of this year.  Up-to-date on vaccines with exception of shingles, which she will look into.  Discussed opportunities for the [facility] agency on aging with him as well.         Patient Care Team:  Jose Ansari DO as PCP - General (Internal Medicine)     Review of Systems:     Review of Systems   Constitutional:  Negative for chills and fever.   HENT:  Negative for rhinorrhea and sore throat.    Eyes:  Negative for visual disturbance.   Respiratory:  Negative for cough and shortness of breath.    Cardiovascular:  Negative for chest pain and leg swelling.   Gastrointestinal:  Negative for abdominal pain, diarrhea, nausea and vomiting.   Genitourinary:  Negative for  dysuria.   Musculoskeletal:  Positive for arthralgias and back pain. Negative for myalgias.   Skin:  Negative for rash.   Neurological:  Negative for dizziness and headaches.   Psychiatric/Behavioral:  Negative for confusion.    All other systems reviewed and are negative.       Problem List:     Patient Active Problem List   Diagnosis    Coronary artery disease involving native coronary artery of native heart without angina pectoris    Dyslipidemia    Dural tear    Long-term current use of opiate analgesic    Lumbar disc disease with radiculopathy    Lumbar spondylosis    Elevated blood sugar    Prostate cancer (HCC)    Medicare annual wellness visit, subsequent      Past Medical and Surgical History:     Past Medical History:   Diagnosis Date    BCC (basal cell carcinoma)     in past left side of nose    Cancer (HCC) 1/2019    Chronic pain disorder     low back and down rle    Coronary artery disease     cabg x 1 2006    Hyperlipidemia     Malignant neoplasm prostate (HCC)      Past Surgical History:   Procedure Laterality Date    COLONOSCOPY      CORONARY ARTERY BYPASS GRAFT  07/10/2006    LIMA to the diagonal artery    INGUINAL HERNIA REPAIR Right 1996    LAMINECTOMY  1/2020    MD LAPS SURG [id number] RPBIC RAD W/NRV SPARING ROBOT N/A 04/22/2019    Procedure: PROSTATECTOMY RADICAL W/ROBOTICS, B/L PELVIC NODE DISSECT;  Surgeon: Taran Adam MD;  Location: [facility];  Service: Urology    SKIN CANCER EXCISION  2016    left side nose    SPINE SURGERY  1/2020    THORACIC SPINE SURGERY      T3-T4, removal of an arachnoid cyst.    US GUIDED PROSTATE BIOPSY  02/28/2019      Family History:     Family History   Problem Relation Age of Onset    Cancer Father         bladder    Lung cancer Father         metastatic      Social History:     Social History     Socioeconomic History    Marital status: /Civil Union     Spouse name: None    Number of children: None    Years of education: None    Highest education  level: None   Occupational History    None   Tobacco Use    Smoking status: Every Day     Current packs/day: 0.50     Average packs/day: 0.8 packs/day for 30.0 years (25.5 ttl pk-yrs)     Types: Cigarettes     Start date: 1994    Smokeless tobacco: Never   Vaping Use    Vaping status: Never Used   Substance and Sexual Activity    Alcohol use: Not Currently    Drug use: Never    Sexual activity: Not Currently     Partners: Female     Birth control/protection: None   Other Topics Concern    None   Social History Narrative    None     Social Determinants of Health     Financial Resource Strain: Low Risk  (1/12/2024)    Overall Financial Resource Strain (CARDIA)     Difficulty of Paying Living Expenses: Not very hard   Food Insecurity: Not on file   Transportation Needs: No Transportation Needs (1/12/2024)    PRAPARE - Transportation     Lack of Transportation (Medical): No     Lack of Transportation (Non-Medical): No   Physical Activity: Not on file   Stress: Not on file   Social Connections: Not on file   Intimate Partner Violence: Not on file   Housing Stability: Not on file      Medications and Allergies:     Current Outpatient Medications   Medication Sig Dispense Refill    acetaminophen (TYLENOL) 500 mg tablet Take 500 mg by mouth Three times a day      aspirin (ECOTRIN LOW STRENGTH) 81 mg EC tablet Take 81 mg by mouth daily      DULoxetine (CYMBALTA) 60 mg delayed release capsule Take 1 capsule (60 mg total) by mouth daily 90 capsule 3    gabapentin (NEURONTIN) 600 MG tablet Take 1 tablet (600 mg total) by mouth 4 (four) times a day 360 tablet 1    ibuprofen (MOTRIN) 200 mg tablet Take by mouth every 6 (six) hours as needed for mild pain      methocarbamol (ROBAXIN) 500 mg tablet TAKE 1 TABLET TWICE DAILY 180 tablet 3    rosuvastatin (CRESTOR) 20 MG tablet Take 1 tablet (20 mg total) by mouth daily 90 tablet 5    traMADol (ULTRAM) 50 mg tablet Take 1 tablet (50 mg total) by mouth 3 (three) times a day as needed  for moderate pain or severe pain 90 tablet 2     No current facility-administered medications for this visit.     Allergies   Allergen Reactions    Atorvastatin       Immunizations:     Immunization History   Administered Date(s) Administered    COVID-19 PFIZER VACCINE 0.3 ML IM 03/16/2021, 04/07/2021    INFLUENZA 10/19/2014, 11/14/2015, 10/18/2017, 10/02/2018, 12/07/2019    Influenza, injectable, quadrivalent, preservative free 0.5 mL 12/07/2019    Influenza, seasonal, injectable 12/11/2012, 09/30/2023    Pneumococcal 20-valent Conjugate (Historical) 12/29/2022    Pneumococcal Polysaccharide PPV23 12/05/2006    Td (adult), adsorbed 09/26/1994    Tdap 03/26/2023      Health Maintenance:         Topic Date Due    Hepatitis C Screening  Never done    Colorectal Cancer Screening  07/15/2024         Topic Date Due    Pneumococcal Vaccine: 65+ Years (2 - PCV) 12/29/2023      Medicare Screening Tests and Risk Assessments:     Last Medicare Wellness visit information reviewed, patient interviewed, no change since last AWV.     Health Risk Assessment:   Patient rates overall health as fair. Patient feels that their physical health rating is slightly worse. Patient is satisfied with their life. Eyesight was rated as same. Hearing was rated as same. Patient feels that their emotional and mental health rating is same. Patients states they are never, rarely angry. Patient states they are sometimes unusually tired/fatigued. Pain experienced in the last 7 days has been a lot. Patient's pain rating has been 7/10. Patient states that he has experienced no weight loss or gain in last 6 months.     Fall Risk Screening:   In the past year, patient has experienced: no history of falling in past year      Home Safety:  Patient does not have trouble with stairs inside or outside of their home. Patient has working smoke alarms and has working carbon monoxide detector. Home safety hazards include: none.     Nutrition:   Current diet is  Regular.     Medications:   Patient is not currently taking any over-the-counter supplements. Patient is able to manage medications.     Activities of Daily Living (ADLs)/Instrumental Activities of Daily Living (IADLs):   Walk and transfer into and out of bed and chair?: Yes  Dress and groom yourself?: Yes    Bathe or shower yourself?: Yes    Feed yourself? Yes  Do your laundry/housekeeping?: Yes  Manage your money, pay your bills and track your expenses?: Yes  Make your own meals?: Yes    Do your own shopping?: Yes    Previous Hospitalizations:   Any hospitalizations or ED visits within the last 12 months?: No      Advance Care Planning:   Living will: No    Durable POA for healthcare: No    Advanced directive: No    Advanced directive counseling given: Yes    ACP document given: Yes    Patient declined ACP directive: No    End of Life Decisions reviewed with patient: Yes    Provider agrees with end of life decisions: Yes      Cognitive Screening:   Provider or family/friend/caregiver concerned regarding cognition?: No    PREVENTIVE SCREENINGS      Cardiovascular Screening:    General: Screening Not Indicated and History Lipid Disorder      Diabetes Screening:     General: Screening Current      Colorectal Cancer Screening:     General: Screening Current      Prostate Cancer Screening:    General: History Prostate Cancer      Osteoporosis Screening:    General: Screening Not Indicated      Abdominal Aortic Aneurysm (AAA) Screening:    Risk factors include: age between 65-76 yo and tobacco use        General: Screening Current      Lung Cancer Screening:     General: Screening Current      Hepatitis C Screening:    General: Risks and Benefits Discussed    Hep C Screening Accepted: Yes      Screening, Brief Intervention, and Referral to Treatment (SBIRT)    Screening  Typical number of drinks in a day: 0  Typical number of drinks in a week: 0  Interpretation: Low risk drinking behavior.    AUDIT-C Screenin) How  "often did you have a drink containing alcohol in the past year? never  2) How many drinks did you have on a typical day when you were drinking in the past year? 0  3) How often did you have 6 or more drinks on one occasion in the past year? never    AUDIT-C Score: 0  Interpretation: Score 0-3 (male): Negative screen for alcohol misuse    Single Item Drug Screening:  How often have you used an illegal drug (including marijuana) or a prescription medication for non-medical reasons in the past year? never    Single Item Drug Screen Score: 0  Interpretation: Negative screen for possible drug use disorder    Review of Current Opioid Use    Opioid Risk Tool (ORT) Interpretation: Complete Opioid Risk Tool (ORT)    Other Counseling Topics:   Car/seat belt/driving safety and regular weightbearing exercise.     No results found.     Physical Exam:     /70 (BP Location: Left arm, Patient Position: Sitting, Cuff Size: Standard)   Pulse 85   Temp (!) 95.1 °F (35.1 °C) (Tympanic)   Ht 5' 8\" (1.727 m)   Wt 54.4 kg (120 lb)   SpO2 97%   BMI 18.25 kg/m²     Physical Exam  Vitals and nursing note reviewed.   Constitutional:       General: He is not in acute distress.     Appearance: He is well-developed.   HENT:      Head: Normocephalic and atraumatic.   Eyes:      Conjunctiva/sclera: Conjunctivae normal.   Cardiovascular:      Rate and Rhythm: Normal rate and regular rhythm.      Heart sounds: No murmur heard.  Pulmonary:      Effort: Pulmonary effort is normal. No respiratory distress.      Breath sounds: Normal breath sounds.   Abdominal:      General: Abdomen is flat.      Palpations: Abdomen is soft.      Tenderness: There is abdominal tenderness.   Musculoskeletal:         General: Tenderness present. No swelling. Normal range of motion.      Cervical back: Neck supple.      Comments: Tenderness over the paravertebral lumbar musculature, diminished range of motion.   Skin:     General: Skin is warm and dry.      " Capillary Refill: Capillary refill takes less than 2 seconds.   Neurological:      Mental Status: He is alert.   Psychiatric:         Mood and Affect: Mood normal.          Jose Ansari, DO

## 2024-01-18 NOTE — ASSESSMENT & PLAN NOTE
Discussed need for living will and DURABLE POWER OF .  He actually has paperwork done just needs to get to a  to notarized.  Discussed tobacco cessation which she declines.  Reviewed screening studies with him, and will review again at July when he will be due for his low-dose CAT scan.

## 2024-02-21 ENCOUNTER — OFFICE VISIT (OUTPATIENT)
Age: 69
End: 2024-02-21
Payer: MEDICARE

## 2024-02-21 VITALS
HEART RATE: 102 BPM | HEIGHT: 68 IN | SYSTOLIC BLOOD PRESSURE: 105 MMHG | DIASTOLIC BLOOD PRESSURE: 71 MMHG | BODY MASS INDEX: 18.58 KG/M2 | WEIGHT: 122.6 LBS

## 2024-02-21 DIAGNOSIS — M47.816 LUMBAR SPONDYLOSIS: ICD-10-CM

## 2024-02-21 DIAGNOSIS — Z79.891 LONG-TERM CURRENT USE OF OPIATE ANALGESIC: ICD-10-CM

## 2024-02-21 DIAGNOSIS — M51.16 LUMBAR DISC DISEASE WITH RADICULOPATHY: ICD-10-CM

## 2024-02-21 DIAGNOSIS — F11.20 UNCOMPLICATED OPIOID DEPENDENCE (HCC): ICD-10-CM

## 2024-02-21 DIAGNOSIS — G89.4 CHRONIC PAIN DISORDER: Primary | ICD-10-CM

## 2024-02-21 PROBLEM — Z00.00 MEDICARE ANNUAL WELLNESS VISIT, SUBSEQUENT: Status: RESOLVED | Noted: 2024-01-18 | Resolved: 2024-02-21

## 2024-02-21 PROCEDURE — 99214 OFFICE O/P EST MOD 30 MIN: CPT | Performed by: NURSE PRACTITIONER

## 2024-02-21 RX ORDER — TRAMADOL HYDROCHLORIDE 50 MG/1
50 TABLET ORAL 3 TIMES DAILY PRN
Qty: 90 TABLET | Refills: 2 | Status: SHIPPED | OUTPATIENT
Start: 2024-03-17

## 2024-02-21 RX ORDER — PHENOL 1.4 %
AEROSOL, SPRAY (ML) MUCOUS MEMBRANE
COMMUNITY

## 2024-02-21 RX ORDER — GABAPENTIN 600 MG/1
600 TABLET ORAL 4 TIMES DAILY
Qty: 360 TABLET | Refills: 1 | Status: SHIPPED | OUTPATIENT
Start: 2024-02-21 | End: 2024-03-22

## 2024-02-21 NOTE — PROGRESS NOTES
Assessment:  1. Chronic pain disorder    2. Lumbar spondylosis    3. Lumbar disc disease with radiculopathy        Plan:  While the patient was in the office today, I did have a thorough conversation regarding their chronic pain syndrome, medication management, and treatment plan options.  Patient is being seen for a follow-up visit.  Overall, he reports that his pain is reasonably controlled with his current medication regimen which reduces his pain by about 50%.  He denies side effects from medications.    Continue tramadol 50 mg 3 times daily if needed for pain.  A prescription was sent to his pharmacy with a do not fill date of 3/17/2024 with 2 refills.    Continue gabapentin 600 mg 4 times daily.  A 90-day supply of this medication was sent to his pharmacy with 1 refill.    Continue Robaxin 500 mg twice daily if needed for spasms.  He did not require refill of this medication during today's visit.    [place] Prescription Drug Monitoring Program report was reviewed and was appropriate     A urine drug screen was collected at today's office visit as part of our medication management protocol. The point of care testing results were appropriate for what was being prescribed. The specimen will be sent for confirmatory testing. The drug screen is medically necessary because the patient is either dependent on opioid medication or is being considered for opioid medication therapy and the results could impact ongoing or future treatment. The drug screen is to evaluate for the presences or absence of prescribed, non-prescribed, and/or illicit drugs/substances.    There are risks associated with opioid medications, including dependence, addiction and tolerance. The patient understands and agrees to use these medications only as prescribed. Potential side effects of the medications include, but are not limited to, constipation, drowsiness, addiction, impaired judgment and risk of fatal overdose if not taken as  prescribed. The patient was warned against driving while taking sedation medications.  Sharing medications is a felony. At this point in time, the patient is showing no signs of addiction, abuse, diversion or suicidal ideation.    The patient will follow-up in 12 weeks for medication prescription refill and reevaluation. The patient was advised to contact the office should their symptoms worsen in the interim. The patient was agreeable and verbalized an understanding.        History of Present Illness:    The patient is a 68 y.o. male last seen on 1/24/2023 who presents for a follow up office visit in regards to chronic pain secondary to chronic pain syndrome, lumbar degenerative disc disease with lumbar radiculopathy, lumbar spondylosis.  The patient currently reports complaints of low back pain that radiates down the entire right leg and down the anterior left thigh.  Current pain level is a 7/10.  Quality pain is described as burning, dull, aching, numb, pins-and-needles.    Current pain medications includes: Tramadol 50 mg 3 times daily if needed for pain, gabapentin 600 mg 4 times daily, Robaxin 500 mg twice daily if needed for spasms .  The patient reports that this regimen is providing 50% pain relief.  The patient is reporting no side effects from this pain medication regimen.    Pain Contract Signed: 5/25/23  Last Urine Drug Screen: 2/21/24    I have personally reviewed and/or updated the patient's past medical history, past surgical history, family history, social history, current medications, allergies, and vital signs today.       Review of Systems:    Review of Systems   Constitutional:  Negative for unexpected weight change.   HENT:  Negative for hearing loss.    Eyes:  Negative for visual disturbance.   Respiratory:  Negative for shortness of breath.    Cardiovascular:  Negative for leg swelling.   Gastrointestinal:  Negative for constipation.   Endocrine: Negative for polyuria.   Genitourinary:   Negative for difficulty urinating.   Musculoskeletal:  Positive for arthralgias, gait problem and myalgias. Negative for joint swelling.        Pain in extremity-rt leg   Skin:  Negative for rash.   Neurological:  Negative for weakness and headaches.   Psychiatric/Behavioral:  Negative for decreased concentration.    All other systems reviewed and are negative.        Past Medical History:   Diagnosis Date    BCC (basal cell carcinoma)     in past left side of nose    Cancer (HCC) 1/2019    Chronic pain disorder     low back and down rle    Coronary artery disease     cabg x 1 2006    Hyperlipidemia     Malignant neoplasm prostate (HCC)        Past Surgical History:   Procedure Laterality Date    COLONOSCOPY      CORONARY ARTERY BYPASS GRAFT  07/10/2006    LIMA to the diagonal artery    INGUINAL HERNIA REPAIR Right 1996    LAMINECTOMY  1/2020    AR LAPS SURG [id number] RPBIC RAD W/NRV SPARING ROBOT N/A 04/22/2019    Procedure: PROSTATECTOMY RADICAL W/ROBOTICS, B/L PELVIC NODE DISSECT;  Surgeon: Taran Adam MD;  Location: [facility] OR;  Service: Urology    SKIN CANCER EXCISION  2016    left side nose    SPINE SURGERY  1/2020    THORACIC SPINE SURGERY      T3-T4, removal of an arachnoid cyst.    US GUIDED PROSTATE BIOPSY  02/28/2019       Family History   Problem Relation Age of Onset    Cancer Father         bladder    Lung cancer Father         metastatic       Social History     Occupational History    Not on file   Tobacco Use    Smoking status: Every Day     Current packs/day: 0.50     Average packs/day: 0.8 packs/day for 30.1 years (25.6 ttl pk-yrs)     Types: Cigarettes     Start date: 1994    Smokeless tobacco: Never   Vaping Use    Vaping status: Never Used   Substance and Sexual Activity    Alcohol use: Not Currently    Drug use: Never    Sexual activity: Not Currently     Partners: Female     Birth control/protection: None         Current Outpatient Medications:     acetaminophen (TYLENOL) 500 mg tablet,  "Take 500 mg by mouth Three times a day, Disp: , Rfl:     aspirin (ECOTRIN LOW STRENGTH) 81 mg EC tablet, Take 81 mg by mouth daily, Disp: , Rfl:     DULoxetine (CYMBALTA) 60 mg delayed release capsule, Take 1 capsule (60 mg total) by mouth daily, Disp: 90 capsule, Rfl: 3    gabapentin (NEURONTIN) 600 MG tablet, Take 1 tablet (600 mg total) by mouth 4 (four) times a day, Disp: 360 tablet, Rfl: 1    ibuprofen (MOTRIN) 200 mg tablet, Take by mouth every 6 (six) hours as needed for mild pain, Disp: , Rfl:     Melatonin 10 MG TABS, Take by mouth, Disp: , Rfl:     methocarbamol (ROBAXIN) 500 mg tablet, TAKE 1 TABLET TWICE DAILY, Disp: 180 tablet, Rfl: 3    rosuvastatin (CRESTOR) 20 MG tablet, Take 1 tablet (20 mg total) by mouth daily, Disp: 90 tablet, Rfl: 5    [START ON 3/17/2024] traMADol (ULTRAM) 50 mg tablet, Take 1 tablet (50 mg total) by mouth 3 (three) times a day as needed for moderate pain or severe pain Do not start before March 17, 2024., Disp: 90 tablet, Rfl: 2    Allergies   Allergen Reactions    Atorvastatin        Physical Exam:    /71   Pulse 102   Ht 5' 8\" (1.727 m)   Wt 55.6 kg (122 lb 9.6 oz)   BMI 18.64 kg/m²     Constitutional:normal, well developed, well nourished, alert, in no distress and non-toxic and no overt pain behavior.  Eyes:anicteric  HEENT:grossly intact  Neck:supple, symmetric, trachea midline and no masses   Pulmonary:even and unlabored  Cardiovascular:No edema or pitting edema present  Skin:Normal without rashes or lesions and well hydrated  Psychiatric:Mood and affect appropriate  Neurologic:Cranial Nerves II-XII grossly intact  Musculoskeletal:normal      Imaging  No orders to display         No orders of the defined types were placed in this encounter.      "

## 2024-02-21 NOTE — PATIENT INSTRUCTIONS
Opioid Safety   WHAT YOU NEED TO KNOW:   An opioid medicine is used to treat pain. Examples are oxycodone, morphine, fentanyl, or codeine. Pain control and management may help you rest, heal, and return to your daily activities. You and your family will receive information about how to manage your pain at home. The instructions will include what to do if you have side effects as your pain is managed. You will get information on how to handle opioid medicine safely. You will also get suggestions on how to control pain without opioids. It is important to follow all instructions so your pain is managed effectively.  DISCHARGE INSTRUCTIONS:   Call your local emergency number (911 in the ), or have someone else call if:   You have a seizure.    You cannot be woken.    You have trouble staying awake and your breathing is slow or shallow.    Your speech is slurred, or you are confused.    You are dizzy or stumble when you walk.    Call your doctor, or have someone close to you call if:   You are extremely drowsy, or you have trouble staying awake or speaking.    You have pale or clammy skin.    You have blue fingernails or lips.    Your heartbeat is slower than normal.    You cannot stop vomiting.    You have questions or concerns about your condition or care.    Use opioids safely:   Take prescribed opioids exactly as directed.  Opioids come with directions based on the kind and how it is given. Talk to your healthcare provider or a pharmacist if you have any questions. Do not take more than the recommended amount. Too much can cause a life-threatening overdose. Do not continue to take it after your pain stops. You may develop tolerance. This means you keep needing higher doses to get the same effect. You may also develop opioid use disorder. This means you are not able to control your opioid use.    Do not give opioids to others or take opioids that belong to someone else.  The kind or amount one person takes may not  be right for another. The person you share them with may also be taking medicines that do not mix with opioids. The person may drink alcohol or use other drugs that can cause life-threatening problems when mixed with opioids.    Do not mix opioids with other medicines or alcohol.  The combination can cause an overdose, or cause you to stop breathing. Alcohol, sleeping pills, and medicines such as antihistamines can make you sleepy. A combination with opioids can lead to a coma.    Do not drive or operate heavy machinery after you use an opioid.  You may feel drowsy or have trouble concentrating. You can injure yourself or others if you drive or use heavy machinery when you are not alert. Your provider or pharmacist can tell you how long to wait after a dose before you do these activities.    Talk to your healthcare provider if you have any side effects.  Side effects include nausea, sleepiness, itching, and trouble thinking clearly. Your provider may need to make changes to the kind or amount of opioid you are taking. Your provider can also help you find ways to prevent or relieve side effects.    Manage constipation:  Constipation is the most common side effect of opioid medicine. Constipation is when you have hard, dry bowel movements, or you go longer than usual between bowel movements. Tell your healthcare provider about all changes in your bowel movements while you are taking opioids. Your provider may recommend laxative medicine to help you have a bowel movement. Your provider may also change the kind of opioid you are taking, or change when you take it. The following are more ways you can prevent or relieve constipation:  Drink liquids as directed.  You may need to drink extra liquids to help soften and move your bowels. Ask how much liquid to drink each day and which liquids are best for you.    Eat high-fiber foods.  This may help decrease constipation by adding bulk to your bowel movements. High-fiber  foods include fruits, vegetables, whole-grain breads and cereals, and beans. Your healthcare provider or dietitian can help you create a high-fiber meal plan. Your provider may also recommend a fiber supplement if you cannot get enough fiber from food.         Exercise regularly.  Regular physical activity can help stimulate your intestines. Walking is a good exercise to prevent or relieve constipation. Ask which exercises are best for you.         Schedule a time each day to have a bowel movement.  This may help train your body to have regular bowel movements. Bend forward while you are on the toilet to help move the bowel movement out. Sit on the toilet for at least 10 minutes, even if you do not have a bowel movement.    Store opioids safely:   Store opioids where others cannot easily get them.  Keep them in a locked cabinet or secure area. Do not  keep them in a purse or other bag you carry with you. A person may be looking for something else and find the opioids.         Make sure opioids are stored out of the reach of children.  A child can easily overdose on opioids. Opioids may look like candy to a small child.    The best way to dispose of opioids:  The laws vary by country and area. In the [place], the best way is to return the opioids through a take-back program. This program is offered by the [place] Drug Enforcement Agency (WANG). The following are options for using the program:  Take the opioids to a WANG collection site.  The site is often a law enforcement center. Call your local law enforcement center for scheduled take-back days in your area. You will be given information on where to go if the collection site is in a different location.    Take the opioids to an approved pharmacy or hospital.  A pharmacy or hospital may be set up as a collection site. You will need to ask if it is a WANG collection site if you were not directed there. A pharmacy or doctor's office may not be able to take back opioids  unless it is a WANG site.    Use a mail-back system.  This means you are given containers to put the opioids into. You will then mail them in the containers.    Use a take-back drop box.  This is a place to leave the opioids at any time. People and animals will not be able to get into the box. Your local law enforcement agency can tell you where to find a drop box in your area.    Other safe ways to dispose of opioids:  The medicine may come with disposal instructions. The instructions may vary depending on the brand of medicine you are using. Instructions may come in a Medication Guide, but not every medicine has one. You may instead get instructions from your pharmacy or doctor. Follow instructions carefully. The following are general guidelines to follow:  Find out if you can flush the opioid.  Some opioids can be flushed down the toilet or poured into the sink. You will need to contact authorities in your area to see if this is an option for you. The FDA also offers a list of medicines that are safe to flush down the toilet. You can check the list if you cannot get the information for your local area.    Ask your waste management company about rules for putting opioids in the trash.  The company will be able to give you specific directions. Scratch out personal information on the original medicine label so it cannot be read. Then put it in the trash. Do not label the trash or put any information on it about the opioids. It should look like regular household trash so no one is tempted to look for the opioids. Keep the trash out of the reach of children and animals. Always make sure trash is secure.    Talk to officials if you live in a facility.  If you live in a nursing home or assisted living center, talk to an official. The person will know the rules for your area.    Other ways to manage pain:   Ask your healthcare provider about non-opioid medicines to control pain.  Some medicines may even work better than  opioids, depending on the cause of your pain. Nonprescription medicines include NSAIDs (such as ibuprofen) and acetaminophen. Prescription medicines include muscle relaxers, antidepressants, and steroids.    Pain may be managed without any medicines.  Some ways to relieve pain include massage, aromatherapy, or meditation. Physical or occupational therapy may also help.    Follow up with your doctor or pain specialist as directed:  You may need to have your dose adjusted. Your doctor or pain specialist can also help you find ways to manage pain without opioids. Write down your questions so you remember to ask them during your visits.  For more information:   Drug Enforcement Administration  [address] [place] [address]  Phone: 6- 163 - 530-7669  Web Address: https://www.deadiversion.[facility].gov/drug_disposal/    US Food and Drug Administration  [address] [place] [address]  Phone: 0- 277 - 287-4348  Web Address: http://www.fda.gov  © Copyright [employer] 2023 Information is for End User's use only and may not be sold, redistributed or otherwise used for commercial purposes.  The above information is an  only. It is not intended as medical advice for individual conditions or treatments. Talk to your doctor, nurse or pharmacist before following any medical regimen to see if it is safe and effective for you.

## 2024-06-12 ENCOUNTER — OFFICE VISIT (OUTPATIENT)
Age: 69
End: 2024-06-12
Payer: MEDICARE

## 2024-06-12 VITALS
BODY MASS INDEX: 17.88 KG/M2 | WEIGHT: 118 LBS | SYSTOLIC BLOOD PRESSURE: 105 MMHG | HEART RATE: 65 BPM | HEIGHT: 68 IN | DIASTOLIC BLOOD PRESSURE: 67 MMHG

## 2024-06-12 DIAGNOSIS — M51.16 LUMBAR DISC DISEASE WITH RADICULOPATHY: ICD-10-CM

## 2024-06-12 DIAGNOSIS — M47.816 LUMBAR SPONDYLOSIS: ICD-10-CM

## 2024-06-12 DIAGNOSIS — G89.4 CHRONIC PAIN DISORDER: Primary | ICD-10-CM

## 2024-06-12 PROCEDURE — 99214 OFFICE O/P EST MOD 30 MIN: CPT | Performed by: NURSE PRACTITIONER

## 2024-06-12 PROCEDURE — G2211 COMPLEX E/M VISIT ADD ON: HCPCS | Performed by: NURSE PRACTITIONER

## 2024-06-12 RX ORDER — TRAMADOL HYDROCHLORIDE 50 MG/1
50 TABLET ORAL 3 TIMES DAILY PRN
Qty: 90 TABLET | Refills: 2 | Status: SHIPPED | OUTPATIENT
Start: 2024-06-27

## 2024-06-12 NOTE — PROGRESS NOTES
Assessment:  1. Chronic pain disorder    2. Lumbar spondylosis    3. Lumbar disc disease with radiculopathy        Plan:  While the patient was in the office today, I did have a thorough conversation regarding their chronic pain syndrome, medication management, and treatment plan options.  Patient is being seen for a follow-up visit.  He continues with low back pain and intermittent leg pain.  Patient has been experiencing increased low back pain lately.  Patient was previously treated at [facility] about 2 years ago with lumbar facet radiofrequency ablations which reduced his low back pain by about 50% until recently.  Today, we discussed possibility of repeating medial branch blocks to determine if he is a good candidate for radiofrequency ablations.  Patient would like to hold off for now.  We will discuss again at his next office visit.    In the meantime, continue with tramadol 50 mg 3 times daily if needed for pain.  A prescription was sent to his pharmacy with 2 refills.    Continue gabapentin 100 mg 4 times daily.  He did not require refill of this medication during today's visit.    Continue Robaxin 500 mg twice daily if needed for spasms.  He did not require refill of this medication during today's visit.    [place] Prescription Drug Monitoring Program report was reviewed and was appropriate     There are risks associated with opioid medications, including dependence, addiction and tolerance. The patient understands and agrees to use these medications only as prescribed. Potential side effects of the medications include, but are not limited to, constipation, drowsiness, addiction, impaired judgment and risk of fatal overdose if not taken as prescribed. The patient was warned against driving while taking sedation medications.  Sharing medications is a felony. At this point in time, the patient is showing no signs of addiction, abuse, diversion or suicidal ideation.    While the patient  was in the office today an opioid contract was thoroughly reviewed and signed by the patient. The patient was given adequate time to ask questions in regards to the contract and a signed copy was sent home for his/her records.    The patient will follow-up in 12 weeks for medication prescription refill and reevaluation. The patient was advised to contact the office should their symptoms worsen in the interim. The patient was agreeable and verbalized an understanding.        History of Present Illness:    The patient is a 68 y.o. male last seen on 2/21/2024 who presents for a follow up office visit in regards to chronic pain secondary to phonic back pain, lumbar spondylosis.  The patient currently reports complaints of low back pain and intermittent bilateral leg pain.  Current pain level is a 7/10.  Quality pain is described as burning, dull, numb, pins-and-needles.    Current pain medications includes: Tramadol 50 mg 3 times daily if needed for pain, gabapentin 600 mg 4 times daily, Robaxin 500 mg twice daily if needed for spasms .  The patient reports that this regimen is providing about 50% pain relief.  The patient is reporting no side effects from this pain medication regimen.    Pain Contract Signed: 6/12/2024  Last Urine Drug Screen: 2/21/24    I have personally reviewed and/or updated the patient's past medical history, past surgical history, family history, social history, current medications, allergies, and vital signs today.       Review of Systems:    Review of Systems   Constitutional:  Negative for unexpected weight change.   HENT:  Negative for hearing loss.    Eyes:  Negative for visual disturbance.   Respiratory:  Negative for shortness of breath.    Cardiovascular:  Negative for leg swelling.   Gastrointestinal:  Negative for constipation.   Endocrine: Negative for polyuria.   Genitourinary:  Negative for difficulty urinating.   Musculoskeletal:  Positive for gait problem and myalgias. Negative for  joint swelling.        Pain in extremity-right leg   Skin:  Negative for rash.   Neurological:  Negative for weakness and headaches.   Psychiatric/Behavioral:  Negative for decreased concentration.    All other systems reviewed and are negative.        Past Medical History:   Diagnosis Date    BCC (basal cell carcinoma)     in past left side of nose    Cancer (HCC) 1/2019    Chronic pain disorder     low back and down rle    Coronary artery disease     cabg x 1 2006    Hyperlipidemia     Malignant neoplasm prostate (HCC)        Past Surgical History:   Procedure Laterality Date    COLONOSCOPY      CORONARY ARTERY BYPASS GRAFT  07/10/2006    LIMA to the diagonal artery    INGUINAL HERNIA REPAIR Right 1996    LAMINECTOMY  1/2020    DE LAPS SURG [id number] RPBIC RAD W/NRV SPARING ROBOT N/A 04/22/2019    Procedure: PROSTATECTOMY RADICAL W/ROBOTICS, B/L PELVIC NODE DISSECT;  Surgeon: Taran Adam MD;  Location: [facility];  Service: Urology    SKIN CANCER EXCISION  2016    left side nose    SPINE SURGERY  1/2020    THORACIC SPINE SURGERY      T3-T4, removal of an arachnoid cyst.    US GUIDED PROSTATE BIOPSY  02/28/2019       Family History   Problem Relation Age of Onset    Cancer Father         bladder    Lung cancer Father         metastatic       Social History     Occupational History    Not on file   Tobacco Use    Smoking status: Every Day     Current packs/day: 0.50     Average packs/day: 0.8 packs/day for 30.4 years (25.7 ttl pk-yrs)     Types: Cigarettes     Start date: 1994    Smokeless tobacco: Never   Vaping Use    Vaping status: Never Used   Substance and Sexual Activity    Alcohol use: Not Currently    Drug use: Never    Sexual activity: Not Currently     Partners: Female     Birth control/protection: None         Current Outpatient Medications:     acetaminophen (TYLENOL) 500 mg tablet, Take 500 mg by mouth Three times a day, Disp: , Rfl:     aspirin (ECOTRIN LOW STRENGTH) 81 mg EC tablet, Take 81 mg by  "mouth daily, Disp: , Rfl:     DULoxetine (CYMBALTA) 60 mg delayed release capsule, Take 1 capsule (60 mg total) by mouth daily, Disp: 90 capsule, Rfl: 3    ibuprofen (MOTRIN) 200 mg tablet, Take by mouth every 6 (six) hours as needed for mild pain, Disp: , Rfl:     Melatonin 10 MG TABS, Take by mouth, Disp: , Rfl:     methocarbamol (ROBAXIN) 500 mg tablet, TAKE 1 TABLET TWICE DAILY, Disp: 180 tablet, Rfl: 3    rosuvastatin (CRESTOR) 20 MG tablet, Take 1 tablet (20 mg total) by mouth daily, Disp: 90 tablet, Rfl: 5    [START ON 6/27/2024] traMADol (ULTRAM) 50 mg tablet, Take 1 tablet (50 mg total) by mouth 3 (three) times a day as needed for moderate pain or severe pain Do not start before June 27, 2024., Disp: 90 tablet, Rfl: 2    gabapentin (NEURONTIN) 600 MG tablet, Take 1 tablet (600 mg total) by mouth 4 (four) times a day, Disp: 360 tablet, Rfl: 1    Allergies   Allergen Reactions    Atorvastatin        Physical Exam:    /67   Pulse 65   Ht 5' 8\" (1.727 m)   Wt 53.5 kg (118 lb)   BMI 17.94 kg/m²     Constitutional:normal, well developed, well nourished, alert, in no distress and non-toxic and no overt pain behavior.  Eyes:anicteric  HEENT:grossly intact  Neck:supple, symmetric, trachea midline and no masses   Pulmonary:even and unlabored  Cardiovascular:No edema or pitting edema present  Skin:Normal without rashes or lesions and well hydrated  Psychiatric:Mood and affect appropriate  Neurologic:Cranial Nerves II-XII grossly intact  Musculoskeletal:normal      Imaging  No orders to display         No orders of the defined types were placed in this encounter.      "

## 2024-06-12 NOTE — PATIENT INSTRUCTIONS
Opioid Safety   WHAT YOU NEED TO KNOW:   An opioid medicine is used to treat pain. Examples are oxycodone, morphine, fentanyl, or codeine. Pain control and management may help you rest, heal, and return to your daily activities. You and your family will receive information about how to manage your pain at home. The instructions will include what to do if you have side effects as your pain is managed. You will get information on how to handle opioid medicine safely. You will also get suggestions on how to control pain without opioids. It is important to follow all instructions so your pain is managed effectively.  DISCHARGE INSTRUCTIONS:   Call your local emergency number (911 in the ), or have someone else call if:   You have a seizure.    You cannot be woken.    You have trouble staying awake and your breathing is slow or shallow.    Your speech is slurred, or you are confused.    You are dizzy or stumble when you walk.    Call your doctor, or have someone close to you call if:   You are extremely drowsy, or you have trouble staying awake or speaking.    You have pale or clammy skin.    You have blue fingernails or lips.    Your heartbeat is slower than normal.    You cannot stop vomiting.    You have questions or concerns about your condition or care.    Use opioids safely:   Take prescribed opioids exactly as directed.  Opioids come with directions based on the kind and how it is given. Talk to your healthcare provider or a pharmacist if you have any questions. Do not take more than the recommended amount. Too much can cause a life-threatening overdose. Do not continue to take it after your pain stops. You may develop tolerance. This means you keep needing higher doses to get the same effect. You may also develop opioid use disorder. This means you are not able to control your opioid use.    Do not give opioids to others or take opioids that belong to someone else.  The kind or amount one person takes may not  be right for another. The person you share them with may also be taking medicines that do not mix with opioids. The person may drink alcohol or use other drugs that can cause life-threatening problems when mixed with opioids.    Do not mix opioids with other medicines or alcohol.  The combination can cause an overdose, or cause you to stop breathing. Alcohol, sleeping pills, and medicines such as antihistamines can make you sleepy. A combination with opioids can lead to a coma.    Do not drive or operate heavy machinery after you use an opioid.  You may feel drowsy or have trouble concentrating. You can injure yourself or others if you drive or use heavy machinery when you are not alert. Your provider or pharmacist can tell you how long to wait after a dose before you do these activities.    Talk to your healthcare provider if you have any side effects.  Side effects include nausea, sleepiness, itching, and trouble thinking clearly. Your provider may need to make changes to the kind or amount of opioid you are taking. Your provider can also help you find ways to prevent or relieve side effects.    Manage constipation:  Constipation is the most common side effect of opioid medicine. Constipation is when you have hard, dry bowel movements, or you go longer than usual between bowel movements. Tell your healthcare provider about all changes in your bowel movements while you are taking opioids. Your provider may recommend laxative medicine to help you have a bowel movement. Your provider may also change the kind of opioid you are taking, or change when you take it. The following are more ways you can prevent or relieve constipation:  Drink liquids as directed.  You may need to drink extra liquids to help soften and move your bowels. Ask how much liquid to drink each day and which liquids are best for you.    Eat high-fiber foods.  This may help decrease constipation by adding bulk to your bowel movements. High-fiber  foods include fruits, vegetables, whole-grain breads and cereals, and beans. Your healthcare provider or dietitian can help you create a high-fiber meal plan. Your provider may also recommend a fiber supplement if you cannot get enough fiber from food.         Exercise regularly.  Regular physical activity can help stimulate your intestines. Walking is a good exercise to prevent or relieve constipation. Ask which exercises are best for you.         Schedule a time each day to have a bowel movement.  This may help train your body to have regular bowel movements. Bend forward while you are on the toilet to help move the bowel movement out. Sit on the toilet for at least 10 minutes, even if you do not have a bowel movement.    Store opioids safely:   Store opioids where others cannot easily get them.  Keep them in a locked cabinet or secure area. Do not  keep them in a purse or other bag you carry with you. A person may be looking for something else and find the opioids.         Make sure opioids are stored out of the reach of children.  A child can easily overdose on opioids. Opioids may look like candy to a small child.    The best way to dispose of opioids:  The laws vary by country and area. In the [place], the best way is to return the opioids through a take-back program. This program is offered by the [place] Drug Enforcement Agency (WANG). The following are options for using the program:  Take the opioids to a WANG collection site.  The site is often a law enforcement center. Call your local law enforcement center for scheduled take-back days in your area. You will be given information on where to go if the collection site is in a different location.    Take the opioids to an approved pharmacy or hospital.  A pharmacy or hospital may be set up as a collection site. You will need to ask if it is a WANG collection site if you were not directed there. A pharmacy or doctor's office may not be able to take back opioids  unless it is a WANG site.    Use a mail-back system.  This means you are given containers to put the opioids into. You will then mail them in the containers.    Use a take-back drop box.  This is a place to leave the opioids at any time. People and animals will not be able to get into the box. Your local law enforcement agency can tell you where to find a drop box in your area.    Other safe ways to dispose of opioids:  The medicine may come with disposal instructions. The instructions may vary depending on the brand of medicine you are using. Instructions may come in a Medication Guide, but not every medicine has one. You may instead get instructions from your pharmacy or doctor. Follow instructions carefully. The following are general guidelines to follow:  Find out if you can flush the opioid.  Some opioids can be flushed down the toilet or poured into the sink. You will need to contact authorities in your area to see if this is an option for you. The FDA also offers a list of medicines that are safe to flush down the toilet. You can check the list if you cannot get the information for your local area.    Ask your waste management company about rules for putting opioids in the trash.  The company will be able to give you specific directions. Scratch out personal information on the original medicine label so it cannot be read. Then put it in the trash. Do not label the trash or put any information on it about the opioids. It should look like regular household trash so no one is tempted to look for the opioids. Keep the trash out of the reach of children and animals. Always make sure trash is secure.    Talk to officials if you live in a facility.  If you live in a nursing home or assisted living center, talk to an official. The person will know the rules for your area.    Other ways to manage pain:   Ask your healthcare provider about non-opioid medicines to control pain.  Some medicines may even work better than  opioids, depending on the cause of your pain. Nonprescription medicines include NSAIDs (such as ibuprofen) and acetaminophen. Prescription medicines include muscle relaxers, antidepressants, and steroids.    Pain may be managed without any medicines.  Some ways to relieve pain include massage, aromatherapy, or meditation. Physical or occupational therapy may also help.    Follow up with your doctor or pain specialist as directed:  You may need to have your dose adjusted. Your doctor or pain specialist can also help you find ways to manage pain without opioids. Write down your questions so you remember to ask them during your visits.  For more information:   Drug Enforcement Administration  [address] [place] [address]  Phone: 4- 612 - 026-8327  Web Address: https://www.deadiversion.[facility].gov/drug_disposal/    [place] Food and Drug Administration  [address] [place] [address]  Phone: 4- 546 - 850-2912  Web Address: http://www.fda.gov  © Copyright [employer] 2023 Information is for End User's use only and may not be sold, redistributed or otherwise used for commercial purposes.  The above information is an  only. It is not intended as medical advice for individual conditions or treatments. Talk to your doctor, nurse or pharmacist before following any medical regimen to see if it is safe and effective for you.

## 2024-07-16 ENCOUNTER — APPOINTMENT (OUTPATIENT)
Dept: LAB | Facility: CLINIC | Age: 69
End: 2024-07-16
Payer: MEDICARE

## 2024-07-16 DIAGNOSIS — R73.9 ELEVATED BLOOD SUGAR: ICD-10-CM

## 2024-07-16 DIAGNOSIS — G89.4 CHRONIC PAIN SYNDROME: ICD-10-CM

## 2024-07-16 DIAGNOSIS — R73.03 PREDIABETES: ICD-10-CM

## 2024-07-16 DIAGNOSIS — M51.16 LUMBAR DISC DISEASE WITH RADICULOPATHY: ICD-10-CM

## 2024-07-16 DIAGNOSIS — E78.5 DYSLIPIDEMIA: ICD-10-CM

## 2024-07-16 LAB
ALBUMIN SERPL BCG-MCNC: 4.2 G/DL (ref 3.5–5)
ALP SERPL-CCNC: 58 U/L (ref 34–104)
ALT SERPL W P-5'-P-CCNC: 19 U/L (ref 7–52)
ANION GAP SERPL CALCULATED.3IONS-SCNC: 9 MMOL/L (ref 4–13)
AST SERPL W P-5'-P-CCNC: 18 U/L (ref 13–39)
BASOPHILS # BLD AUTO: 0.11 THOUSANDS/ÂΜL (ref 0–0.1)
BASOPHILS NFR BLD AUTO: 1 % (ref 0–1)
BILIRUB SERPL-MCNC: 0.54 MG/DL (ref 0.2–1)
BUN SERPL-MCNC: 14 MG/DL (ref 5–25)
CALCIUM SERPL-MCNC: 9.4 MG/DL (ref 8.4–10.2)
CHLORIDE SERPL-SCNC: 102 MMOL/L (ref 96–108)
CHOLEST SERPL-MCNC: 105 MG/DL
CO2 SERPL-SCNC: 29 MMOL/L (ref 21–32)
CREAT SERPL-MCNC: 0.91 MG/DL (ref 0.6–1.3)
EOSINOPHIL # BLD AUTO: 0.53 THOUSAND/ÂΜL (ref 0–0.61)
EOSINOPHIL NFR BLD AUTO: 5 % (ref 0–6)
ERYTHROCYTE [DISTWIDTH] IN BLOOD BY AUTOMATED COUNT: 13 % (ref 11.6–15.1)
EST. AVERAGE GLUCOSE BLD GHB EST-MCNC: 117 MG/DL
GFR SERPL CREATININE-BSD FRML MDRD: 85 ML/MIN/1.73SQ M
GLUCOSE P FAST SERPL-MCNC: 85 MG/DL (ref 65–99)
HBA1C MFR BLD: 5.7 %
HCT VFR BLD AUTO: 44.5 % (ref 36.5–49.3)
HDLC SERPL-MCNC: 53 MG/DL
HGB BLD-MCNC: 14.8 G/DL (ref 12–17)
IMM GRANULOCYTES # BLD AUTO: 0.04 THOUSAND/UL (ref 0–0.2)
IMM GRANULOCYTES NFR BLD AUTO: 0 % (ref 0–2)
LDLC SERPL CALC-MCNC: 36 MG/DL (ref 0–100)
LYMPHOCYTES # BLD AUTO: 3.28 THOUSANDS/ÂΜL (ref 0.6–4.47)
LYMPHOCYTES NFR BLD AUTO: 31 % (ref 14–44)
MCH RBC QN AUTO: 31.2 PG (ref 26.8–34.3)
MCHC RBC AUTO-ENTMCNC: 33.3 G/DL (ref 31.4–37.4)
MCV RBC AUTO: 94 FL (ref 82–98)
MONOCYTES # BLD AUTO: 0.86 THOUSAND/ÂΜL (ref 0.17–1.22)
MONOCYTES NFR BLD AUTO: 8 % (ref 4–12)
NEUTROPHILS # BLD AUTO: 5.69 THOUSANDS/ÂΜL (ref 1.85–7.62)
NEUTS SEG NFR BLD AUTO: 55 % (ref 43–75)
NRBC BLD AUTO-RTO: 0 /100 WBCS
PLATELET # BLD AUTO: 172 THOUSANDS/UL (ref 149–390)
PMV BLD AUTO: 11.4 FL (ref 8.9–12.7)
POTASSIUM SERPL-SCNC: 4.5 MMOL/L (ref 3.5–5.3)
PROT SERPL-MCNC: 6.4 G/DL (ref 6.4–8.4)
RBC # BLD AUTO: 4.75 MILLION/UL (ref 3.88–5.62)
SODIUM SERPL-SCNC: 140 MMOL/L (ref 135–147)
TRIGL SERPL-MCNC: 80 MG/DL
WBC # BLD AUTO: 10.51 THOUSAND/UL (ref 4.31–10.16)

## 2024-07-16 PROCEDURE — 85025 COMPLETE CBC W/AUTO DIFF WBC: CPT

## 2024-07-16 PROCEDURE — 36415 COLL VENOUS BLD VENIPUNCTURE: CPT

## 2024-07-16 PROCEDURE — 80061 LIPID PANEL: CPT

## 2024-07-16 PROCEDURE — 80053 COMPREHEN METABOLIC PANEL: CPT

## 2024-07-16 PROCEDURE — 83036 HEMOGLOBIN GLYCOSYLATED A1C: CPT

## 2024-07-25 ENCOUNTER — OFFICE VISIT (OUTPATIENT)
Dept: FAMILY MEDICINE CLINIC | Facility: CLINIC | Age: 69
End: 2024-07-25
Payer: MEDICARE

## 2024-07-25 VITALS
SYSTOLIC BLOOD PRESSURE: 98 MMHG | WEIGHT: 112.6 LBS | DIASTOLIC BLOOD PRESSURE: 58 MMHG | BODY MASS INDEX: 17.06 KG/M2 | HEART RATE: 76 BPM | HEIGHT: 68 IN | OXYGEN SATURATION: 98 % | TEMPERATURE: 96.8 F

## 2024-07-25 DIAGNOSIS — Z12.5 SCREENING FOR PROSTATE CANCER: ICD-10-CM

## 2024-07-25 DIAGNOSIS — I25.10 CORONARY ARTERY DISEASE INVOLVING NATIVE CORONARY ARTERY OF NATIVE HEART WITHOUT ANGINA PECTORIS: Primary | ICD-10-CM

## 2024-07-25 DIAGNOSIS — M47.816 LUMBAR SPONDYLOSIS: ICD-10-CM

## 2024-07-25 DIAGNOSIS — G89.4 CHRONIC PAIN DISORDER: ICD-10-CM

## 2024-07-25 DIAGNOSIS — Z12.11 SCREEN FOR COLON CANCER: ICD-10-CM

## 2024-07-25 DIAGNOSIS — R73.9 ELEVATED BLOOD SUGAR: ICD-10-CM

## 2024-07-25 DIAGNOSIS — Z11.59 NEED FOR HEPATITIS C SCREENING TEST: ICD-10-CM

## 2024-07-25 DIAGNOSIS — E78.5 DYSLIPIDEMIA: ICD-10-CM

## 2024-07-25 DIAGNOSIS — C61 PROSTATE CANCER (HCC): ICD-10-CM

## 2024-07-25 DIAGNOSIS — F17.210 SMOKING GREATER THAN 20 PACK YEARS: ICD-10-CM

## 2024-07-25 DIAGNOSIS — M51.16 LUMBAR DISC DISEASE WITH RADICULOPATHY: ICD-10-CM

## 2024-07-25 DIAGNOSIS — E78.2 MIXED HYPERLIPIDEMIA: ICD-10-CM

## 2024-07-25 PROCEDURE — G2211 COMPLEX E/M VISIT ADD ON: HCPCS | Performed by: INTERNAL MEDICINE

## 2024-07-25 PROCEDURE — 99214 OFFICE O/P EST MOD 30 MIN: CPT | Performed by: INTERNAL MEDICINE

## 2024-07-25 NOTE — ASSESSMENT & PLAN NOTE
Remains on tramadol for pain, sees pain management.  Is hoping to get a rhizotomy in the near future as this has helped in the past.

## 2024-07-25 NOTE — ASSESSMENT & PLAN NOTE
Pain with ambulatory function and restricts activity, he does walk daily about half a mile.  Able to continue to do some manual labor around the house but not extensive.

## 2024-07-25 NOTE — PROGRESS NOTES
Ambulatory Visit  Name: Arslan Sanderson      : 1955      MRN: [id number]  Encounter Provider: Jose Ansari DO  Encounter Date: 2024   Encounter department: [facility]    Assessment & Plan   1. Coronary artery disease involving native coronary artery of native heart without angina pectoris  Assessment & Plan:  Continue on aspirin alone.  Remains on moderate dose statin.  Blood pressures are well-controlled  Orders:  -     CBC and differential; Future; Expected date: 2025  -     Comprehensive metabolic panel; Future; Expected date: 2025  2. Dyslipidemia  Assessment & Plan:  Lipids are under excellent control.    3. Lumbar spondylosis  Assessment & Plan:  Pain with ambulatory function and restricts activity, he does walk daily about half a mile.  Able to continue to do some manual labor around the house but not extensive.  4. Lumbar disc disease with radiculopathy  Assessment & Plan:  Remains on tramadol for pain, sees pain management.  Is hoping to get a rhizotomy in the near future as this has helped in the past.  5. Prostate cancer (HCC)  Assessment & Plan:  Remains in remission with undetectable PSA.  Will recheck in January.  6. Chronic pain disorder  7. Elevated blood sugar  -     Hemoglobin A1C; Future; Expected date: 2025  8. Screening for prostate cancer  -     PSA, Total Screen; Future; Expected date: 2025  9. Need for hepatitis C screening test  -     Hepatitis C Antibody; Future; Expected date: 2025  10. Smoking greater than 20 pack years  -     CT lung screening program; Future; Expected date: 2024  11. Screen for colon cancer  -     Cologuard  -     Hemoglobin A1C; Future; Expected date: 2025  12. Mixed hyperlipidemia  -     Lipid Panel with Direct LDL reflex; Future; Expected date: 2025      Depression Screening and Follow-up Plan: Patient was screened for depression during today's encounter. They screened negative  with a PHQ-2 score of 2.    Lung Cancer Screening Shared Decision Making: I discussed with him that he is a candidate for lung cancer CT screening.     The following Shared Decision-Making points were covered:  Benefits of screening were discussed, including the rates of reduction in death from lung cancer and other causes.  Harms of screening were reviewed, including false positive tests, radiation exposure levels, risks of invasive procedures, risks of complications of screening, and risk of overdiagnosis.  I counseled on the importance of adherence to annual lung cancer LDCT screening, impact of co-morbidities, and ability or willingness to undergo diagnosis and treatment.  I counseled on the importance of maintaining abstinence as a former smoker or was counseled on the importance of smoking cessation if a current smoker    Review of Eligibility Criteria: He meets all of the criteria for Lung Cancer Screening.   - He is 69 y.o.   - He has 20 pack year tobacco history and is a current smoker or has quit within the past 15 years  - He presents no signs or symptoms of lung cancer    After discussion, the patient decided to elect lung cancer screening.      History of Present Illness     HPI    Review of Systems   Constitutional:  Negative for chills and fever.   HENT:  Negative for ear pain, rhinorrhea and sore throat.    Eyes:  Negative for pain, redness and visual disturbance.   Respiratory:  Negative for cough and shortness of breath.    Cardiovascular:  Negative for chest pain and leg swelling.   Gastrointestinal:  Negative for abdominal pain, diarrhea, nausea and vomiting.   Genitourinary:  Negative for dysuria, flank pain, frequency and urgency.   Musculoskeletal:  Positive for back pain, myalgias and neck pain.   Skin:  Negative for rash.   Neurological:  Negative for dizziness, weakness, light-headedness and headaches.   Hematological: Negative.    Psychiatric/Behavioral:  Negative for agitation and  "confusion.    All other systems reviewed and are negative.      Objective     BP 98/58 (BP Location: Left arm, Patient Position: Sitting, Cuff Size: Adult)   Pulse 76   Temp (!) 96.8 °F (36 °C) (Tympanic)   Ht 5' 8\" (1.727 m)   Wt 51.1 kg (112 lb 9.6 oz)   SpO2 98%   BMI 17.12 kg/m²     Physical Exam  Vitals and nursing note reviewed.   Constitutional:       General: He is not in acute distress.     Appearance: Normal appearance. He is well-developed.   HENT:      Head: Normocephalic and atraumatic.      Mouth/Throat:      Mouth: Mucous membranes are moist.   Eyes:      Conjunctiva/sclera: Conjunctivae normal.   Neck:      Comments: Decreased range of motion  Cardiovascular:      Rate and Rhythm: Normal rate and regular rhythm.      Pulses: Normal pulses.      Heart sounds: Normal heart sounds. No murmur heard.  Pulmonary:      Effort: Pulmonary effort is normal. No respiratory distress.      Breath sounds: Normal breath sounds.   Abdominal:      Palpations: Abdomen is soft.      Tenderness: There is no abdominal tenderness.   Musculoskeletal:         General: Tenderness present. No swelling. Normal range of motion.      Cervical back: Neck supple. Tenderness present.   Skin:     General: Skin is warm and dry.      Capillary Refill: Capillary refill takes less than 2 seconds.   Neurological:      General: No focal deficit present.      Mental Status: He is alert and oriented to person, place, and time.   Psychiatric:         Mood and Affect: Mood normal.       Administrative Statements           "

## 2024-07-31 DIAGNOSIS — G89.29 OTHER CHRONIC PAIN: ICD-10-CM

## 2024-07-31 RX ORDER — DULOXETIN HYDROCHLORIDE 60 MG/1
60 CAPSULE, DELAYED RELEASE ORAL DAILY
Qty: 100 CAPSULE | Refills: 1 | Status: SHIPPED | OUTPATIENT
Start: 2024-07-31

## 2024-08-03 LAB — COLOGUARD RESULT REPORTABLE: NEGATIVE

## 2024-09-03 DIAGNOSIS — M47.816 LUMBAR SPONDYLOSIS: ICD-10-CM

## 2024-09-03 DIAGNOSIS — M51.16 LUMBAR DISC DISEASE WITH RADICULOPATHY: ICD-10-CM

## 2024-09-05 RX ORDER — GABAPENTIN 600 MG/1
600 TABLET ORAL 4 TIMES DAILY
Qty: 360 TABLET | Refills: 0 | Status: SHIPPED | OUTPATIENT
Start: 2024-09-05 | End: 2024-12-04

## 2024-09-12 ENCOUNTER — OFFICE VISIT (OUTPATIENT)
Age: 69
End: 2024-09-12
Payer: MEDICARE

## 2024-09-12 VITALS
DIASTOLIC BLOOD PRESSURE: 70 MMHG | HEART RATE: 68 BPM | BODY MASS INDEX: 16.97 KG/M2 | WEIGHT: 112 LBS | SYSTOLIC BLOOD PRESSURE: 106 MMHG | HEIGHT: 68 IN

## 2024-09-12 DIAGNOSIS — F11.20 UNCOMPLICATED OPIOID DEPENDENCE (HCC): ICD-10-CM

## 2024-09-12 DIAGNOSIS — Z79.891 LONG-TERM CURRENT USE OF OPIATE ANALGESIC: ICD-10-CM

## 2024-09-12 DIAGNOSIS — G89.29 CHRONIC MIDLINE LOW BACK PAIN WITHOUT SCIATICA: ICD-10-CM

## 2024-09-12 DIAGNOSIS — M47.816 LUMBAR SPONDYLOSIS: ICD-10-CM

## 2024-09-12 DIAGNOSIS — M54.50 CHRONIC MIDLINE LOW BACK PAIN WITHOUT SCIATICA: ICD-10-CM

## 2024-09-12 DIAGNOSIS — G89.4 CHRONIC PAIN SYNDROME: ICD-10-CM

## 2024-09-12 DIAGNOSIS — G89.4 CHRONIC PAIN DISORDER: Primary | ICD-10-CM

## 2024-09-12 PROCEDURE — 99214 OFFICE O/P EST MOD 30 MIN: CPT | Performed by: NURSE PRACTITIONER

## 2024-09-12 PROCEDURE — G2211 COMPLEX E/M VISIT ADD ON: HCPCS | Performed by: NURSE PRACTITIONER

## 2024-09-12 RX ORDER — TRAMADOL HYDROCHLORIDE 50 MG/1
50 TABLET ORAL 3 TIMES DAILY PRN
Qty: 90 TABLET | Refills: 2 | Status: CANCELLED | OUTPATIENT
Start: 2024-09-12

## 2024-09-12 NOTE — PROGRESS NOTES
Assessment:  1. Chronic pain disorder    2. Chronic midline low back pain without sciatica    3. Lumbar spondylosis        Plan:  While the patient was in the office today, I did have a thorough conversation regarding their chronic pain syndrome, medication management, and treatment plan options.  Patient is being seen for a follow-up visit.  He has been experiencing increasing low back pain.  He was previously treated at [facility] pain management with lumbar facet radiofrequency ablations which reduced his pain by at least 50% until recently.     We will schedule the patient for bilateral L4-5 and L5-S1 facet medial branch blocks with intention of moving forward towards radiofrequency ablation if there is an appropriate diagnostic response. The initial blocks will be performed with 2% lidocaine and if an appropriate response is obtained upon review of the patient's pain diary, a confirmatory block will be scheduled with 0.5% bupivacaine.    Continue tramadol 50 mg 3 times daily if needed for pain.  He did not require refill of this medication during today's visit.    Continue gabapentin 100 mg 4 times daily.  He did not require refill of this medication during today's visit.    Continue Robaxin 500 mg twice daily if needed for spasms.  He did not require refill of this medication during today's visit.    [place] Prescription Drug Monitoring Program report was reviewed and was appropriate     A urine drug screen was collected at today's office visit as part of our medication management protocol. The point of care testing results were appropriate for what was being prescribed. The specimen will be sent for confirmatory testing. The drug screen is medically necessary because the patient is either dependent on opioid medication or is being considered for opioid medication therapy and the results could impact ongoing or future treatment. The drug screen is to evaluate for the presences or absence of prescribed,  non-prescribed, and/or illicit drugs/substances.    There are risks associated with opioid medications, including dependence, addiction and tolerance. The patient understands and agrees to use these medications only as prescribed. Potential side effects of the medications include, but are not limited to, constipation, drowsiness, addiction, impaired judgment and risk of fatal overdose if not taken as prescribed. The patient was warned against driving while taking sedation medications.  Sharing medications is a felony. At this point in time, the patient is showing no signs of addiction, abuse, diversion or suicidal ideation.    The patient will follow-up in 12 weeks for medication prescription refill and reevaluation. The patient was advised to contact the office should their symptoms worsen in the interim. The patient was agreeable and verbalized an understanding.        History of Present Illness:    The patient is a 69 y.o. male last seen on 6/12/2024 who presents for a follow up office visit in regards to chronic pain secondary to chronic pain syndrome, chronic low back pain, lumbar spondylosis.  The patient currently reports complaints of low back pain, intermittent right leg pain.  Current pain level 7/10.  Quality pain is described as sharp, shooting, dull, aching.    Current pain medications includes: Tramadol 50 mg 3 times daily if needed for pain, gabapentin 100 mg 4 times daily, Robaxin 500 mg twice daily if needed for spasms .  The patient reports that this regimen is providing about 50% pain relief.  The patient is reporting no side effects from this pain medication regimen.    Pain Contract Signed: 6/12/24  Last Urine Drug Screen: 9/12/24    I have personally reviewed and/or updated the patient's past medical history, past surgical history, family history, social history, current medications, allergies, and vital signs today.       Review of Systems:    Review of Systems   Constitutional:  Negative for  unexpected weight change.   HENT:  Negative for hearing loss.    Eyes:  Negative for visual disturbance.   Respiratory:  Negative for shortness of breath.    Cardiovascular:  Negative for leg swelling.   Gastrointestinal:  Negative for constipation.   Endocrine: Negative for polyuria.   Genitourinary:  Negative for difficulty urinating.   Musculoskeletal:  Positive for arthralgias, gait problem and myalgias. Negative for joint swelling.        Pain in extremity- rt leg   Skin:  Negative for rash.   Neurological:  Negative for weakness and headaches.   Psychiatric/Behavioral:  Negative for decreased concentration.    All other systems reviewed and are negative.        Past Medical History:   Diagnosis Date    BCC (basal cell carcinoma)     in past left side of nose    Cancer (HCC) 1/2019    Chronic pain disorder     low back and down rle    Coronary artery disease     cabg x 1 2006    Hyperlipidemia     Malignant neoplasm prostate (HCC)        Past Surgical History:   Procedure Laterality Date    COLONOSCOPY      CORONARY ARTERY BYPASS GRAFT  07/10/2006    LIMA to the diagonal artery    INGUINAL HERNIA REPAIR Right 1996    LAMINECTOMY  1/2020    NC LAPS SURG [id number] RPBIC RAD W/NRV SPARING ROBOT N/A 04/22/2019    Procedure: PROSTATECTOMY RADICAL W/ROBOTICS, B/L PELVIC NODE DISSECT;  Surgeon: Taran Adam MD;  Location: [facility];  Service: Urology    SKIN CANCER EXCISION  2016    left side nose    SPINE SURGERY  1/2020    THORACIC SPINE SURGERY      T3-T4, removal of an arachnoid cyst.    US GUIDED PROSTATE BIOPSY  02/28/2019       Family History   Problem Relation Age of Onset    Cancer Father         bladder    Lung cancer Father         metastatic       Social History     Occupational History    Not on file   Tobacco Use    Smoking status: Every Day     Current packs/day: 0.50     Average packs/day: 0.8 packs/day for 30.7 years (25.8 ttl pk-yrs)     Types: Cigarettes     Start date: 1994    Smokeless  "tobacco: Never   Vaping Use    Vaping status: Never Used   Substance and Sexual Activity    Alcohol use: Not Currently    Drug use: Never    Sexual activity: Not Currently     Partners: Female     Birth control/protection: None         Current Outpatient Medications:     acetaminophen (TYLENOL) 500 mg tablet, Take 500 mg by mouth Three times a day, Disp: , Rfl:     aspirin (ECOTRIN LOW STRENGTH) 81 mg EC tablet, Take 81 mg by mouth daily, Disp: , Rfl:     DULoxetine (CYMBALTA) 60 mg delayed release capsule, TAKE 1 CAPSULE EVERY DAY, Disp: 100 capsule, Rfl: 1    gabapentin (NEURONTIN) 600 MG tablet, Take 1 tablet (600 mg total) by mouth 4 (four) times a day, Disp: 360 tablet, Rfl: 0    ibuprofen (MOTRIN) 200 mg tablet, Take by mouth every 6 (six) hours as needed for mild pain, Disp: , Rfl:     Melatonin 10 MG TABS, Take by mouth, Disp: , Rfl:     methocarbamol (ROBAXIN) 500 mg tablet, TAKE 1 TABLET TWICE DAILY, Disp: 180 tablet, Rfl: 3    rosuvastatin (CRESTOR) 20 MG tablet, Take 1 tablet (20 mg total) by mouth daily, Disp: 90 tablet, Rfl: 5    traMADol (ULTRAM) 50 mg tablet, Take 1 tablet (50 mg total) by mouth 3 (three) times a day as needed for moderate pain or severe pain Do not start before June 27, 2024., Disp: 90 tablet, Rfl: 2    Allergies   Allergen Reactions    Atorvastatin        Physical Exam:    /70   Pulse 68   Ht 5' 8\" (1.727 m)   Wt 50.8 kg (112 lb)   BMI 17.03 kg/m²     Constitutional:normal, well developed, well nourished, alert, in no distress and non-toxic and no overt pain behavior.  Eyes:anicteric  HEENT:grossly intact  Neck:supple, symmetric, trachea midline and no masses   Pulmonary:even and unlabored  Cardiovascular:No edema or pitting edema present  Skin:Normal without rashes or lesions and well hydrated  Psychiatric:Mood and affect appropriate  Neurologic:Cranial Nerves II-XII grossly intact  Musculoskeletal: Range of motion of the lumbar spine is limited in all planes.  There is " tenderness bilaterally L4-S1.  There are lumbar paraspinal muscle spasms present.      Imaging  FL spine and pain procedure    (Results Pending)         Orders Placed This Encounter   Procedures    [place] spine and pain procedure

## 2024-09-14 DIAGNOSIS — M51.16 LUMBAR DISC DISEASE WITH RADICULOPATHY: ICD-10-CM

## 2024-09-14 DIAGNOSIS — M54.9 MID BACK PAIN: ICD-10-CM

## 2024-09-14 DIAGNOSIS — M47.816 LUMBAR SPONDYLOSIS: ICD-10-CM

## 2024-09-14 LAB
6MAM UR QL CFM: NEGATIVE NG/ML
7AMINOCLONAZEPAM UR QL CFM: NEGATIVE NG/ML
A-OH ALPRAZ UR QL CFM: NEGATIVE NG/ML
ACCEPTABLE CREAT UR QL: ABNORMAL MG/DL
ACCEPTIBLE SP GR UR QL: ABNORMAL
AMPHET UR QL CFM: NEGATIVE NG/ML
AMPHET UR QL CFM: NEGATIVE NG/ML
BUPRENORPHINE UR QL CFM: NEGATIVE NG/ML
BUTALBITAL UR QL CFM: NEGATIVE NG/ML
BZE UR QL CFM: NEGATIVE NG/ML
CODEINE UR QL CFM: NEGATIVE NG/ML
DESIPRAMINE UR QL CFM: NEGATIVE NG/ML
EDDP UR QL CFM: NEGATIVE NG/ML
ETHYL GLUCURONIDE UR QL CFM: NEGATIVE NG/ML
ETHYL SULFATE UR QL SCN: NEGATIVE NG/ML
FENTANYL UR QL CFM: NEGATIVE NG/ML
GLIADIN IGG SER IA-ACNC: NEGATIVE NG/ML
GLUCOSE 30M P 50 G LAC PO SERPL-MCNC: NEGATIVE NG/ML
HYDROCODONE UR QL CFM: NEGATIVE NG/ML
HYDROCODONE UR QL CFM: NEGATIVE NG/ML
HYDROMORPHONE UR QL CFM: NEGATIVE NG/ML
LORAZEPAM UR QL CFM: NEGATIVE NG/ML
MDMA UR QL CFM: NEGATIVE NG/ML
ME-PHENIDATE UR QL CFM: NEGATIVE NG/ML
MEPERIDINE UR QL CFM: NEGATIVE NG/ML
METHADONE UR QL CFM: NEGATIVE NG/ML
METHAMPHET UR QL CFM: NEGATIVE NG/ML
MORPHINE UR QL CFM: NEGATIVE NG/ML
MORPHINE UR QL CFM: NEGATIVE NG/ML
NITRITE UR QL: NORMAL UG/ML
NORBUPRENORPHINE UR QL CFM: NEGATIVE NG/ML
NORDIAZEPAM UR QL CFM: NEGATIVE NG/ML
NORFENTANYL UR QL CFM: NEGATIVE NG/ML
NORHYDROCODONE UR QL CFM: NEGATIVE NG/ML
NORHYDROCODONE UR QL CFM: NEGATIVE NG/ML
NORMEPERIDINE UR QL CFM: NEGATIVE NG/ML
NOROXYCODONE UR QL CFM: NEGATIVE NG/ML
OLANZAPINE QUANTIFICATION: NEGATIVE NG/ML
OPC-3373 QUANTIFICATION: NEGATIVE NG/ML
OXAZEPAM UR QL CFM: NEGATIVE NG/ML
OXYCODONE UR QL CFM: NEGATIVE NG/ML
OXYMORPHONE UR QL CFM: NEGATIVE NG/ML
OXYMORPHONE UR QL CFM: NEGATIVE NG/ML
PARA-FLUOROFENTANYL QUANTIFICATION: NORMAL NG/ML
PCP UR QL CFM: NEGATIVE NG/ML
PHENOBARB UR QL CFM: NEGATIVE NG/ML
RESULT ALL_PRESCRIBED MEDS AND SPECIAL INSTRUCTIONS: NORMAL
SECOBARBITAL UR QL CFM: NEGATIVE NG/ML
SL AMB 7-OH-MITRAGYNINE (KRATOM ALKALOID) QUANTIFICATION: NEGATIVE NG/ML
SL AMB ACETYL FENTANYL QUANTIFICATION: NORMAL NG/ML
SL AMB ACETYL NORFENTANYL QUANTIFICATION: NORMAL NG/ML
SL AMB ACRYL FENTANYL QUANTIFICATION: NORMAL NG/ML
SL AMB CARFENTANIL QUANTIFICATION: NORMAL NG/ML
SL AMB CLOZAPINE QUANTIFICATION: NEGATIVE NG/ML
SL AMB CTHC (MARIJUANA METABOLITE) QUANTIFICATION: NEGATIVE NG/ML
SL AMB DEXTRORPHAN (DEXTROMETHORPHAN METABOLITE) QUANT: NEGATIVE NG/ML
SL AMB HALOPERIDOL  QUANTIFICATION: NEGATIVE NG/ML
SL AMB HALOPERIDOL METABOLITE QUANTIFICATION: NEGATIVE NG/ML
SL AMB HYDROXYRISPERIDONE QUANTIFICATION: NEGATIVE NG/ML
SL AMB N-DESMETHYL-TRAMADOL QUANTIFICATION: NORMAL NG/ML
SL AMB N-DESMETHYLCLOZAPINE QUANTIFICATION: NEGATIVE NG/ML
SL AMB NORQUETIAPINE QUANTIFICATION: NEGATIVE NG/ML
SL AMB PHENTERMINE QUANTIFICATION: NEGATIVE NG/ML
SL AMB PREGABALIN QUANTIFICATION: NEGATIVE NG/ML
SL AMB QUETIAPINE QUANTIFICATION: NEGATIVE NG/ML
SL AMB RISPERIDONE QUANTIFICATION: NEGATIVE NG/ML
SL AMB RITALINIC ACID QUANTIFICATION: NEGATIVE NG/ML
SPECIMEN PH ACCEPTABLE UR: NORMAL
TAPENTADOL UR QL CFM: NEGATIVE NG/ML
TEMAZEPAM UR QL CFM: NEGATIVE NG/ML
TEMAZEPAM UR QL CFM: NEGATIVE NG/ML
TRAMADOL UR QL CFM: NORMAL NG/ML
URATE/CREAT 24H UR: NORMAL NG/ML

## 2024-09-16 RX ORDER — METHOCARBAMOL 500 MG/1
TABLET, FILM COATED ORAL
Qty: 180 TABLET | Refills: 3 | Status: SHIPPED | OUTPATIENT
Start: 2024-09-16

## 2024-10-11 ENCOUNTER — HOSPITAL ENCOUNTER (OUTPATIENT)
Dept: CT IMAGING | Facility: HOSPITAL | Age: 69
End: 2024-10-11
Attending: INTERNAL MEDICINE
Payer: MEDICARE

## 2024-10-11 DIAGNOSIS — F17.210 SMOKING GREATER THAN 20 PACK YEARS: ICD-10-CM

## 2024-10-11 PROCEDURE — 71271 CT THORAX LUNG CANCER SCR C-: CPT

## 2024-11-05 ENCOUNTER — HOSPITAL ENCOUNTER (OUTPATIENT)
Dept: RADIOLOGY | Facility: HOSPITAL | Age: 69
Discharge: HOME | End: 2024-11-05
Payer: MEDICARE

## 2024-11-05 VITALS
OXYGEN SATURATION: 99 % | RESPIRATION RATE: 18 BRPM | DIASTOLIC BLOOD PRESSURE: 65 MMHG | HEART RATE: 69 BPM | SYSTOLIC BLOOD PRESSURE: 113 MMHG | TEMPERATURE: 97.1 F

## 2024-11-05 DIAGNOSIS — G89.29 CHRONIC MIDLINE LOW BACK PAIN WITHOUT SCIATICA: ICD-10-CM

## 2024-11-05 DIAGNOSIS — M54.50 CHRONIC MIDLINE LOW BACK PAIN WITHOUT SCIATICA: ICD-10-CM

## 2024-11-05 DIAGNOSIS — M47.816 LUMBAR SPONDYLOSIS: ICD-10-CM

## 2024-11-05 PROCEDURE — 64493 INJ PARAVERT F JNT L/S 1 LEV: CPT | Performed by: ANESTHESIOLOGY

## 2024-11-05 PROCEDURE — 64494 INJ PARAVERT F JNT L/S 2 LEV: CPT | Performed by: ANESTHESIOLOGY

## 2024-11-05 RX ORDER — LIDOCAINE HYDROCHLORIDE 10 MG/ML
5 INJECTION, SOLUTION EPIDURAL; INFILTRATION; INTRACAUDAL; PERINEURAL ONCE
Status: DISCONTINUED | OUTPATIENT
Start: 2024-11-05 | End: 2024-11-09 | Stop reason: HOSPADM

## 2024-11-05 RX ADMIN — Medication 4 ML: at 10:58

## 2024-11-05 NOTE — H&P
Assessment:  1. Lumbar spondylosis  FL spine and pain procedure    FL spine and pain procedure      2. Chronic midline low back pain without sciatica  FL spine and pain procedure    FL spine and pain procedure          Plan:  Arslan Sanderson is a 69 y.o. male with complaints of low back pain presents to surgical center for procedure.  We will perform a bilateral L4-L5 and L5-S1 medial branch block #1  2. Follow-up 1 month after injection    Complete risks and benefits including bleeding, infection, tissue reaction, nerve injury and allergic reaction were discussed. The approach was demonstrated using models and literature was provided. Verbal and written consent was obtained.    My impressions and treatment recommendations were discussed in detail with the patient who verbalized understanding and had no further questions.  Discharge instructions were provided. I personally saw and examined the patient and I agree with the above discussed plan of care.    Orders Placed This Encounter   Procedures    FL spine and pain procedure     Standing Status:   Standing     Number of Occurrences:   1     Order Specific Question:   Reason for Exam:     Answer:   B/L L4-5 and L5-S1 MBB #1     Order Specific Question:   Anticoagulant hold needed?     Answer:   baby ASA     No orders of the defined types were placed in this encounter.      History of Present Illness:  Arslan Sanderson is a 69 y.o. male who presents for a follow up office visit in regards to low back pain.   The patient’s current symptoms include 8 out of 10 sharp constant throbbing.  10 particular time.      I have personally reviewed and/or updated the patient's past medical history, past surgical history, family history, social history, current medications, allergies, and vital signs today.     Review of Systems   Musculoskeletal:  Positive for arthralgias and back pain.   All other systems reviewed and are negative.      Patient Active Problem List   Diagnosis    Coronary  artery disease involving native coronary artery of native heart without angina pectoris    Dyslipidemia    Dural tear    Long-term current use of opiate analgesic    Chronic pain disorder    Lumbar disc disease with radiculopathy    Lumbar spondylosis    Elevated blood sugar    Prostate cancer (HCC)       Past Medical History:   Diagnosis Date    BCC (basal cell carcinoma)     in past left side of nose    Cancer (HCC) 1/2019    Chronic pain disorder     low back and down rle    Coronary artery disease     cabg x 1 2006    Hyperlipidemia     Malignant neoplasm prostate (HCC)        Past Surgical History:   Procedure Laterality Date    COLONOSCOPY      CORONARY ARTERY BYPASS GRAFT  07/10/2006    LIMA to the diagonal artery    INGUINAL HERNIA REPAIR Right 1996    LAMINECTOMY  1/2020    [place] LAPS SURG [id number] RPBIC RAD W/NRV SPARING ROBOT N/A 04/22/2019    Procedure: PROSTATECTOMY RADICAL W/ROBOTICS, B/L PELVIC NODE DISSECT;  Surgeon: Taran Adam MD;  Location: [facility];  Service: Urology    SKIN CANCER EXCISION  2016    left side nose    SPINE SURGERY  1/2020    THORACIC SPINE SURGERY      T3-T4, removal of an arachnoid cyst.    US GUIDED PROSTATE BIOPSY  02/28/2019       Family History   Problem Relation Age of Onset    Cancer Father         bladder    Lung cancer Father         metastatic       Social History     Occupational History    Not on file   Tobacco Use    Smoking status: Every Day     Current packs/day: 0.50     Average packs/day: 0.8 packs/day for 30.8 years (25.9 ttl pk-yrs)     Types: Cigarettes     Start date: 1994    Smokeless tobacco: Never   Vaping Use    Vaping status: Never Used   Substance and Sexual Activity    Alcohol use: Not Currently    Drug use: Never    Sexual activity: Not Currently     Partners: Female     Birth control/protection: None       Current Outpatient Medications on File Prior to Encounter   Medication Sig    acetaminophen (TYLENOL) 500 mg tablet Take 500 mg by mouth Three  times a day    aspirin (ECOTRIN LOW STRENGTH) 81 mg EC tablet Take 81 mg by mouth daily    DULoxetine (CYMBALTA) 60 mg delayed release capsule TAKE 1 CAPSULE EVERY DAY    gabapentin (NEURONTIN) 600 MG tablet Take 1 tablet (600 mg total) by mouth 4 (four) times a day    ibuprofen (MOTRIN) 200 mg tablet Take by mouth every 6 (six) hours as needed for mild pain    Melatonin 10 MG TABS Take by mouth    methocarbamol (ROBAXIN) 500 mg tablet TAKE 1 TABLET TWICE DAILY    rosuvastatin (CRESTOR) 20 MG tablet Take 1 tablet (20 mg total) by mouth daily    traMADol (ULTRAM) 50 mg tablet Take 1 tablet (50 mg total) by mouth 3 (three) times a day as needed for moderate pain or severe pain Do not start before June 27, 2024.     No current facility-administered medications on file prior to encounter.       Allergies   Allergen Reactions    Atorvastatin        Physical Exam:    /65 (BP Location: Left arm)   Pulse 69   Temp (!) 97.1 °F (36.2 °C) (Temporal)   Resp 18   SpO2 99%     Constitutional:normal, well developed, well nourished, alert, in no distress and non-toxic and no overt pain behavior.  Eyes:anicteric  HEENT:grossly intact  Neck:supple, symmetric, trachea midline and no masses   Pulmonary:even and unlabored  Cardiovascular:No edema or pitting edema present  Skin:Normal without rashes or lesions and well hydrated  Psychiatric:Mood and affect appropriate  Neurologic:Cranial Nerves II-XII grossly intact  Musculoskeletal:normal

## 2024-11-05 NOTE — DISCHARGE INSTR - LAB

## 2024-11-07 ENCOUNTER — TELEPHONE (OUTPATIENT)
Age: 69
End: 2024-11-07

## 2024-11-08 ENCOUNTER — TELEPHONE (OUTPATIENT)
Dept: PAIN MEDICINE | Facility: CLINIC | Age: 69
End: 2024-11-08

## 2024-12-02 DIAGNOSIS — M51.16 LUMBAR DISC DISEASE WITH RADICULOPATHY: ICD-10-CM

## 2024-12-02 DIAGNOSIS — M47.816 LUMBAR SPONDYLOSIS: ICD-10-CM

## 2024-12-02 NOTE — TELEPHONE ENCOUNTER
[id number] 10/20/2024 06/12/2024 traMADol HCL (Tablet) 90.0 30 50 MG 30.0 [facility], [facility]. Medicare 2 / 2 [place]    1 [id number] 09/03/2024 06/12/2024 traMADol HCL (Tablet) 90.0 30 50 MG 30.0 [facility]

## 2024-12-04 RX ORDER — TRAMADOL HYDROCHLORIDE 50 MG/1
50 TABLET ORAL 3 TIMES DAILY PRN
Qty: 45 TABLET | Refills: 0 | Status: SHIPPED | OUTPATIENT
Start: 2024-12-04 | End: 2024-12-19

## 2024-12-13 ENCOUNTER — HOSPITAL ENCOUNTER (OUTPATIENT)
Dept: RADIOLOGY | Facility: HOSPITAL | Age: 69
End: 2024-12-13
Payer: MEDICARE

## 2024-12-13 VITALS
DIASTOLIC BLOOD PRESSURE: 68 MMHG | TEMPERATURE: 96.1 F | OXYGEN SATURATION: 98 % | RESPIRATION RATE: 20 BRPM | SYSTOLIC BLOOD PRESSURE: 118 MMHG | HEART RATE: 67 BPM

## 2024-12-13 DIAGNOSIS — M47.816 LUMBAR SPONDYLOSIS: ICD-10-CM

## 2024-12-13 PROCEDURE — 64494 INJ PARAVERT F JNT L/S 2 LEV: CPT | Performed by: ANESTHESIOLOGY

## 2024-12-13 PROCEDURE — 64493 INJ PARAVERT F JNT L/S 1 LEV: CPT | Performed by: ANESTHESIOLOGY

## 2024-12-13 RX ORDER — BUPIVACAINE HYDROCHLORIDE 5 MG/ML
3 INJECTION, SOLUTION EPIDURAL; INTRACAUDAL ONCE
Status: COMPLETED | OUTPATIENT
Start: 2024-12-13 | End: 2024-12-13

## 2024-12-13 RX ADMIN — BUPIVACAINE HYDROCHLORIDE 3 ML: 5 INJECTION, SOLUTION EPIDURAL; INTRACAUDAL at 11:35

## 2024-12-13 NOTE — H&P
Assessment:  1. Lumbar spondylosis  FL spine and pain procedure    FL spine and pain procedure          Plan:  Arslan Sanderson is a 69 y.o. male with complaints of low back pain presents to surgical center for procedure.  We will perform a bilateral L4-L5 and L5-S1 medial branch block #2  2. Follow-up 1 month after injection    Complete risks and benefits including bleeding, infection, tissue reaction, nerve injury and allergic reaction were discussed. The approach was demonstrated using models and literature was provided. Verbal and written consent was obtained.    My impressions and treatment recommendations were discussed in detail with the patient who verbalized understanding and had no further questions.  Discharge instructions were provided. I personally saw and examined the patient and I agree with the above discussed plan of care.    Orders Placed This Encounter   Procedures    FL spine and pain procedure     Standing Status:   Standing     Number of Occurrences:   1     Reason for Exam::   B/L L4-5 and L5-S1 MBB2     Anticoagulant hold needed?:   no     New Medications Ordered This Visit   Medications    bupivacaine (PF) (MARCAINE) 0.5 % injection 3 mL       History of Present Illness:  Arslan Sandesron is a 69 y.o. male who presents for a follow up office visit in regards to low back pain.   The patient’s current symptoms include 8 out of 10 sharp constant confirmed to therapeutic with heparin.      I have personally reviewed and/or updated the patient's past medical history, past surgical history, family history, social history, current medications, allergies, and vital signs today.     Review of Systems   Musculoskeletal:  Positive for arthralgias and back pain.   All other systems reviewed and are negative.      Patient Active Problem List   Diagnosis    Coronary artery disease involving native coronary artery of native heart without angina pectoris    Dyslipidemia    Dural tear    Long-term current use of  opiate analgesic    Chronic pain disorder    Lumbar disc disease with radiculopathy    Lumbar spondylosis    Elevated blood sugar    Prostate cancer (HCC)    Chronic midline low back pain without sciatica       Past Medical History:   Diagnosis Date    BCC (basal cell carcinoma)     in past left side of nose    Cancer (HCC) 1/2019    Chronic pain disorder     low back and down rle    Coronary artery disease     cabg x 1 2006    Hyperlipidemia     Malignant neoplasm prostate (HCC)        Past Surgical History:   Procedure Laterality Date    COLONOSCOPY      CORONARY ARTERY BYPASS GRAFT  07/10/2006    LIMA to the diagonal artery    INGUINAL HERNIA REPAIR Right 1996    LAMINECTOMY  1/2020    [place] LAPS SURG [id number] RPBIC RAD W/NRV SPARING ROBOT N/A 04/22/2019    Procedure: PROSTATECTOMY RADICAL W/ROBOTICS, B/L PELVIC NODE DISSECT;  Surgeon: Taran Adam MD;  Location: [facility];  Service: Urology    SKIN CANCER EXCISION  2016    left side nose    SPINE SURGERY  1/2020    THORACIC SPINE SURGERY      T3-T4, removal of an arachnoid cyst.    US GUIDED PROSTATE BIOPSY  02/28/2019       Family History   Problem Relation Age of Onset    Cancer Father         bladder    Lung cancer Father         metastatic       Social History     Occupational History    Not on file   Tobacco Use    Smoking status: Every Day     Current packs/day: 0.50     Average packs/day: 0.8 packs/day for 30.9 years (26.0 ttl pk-yrs)     Types: Cigarettes     Start date: 1994    Smokeless tobacco: Never   Vaping Use    Vaping status: Never Used   Substance and Sexual Activity    Alcohol use: Not Currently    Drug use: Never    Sexual activity: Not Currently     Partners: Female     Birth control/protection: None       Current Outpatient Medications on File Prior to Encounter   Medication Sig    acetaminophen (TYLENOL) 500 mg tablet Take 500 mg by mouth Three times a day    aspirin (ECOTRIN LOW STRENGTH) 81 mg EC tablet Take 81 mg by mouth daily     DULoxetine (CYMBALTA) 60 mg delayed release capsule TAKE 1 CAPSULE EVERY DAY    gabapentin (NEURONTIN) 600 MG tablet Take 1 tablet (600 mg total) by mouth 4 (four) times a day    ibuprofen (MOTRIN) 200 mg tablet Take by mouth every 6 (six) hours as needed for mild pain    Melatonin 10 MG TABS Take by mouth    methocarbamol (ROBAXIN) 500 mg tablet TAKE 1 TABLET TWICE DAILY    rosuvastatin (CRESTOR) 20 MG tablet Take 1 tablet (20 mg total) by mouth daily    traMADol (ULTRAM) 50 mg tablet Take 1 tablet (50 mg total) by mouth 3 (three) times a day as needed for moderate pain or severe pain for up to 15 days     No current facility-administered medications on file prior to encounter.       Allergies   Allergen Reactions    Atorvastatin        Physical Exam:    /74   Pulse 75   Temp (!) 96.1 °F (35.6 °C) (Tympanic)   Resp 20   SpO2 100%     Constitutional:normal, well developed, well nourished, alert, in no distress and non-toxic and no overt pain behavior.  Eyes:anicteric  HEENT:grossly intact  Neck:supple, symmetric, trachea midline and no masses   Pulmonary:even and unlabored  Cardiovascular:No edema or pitting edema present  Skin:Normal without rashes or lesions and well hydrated  Psychiatric:Mood and affect appropriate  Neurologic:Cranial Nerves II-XII grossly intact  Musculoskeletal:normal

## 2024-12-19 ENCOUNTER — OFFICE VISIT (OUTPATIENT)
Age: 69
End: 2024-12-19
Payer: MEDICARE

## 2024-12-19 VITALS — HEIGHT: 68 IN | BODY MASS INDEX: 17.03 KG/M2

## 2024-12-19 DIAGNOSIS — M47.816 LUMBAR SPONDYLOSIS: ICD-10-CM

## 2024-12-19 DIAGNOSIS — M54.50 CHRONIC MIDLINE LOW BACK PAIN WITHOUT SCIATICA: ICD-10-CM

## 2024-12-19 DIAGNOSIS — M51.16 LUMBAR DISC DISEASE WITH RADICULOPATHY: ICD-10-CM

## 2024-12-19 DIAGNOSIS — G89.29 CHRONIC MIDLINE LOW BACK PAIN WITHOUT SCIATICA: ICD-10-CM

## 2024-12-19 DIAGNOSIS — G89.4 CHRONIC PAIN DISORDER: Primary | ICD-10-CM

## 2024-12-19 PROCEDURE — 99214 OFFICE O/P EST MOD 30 MIN: CPT | Performed by: NURSE PRACTITIONER

## 2024-12-19 PROCEDURE — G2211 COMPLEX E/M VISIT ADD ON: HCPCS | Performed by: NURSE PRACTITIONER

## 2024-12-19 RX ORDER — TRAMADOL HYDROCHLORIDE 50 MG/1
50 TABLET ORAL 3 TIMES DAILY PRN
Qty: 90 TABLET | Refills: 2 | Status: CANCELLED | OUTPATIENT
Start: 2024-12-19 | End: 2025-03-19

## 2024-12-19 RX ORDER — TRAMADOL HYDROCHLORIDE 50 MG/1
50 TABLET ORAL 3 TIMES DAILY PRN
Qty: 90 TABLET | Refills: 2 | Status: SHIPPED | OUTPATIENT
Start: 2024-12-19 | End: 2025-03-19

## 2024-12-19 RX ORDER — GABAPENTIN 600 MG/1
600 TABLET ORAL 4 TIMES DAILY
Qty: 360 TABLET | Refills: 0 | Status: SHIPPED | OUTPATIENT
Start: 2024-12-19 | End: 2025-03-19

## 2024-12-19 NOTE — PROGRESS NOTES
Assessment:  1. Chronic pain disorder    2. Chronic midline low back pain without sciatica    3. Lumbar spondylosis    4. Lumbar disc disease with radiculopathy        Plan:  While the patient was in the office today, I did have a thorough conversation regarding their chronic pain syndrome, medication management, and treatment plan options.  Patient is being seen for a follow-up visit.  He recently underwent a second L4-5 and L5-S1 medial branch block on 12/13/2024.  Patient reports that his pain improved by more than 80% for about 12 hours.  He is scheduled for left and right L4-5 and L5-S1 radiofrequency ablations.    Renewed tramadol 50 mg 3 times daily if needed for pain.  A prescription was sent to his pharmacy with 2 refills.    Continue gabapentin 100 mg 4 times daily.  A 90-day supply of this medication was sent to his mail order pharmacy.    Continue Robaxin 500 mg twice daily if needed for spasms.  He did not require refill of this medication during today's visit.    [place] Prescription Drug Monitoring Program report was reviewed and was appropriate     There are risks associated with opioid medications, including dependence, addiction and tolerance. The patient understands and agrees to use these medications only as prescribed. Potential side effects of the medications include, but are not limited to, constipation, drowsiness, addiction, impaired judgment and risk of fatal overdose if not taken as prescribed. The patient was warned against driving while taking sedation medications.  Sharing medications is a felony. At this point in time, the patient is showing no signs of addiction, abuse, diversion or suicidal ideation.    The patient will follow-up in 12 weeks for medication prescription refill and reevaluation. The patient was advised to contact the office should their symptoms worsen in the interim. The patient was agreeable and verbalized an understanding.        History of Present Illness:     The patient is a 69 y.o. male last seen on 9/12/2024 who presents for a follow up office visit in regards to chronic pain secondary to chronic pain syndrome, chronic low back pain, lumbar spondylosis, lumbar radiculopathy.  The patient currently reports complaints of low back pain that can radiate to both lower extremities at times.  Current pain level is an 8/10.  Quality pain is described as burning, numb, pins-and-needles.    Current pain medications includes: Tramadol 50 mg 3 times daily if needed for pain, gabapentin 100 mg 4 times daily, Robaxin 500 mg twice daily if needed for spasms .  The patient reports that this regimen is providing about 50% pain relief.  The patient is reporting no side effects from this pain medication regimen.    Pain Contract Signed: 6/12/24  Last Urine Drug Screen: 9/12/24    I have personally reviewed and/or updated the patient's past medical history, past surgical history, family history, social history, current medications, allergies, and vital signs today.       Review of Systems:    Review of Systems   Constitutional:  Negative for unexpected weight change.   HENT:  Negative for hearing loss.    Eyes:  Negative for visual disturbance.   Respiratory:  Negative for shortness of breath.    Cardiovascular:  Negative for leg swelling.   Gastrointestinal:  Negative for constipation.   Endocrine: Negative for polyuria.   Genitourinary:  Negative for difficulty urinating.   Musculoskeletal:  Positive for arthralgias and myalgias. Negative for gait problem and joint swelling.        Joint stiffness  Pain in extremity- legs, back   Skin:  Negative for rash.   Neurological:  Negative for weakness and headaches.   Psychiatric/Behavioral:  Negative for decreased concentration.    All other systems reviewed and are negative.        Past Medical History:   Diagnosis Date    BCC (basal cell carcinoma)     in past left side of nose    Cancer (HCC) 1/2019    Chronic pain disorder     low back  and down rle    Coronary artery disease     cabg x 1 2006    Hyperlipidemia     Malignant neoplasm prostate (HCC)        Past Surgical History:   Procedure Laterality Date    COLONOSCOPY      CORONARY ARTERY BYPASS GRAFT  07/10/2006    LIMA to the diagonal artery    INGUINAL HERNIA REPAIR Right 1996    LAMINECTOMY  1/2020    [place] LAPS SURG [id number] RPBIC RAD W/NRV SPARING ROBOT N/A 04/22/2019    Procedure: PROSTATECTOMY RADICAL W/ROBOTICS, B/L PELVIC NODE DISSECT;  Surgeon: Taran Adam MD;  Location[facility];  Service: Urology    SKIN CANCER EXCISION  2016    left side nose    SPINE SURGERY  1/2020    THORACIC SPINE SURGERY      T3-T4, removal of an arachnoid cyst.    US GUIDED PROSTATE BIOPSY  02/28/2019       Family History   Problem Relation Age of Onset    Cancer Father         bladder    Lung cancer Father         metastatic       Social History     Occupational History    Not on file   Tobacco Use    Smoking status: Every Day     Current packs/day: 0.50     Average packs/day: 0.8 packs/day for 31.0 years (26.0 ttl pk-yrs)     Types: Cigarettes     Start date: 1994    Smokeless tobacco: Never   Vaping Use    Vaping status: Never Used   Substance and Sexual Activity    Alcohol use: Not Currently    Drug use: Never    Sexual activity: Not Currently     Partners: Female     Birth control/protection: None         Current Outpatient Medications:     acetaminophen (TYLENOL) 500 mg tablet, Take 500 mg by mouth Three times a day, Disp: , Rfl:     aspirin (ECOTRIN LOW STRENGTH) 81 mg EC tablet, Take 81 mg by mouth daily, Disp: , Rfl:     DULoxetine (CYMBALTA) 60 mg delayed release capsule, TAKE 1 CAPSULE EVERY DAY, Disp: 100 capsule, Rfl: 1    gabapentin (NEURONTIN) 600 MG tablet, Take 1 tablet (600 mg total) by mouth 4 (four) times a day, Disp: 360 tablet, Rfl: 0    ibuprofen (MOTRIN) 200 mg tablet, Take by mouth every 6 (six) hours as needed for mild pain, Disp: , Rfl:     Melatonin 10 MG TABS, Take by mouth,  "Disp: , Rfl:     methocarbamol (ROBAXIN) 500 mg tablet, TAKE 1 TABLET TWICE DAILY, Disp: 180 tablet, Rfl: 3    rosuvastatin (CRESTOR) 20 MG tablet, Take 1 tablet (20 mg total) by mouth daily, Disp: 90 tablet, Rfl: 5    traMADol (ULTRAM) 50 mg tablet, Take 1 tablet (50 mg total) by mouth 3 (three) times a day as needed for moderate pain or severe pain, Disp: 90 tablet, Rfl: 2    Allergies   Allergen Reactions    Atorvastatin        Physical Exam:    Ht 5' 8\" (1.727 m)   BMI 17.03 kg/m²     Constitutional:normal, well developed, well nourished, alert, in no distress and non-toxic and no overt pain behavior.  Eyes:anicteric  HEENT:grossly intact  Neck:supple, symmetric, trachea midline and no masses   Pulmonary:even and unlabored  Cardiovascular:No edema or pitting edema present  Skin:Normal without rashes or lesions and well hydrated  Psychiatric:Mood and affect appropriate  Neurologic:Cranial Nerves II-XII grossly intact  Musculoskeletal: Igait is slow and guarded.  Range of motion of the lumbar spine is limited in all planes.  There are lumbar paraspinal muscle spasms present.      Imaging  No orders to display         No orders of the defined types were placed in this encounter.      "

## 2024-12-25 DIAGNOSIS — G89.29 OTHER CHRONIC PAIN: ICD-10-CM

## 2024-12-26 RX ORDER — DULOXETIN HYDROCHLORIDE 60 MG/1
60 CAPSULE, DELAYED RELEASE ORAL DAILY
Qty: 90 CAPSULE | Refills: 1 | Status: SHIPPED | OUTPATIENT
Start: 2024-12-26

## 2025-01-31 ENCOUNTER — HOSPITAL ENCOUNTER (OUTPATIENT)
Dept: RADIOLOGY | Facility: HOSPITAL | Age: 70
End: 2025-01-31
Payer: MEDICARE

## 2025-01-31 ENCOUNTER — TELEPHONE (OUTPATIENT)
Dept: PAIN MEDICINE | Facility: CLINIC | Age: 70
End: 2025-01-31

## 2025-01-31 ENCOUNTER — APPOINTMENT (OUTPATIENT)
Dept: LAB | Facility: CLINIC | Age: 70
End: 2025-01-31
Payer: MEDICARE

## 2025-01-31 VITALS
SYSTOLIC BLOOD PRESSURE: 113 MMHG | DIASTOLIC BLOOD PRESSURE: 72 MMHG | HEART RATE: 65 BPM | RESPIRATION RATE: 18 BRPM | TEMPERATURE: 96.1 F | OXYGEN SATURATION: 99 %

## 2025-01-31 DIAGNOSIS — M47.816 LUMBAR SPONDYLOSIS: ICD-10-CM

## 2025-01-31 DIAGNOSIS — I25.10 CORONARY ARTERY DISEASE INVOLVING NATIVE CORONARY ARTERY OF NATIVE HEART WITHOUT ANGINA PECTORIS: ICD-10-CM

## 2025-01-31 DIAGNOSIS — Z12.5 SCREENING FOR PROSTATE CANCER: ICD-10-CM

## 2025-01-31 DIAGNOSIS — Z11.59 NEED FOR HEPATITIS C SCREENING TEST: ICD-10-CM

## 2025-01-31 DIAGNOSIS — E78.2 MIXED HYPERLIPIDEMIA: ICD-10-CM

## 2025-01-31 DIAGNOSIS — Z12.11 SCREEN FOR COLON CANCER: ICD-10-CM

## 2025-01-31 DIAGNOSIS — R73.9 ELEVATED BLOOD SUGAR: ICD-10-CM

## 2025-01-31 LAB
ALBUMIN SERPL BCG-MCNC: 4.4 G/DL (ref 3.5–5)
ALP SERPL-CCNC: 65 U/L (ref 34–104)
ALT SERPL W P-5'-P-CCNC: 29 U/L (ref 7–52)
ANION GAP SERPL CALCULATED.3IONS-SCNC: 10 MMOL/L (ref 4–13)
AST SERPL W P-5'-P-CCNC: 24 U/L (ref 13–39)
BASOPHILS # BLD AUTO: 0.09 THOUSANDS/ΜL (ref 0–0.1)
BASOPHILS NFR BLD AUTO: 1 % (ref 0–1)
BILIRUB SERPL-MCNC: 0.49 MG/DL (ref 0.2–1)
BUN SERPL-MCNC: 15 MG/DL (ref 5–25)
CALCIUM SERPL-MCNC: 9.5 MG/DL (ref 8.4–10.2)
CHLORIDE SERPL-SCNC: 108 MMOL/L (ref 96–108)
CHOLEST SERPL-MCNC: 106 MG/DL (ref ?–200)
CO2 SERPL-SCNC: 27 MMOL/L (ref 21–32)
CREAT SERPL-MCNC: 0.93 MG/DL (ref 0.6–1.3)
EOSINOPHIL # BLD AUTO: 0.33 THOUSAND/ΜL (ref 0–0.61)
EOSINOPHIL NFR BLD AUTO: 4 % (ref 0–6)
ERYTHROCYTE [DISTWIDTH] IN BLOOD BY AUTOMATED COUNT: 13.2 % (ref 11.6–15.1)
EST. AVERAGE GLUCOSE BLD GHB EST-MCNC: 123 MG/DL
GFR SERPL CREATININE-BSD FRML MDRD: 83 ML/MIN/1.73SQ M
GLUCOSE P FAST SERPL-MCNC: 70 MG/DL (ref 65–99)
HBA1C MFR BLD: 5.9 %
HCT VFR BLD AUTO: 45 % (ref 36.5–49.3)
HDLC SERPL-MCNC: 51 MG/DL
HGB BLD-MCNC: 14.6 G/DL (ref 12–17)
IMM GRANULOCYTES # BLD AUTO: 0.03 THOUSAND/UL (ref 0–0.2)
IMM GRANULOCYTES NFR BLD AUTO: 0 % (ref 0–2)
LDLC SERPL CALC-MCNC: 36 MG/DL (ref 0–100)
LYMPHOCYTES # BLD AUTO: 2.59 THOUSANDS/ΜL (ref 0.6–4.47)
LYMPHOCYTES NFR BLD AUTO: 31 % (ref 14–44)
MCH RBC QN AUTO: 30.6 PG (ref 26.8–34.3)
MCHC RBC AUTO-ENTMCNC: 32.4 G/DL (ref 31.4–37.4)
MCV RBC AUTO: 94 FL (ref 82–98)
MONOCYTES # BLD AUTO: 0.7 THOUSAND/ΜL (ref 0.17–1.22)
MONOCYTES NFR BLD AUTO: 9 % (ref 4–12)
NEUTROPHILS # BLD AUTO: 4.53 THOUSANDS/ΜL (ref 1.85–7.62)
NEUTS SEG NFR BLD AUTO: 55 % (ref 43–75)
NRBC BLD AUTO-RTO: 0 /100 WBCS
PLATELET # BLD AUTO: 209 THOUSANDS/UL (ref 149–390)
PMV BLD AUTO: 11.5 FL (ref 8.9–12.7)
POTASSIUM SERPL-SCNC: 5.8 MMOL/L (ref 3.5–5.3)
PROT SERPL-MCNC: 6.6 G/DL (ref 6.4–8.4)
PSA SERPL-MCNC: <0.008 NG/ML (ref 0–4)
RBC # BLD AUTO: 4.77 MILLION/UL (ref 3.88–5.62)
SODIUM SERPL-SCNC: 145 MMOL/L (ref 135–147)
TRIGL SERPL-MCNC: 95 MG/DL (ref ?–150)
WBC # BLD AUTO: 8.27 THOUSAND/UL (ref 4.31–10.16)

## 2025-01-31 PROCEDURE — 64636 DESTROY L/S FACET JNT ADDL: CPT | Performed by: ANESTHESIOLOGY

## 2025-01-31 PROCEDURE — 36415 COLL VENOUS BLD VENIPUNCTURE: CPT

## 2025-01-31 PROCEDURE — 80053 COMPREHEN METABOLIC PANEL: CPT

## 2025-01-31 PROCEDURE — 85025 COMPLETE CBC W/AUTO DIFF WBC: CPT

## 2025-01-31 PROCEDURE — G0103 PSA SCREENING: HCPCS

## 2025-01-31 PROCEDURE — 86803 HEPATITIS C AB TEST: CPT

## 2025-01-31 PROCEDURE — 80061 LIPID PANEL: CPT

## 2025-01-31 PROCEDURE — 64635 DESTROY LUMB/SAC FACET JNT: CPT | Performed by: ANESTHESIOLOGY

## 2025-01-31 PROCEDURE — 83036 HEMOGLOBIN GLYCOSYLATED A1C: CPT

## 2025-01-31 RX ORDER — LIDOCAINE HYDROCHLORIDE 10 MG/ML
5 INJECTION, SOLUTION EPIDURAL; INFILTRATION; INTRACAUDAL; PERINEURAL ONCE
Status: COMPLETED | OUTPATIENT
Start: 2025-01-31 | End: 2025-01-31

## 2025-01-31 RX ADMIN — LIDOCAINE HYDROCHLORIDE 5 ML: 10 INJECTION, SOLUTION EPIDURAL; INFILTRATION; INTRACAUDAL; PERINEURAL at 10:46

## 2025-01-31 RX ADMIN — Medication 4 ML: at 10:51

## 2025-01-31 NOTE — TELEPHONE ENCOUNTER
Pt s/p Rt L4-S1 RFA on 1/31/25 @ [facility]    Pt sched for Lt L4-S1 2/14/25 @ 1020 @ [facility]     Pls c/b pt on 2/3/25

## 2025-01-31 NOTE — H&P
Assessment:  1. Lumbar spondylosis  FL spine and pain procedure    FL spine and pain procedure          Plan:  Arslan Sanderson is a 69 y.o. male with complaints of low back pain presents to surgical center for procedure.  We will perform a right L4-L5 and L5-S1 Refixia ablation  2. Follow-up 1 month after injection    Complete risks and benefits including bleeding, infection, tissue reaction, nerve injury and allergic reaction were discussed. The approach was demonstrated using models and literature was provided. Verbal and written consent was obtained.    My impressions and treatment recommendations were discussed in detail with the patient who verbalized understanding and had no further questions.  Discharge instructions were provided. I personally saw and examined the patient and I agree with the above discussed plan of care.    Orders Placed This Encounter   Procedures    FL spine and pain procedure     Standing Status:   Standing     Number of Occurrences:   1     Reason for Exam::   RIGHT L4-5  AND L5-S1 RFA     Anticoagulant hold needed?:   NO     New Medications Ordered This Visit   Medications    lidocaine (PF) (XYLOCAINE-MPF) 1 % injection 5 mL    lidocaine (PF) (XYLOCAINE-MPF) 2 % injection 4 mL       History of Present Illness:  Arslan Sanderson is a 69 y.o. male who presents for a follow up office visit in regards to low back pain.   The patient’s current symptoms include 8 out of 10 intermittent sharp, stabbing pain that particular time pattern.      I have personally reviewed and/or updated the patient's past medical history, past surgical history, family history, social history, current medications, allergies, and vital signs today.     Review of Systems   Musculoskeletal:  Positive for arthralgias and back pain.   All other systems reviewed and are negative.      Patient Active Problem List   Diagnosis    Coronary artery disease involving native coronary artery of native heart without angina pectoris     Dyslipidemia    Dural tear    Long-term current use of opiate analgesic    Chronic pain disorder    Lumbar disc disease with radiculopathy    Lumbar spondylosis    Elevated blood sugar    Prostate cancer (HCC)    Chronic midline low back pain without sciatica       Past Medical History:   Diagnosis Date    BCC (basal cell carcinoma)     in past left side of nose    Cancer (HCC) 1/2019    Chronic pain disorder     low back and down rle    Coronary artery disease     cabg x 1 2006    Hyperlipidemia     Malignant neoplasm prostate (HCC)        Past Surgical History:   Procedure Laterality Date    COLONOSCOPY      CORONARY ARTERY BYPASS GRAFT  07/10/2006    LIMA to the diagonal artery    INGUINAL HERNIA REPAIR Right 1996    LAMINECTOMY  1/2020    [place] LAPS SURG [id number] RPBIC RAD W/NRV SPARING ROBOT N/A 04/22/2019    Procedure: PROSTATECTOMY RADICAL W/ROBOTICS, B/L PELVIC NODE DISSECT;  Surgeon: Taran Adam MD;  Location[facility];  Service: Urology    SKIN CANCER EXCISION  2016    left side nose    SPINE SURGERY  1/2020    THORACIC SPINE SURGERY      T3-T4, removal of an arachnoid cyst.    US GUIDED PROSTATE BIOPSY  02/28/2019       Family History   Problem Relation Age of Onset    Cancer Father         bladder    Lung cancer Father         metastatic       Social History     Occupational History    Not on file   Tobacco Use    Smoking status: Every Day     Current packs/day: 0.50     Average packs/day: 0.8 packs/day for 31.1 years (26.0 ttl pk-yrs)     Types: Cigarettes     Start date: 1994    Smokeless tobacco: Never   Vaping Use    Vaping status: Never Used   Substance and Sexual Activity    Alcohol use: Not Currently    Drug use: Never    Sexual activity: Not Currently     Partners: Female     Birth control/protection: None       Current Outpatient Medications on File Prior to Encounter   Medication Sig    acetaminophen (TYLENOL) 500 mg tablet Take 500 mg by mouth Three times a day    aspirin (ECOTRIN LOW  STRENGTH) 81 mg EC tablet Take 81 mg by mouth daily    DULoxetine (CYMBALTA) 60 mg delayed release capsule TAKE 1 CAPSULE EVERY DAY    gabapentin (NEURONTIN) 600 MG tablet Take 1 tablet (600 mg total) by mouth 4 (four) times a day    ibuprofen (MOTRIN) 200 mg tablet Take by mouth every 6 (six) hours as needed for mild pain    Melatonin 10 MG TABS Take by mouth    methocarbamol (ROBAXIN) 500 mg tablet TAKE 1 TABLET TWICE DAILY    rosuvastatin (CRESTOR) 20 MG tablet Take 1 tablet (20 mg total) by mouth daily    traMADol (ULTRAM) 50 mg tablet Take 1 tablet (50 mg total) by mouth 3 (three) times a day as needed for moderate pain or severe pain     No current facility-administered medications on file prior to encounter.       Allergies   Allergen Reactions    Atorvastatin        Physical Exam:    /72 (BP Location: Right arm)   Pulse 65   Temp (!) 96.1 °F (35.6 °C) (Temporal)   Resp 18   SpO2 99%     Constitutional:normal, well developed, well nourished, alert, in no distress and non-toxic and no overt pain behavior.  Eyes:anicteric  HEENT:grossly intact  Neck:supple, symmetric, trachea midline and no masses   Pulmonary:even and unlabored  Cardiovascular:No edema or pitting edema present  Skin:Normal without rashes or lesions and well hydrated  Psychiatric:Mood and affect appropriate  Neurologic:Cranial Nerves II-XII grossly intact  Musculoskeletal:antalgic

## 2025-01-31 NOTE — DISCHARGE INSTR - LAB

## 2025-02-01 LAB — HCV AB SER QL: NORMAL

## 2025-02-03 NOTE — TELEPHONE ENCOUNTER
S/W pt. Pt stated needle sites look good, denies S/S of infection, denies fever, denies soreness and denies sunburn like sensation.   Pain rating today:  6/10  Advised pt if he/she does get pain to take prescribed or OTC pain medications and/or use ice/heat and it will take 4-6 weeks to take full effect. Pt verbalized understanding.  Confirmed next appt with pt.

## 2025-02-04 ENCOUNTER — OFFICE VISIT (OUTPATIENT)
Dept: FAMILY MEDICINE CLINIC | Facility: CLINIC | Age: 70
End: 2025-02-04
Payer: MEDICARE

## 2025-02-04 VITALS
TEMPERATURE: 96.8 F | HEART RATE: 45 BPM | DIASTOLIC BLOOD PRESSURE: 70 MMHG | SYSTOLIC BLOOD PRESSURE: 102 MMHG | BODY MASS INDEX: 18.49 KG/M2 | HEIGHT: 68 IN | OXYGEN SATURATION: 98 % | WEIGHT: 122 LBS

## 2025-02-04 DIAGNOSIS — R73.03 PREDIABETES: ICD-10-CM

## 2025-02-04 DIAGNOSIS — E78.2 MIXED HYPERLIPIDEMIA: ICD-10-CM

## 2025-02-04 DIAGNOSIS — F11.20 UNCOMPLICATED OPIOID DEPENDENCE (HCC): ICD-10-CM

## 2025-02-04 DIAGNOSIS — M51.16 LUMBAR DISC DISEASE WITH RADICULOPATHY: ICD-10-CM

## 2025-02-04 DIAGNOSIS — G89.29 OTHER CHRONIC PAIN: ICD-10-CM

## 2025-02-04 DIAGNOSIS — I25.10 CORONARY ARTERY DISEASE INVOLVING NATIVE CORONARY ARTERY OF NATIVE HEART WITHOUT ANGINA PECTORIS: ICD-10-CM

## 2025-02-04 DIAGNOSIS — C61 PROSTATE CANCER (HCC): ICD-10-CM

## 2025-02-04 DIAGNOSIS — Z00.00 MEDICARE ANNUAL WELLNESS VISIT, SUBSEQUENT: Primary | ICD-10-CM

## 2025-02-04 DIAGNOSIS — R73.9 ELEVATED BLOOD SUGAR: ICD-10-CM

## 2025-02-04 DIAGNOSIS — E87.5 HYPERKALEMIA: ICD-10-CM

## 2025-02-04 PROCEDURE — 99213 OFFICE O/P EST LOW 20 MIN: CPT | Performed by: INTERNAL MEDICINE

## 2025-02-04 PROCEDURE — G0439 PPPS, SUBSEQ VISIT: HCPCS | Performed by: INTERNAL MEDICINE

## 2025-02-04 RX ORDER — DULOXETIN HYDROCHLORIDE 60 MG/1
60 CAPSULE, DELAYED RELEASE ORAL DAILY
Qty: 90 CAPSULE | Refills: 3 | Status: SHIPPED | OUTPATIENT
Start: 2025-02-04

## 2025-02-04 NOTE — PATIENT INSTRUCTIONS
Medicare Preventive Visit Patient Instructions  Thank you for completing your Welcome to Medicare Visit or Medicare Annual Wellness Visit today. Your next wellness visit will be due in one year (2/5/2026).  The screening/preventive services that you may require over the next 5-10 years are detailed below. Some tests may not apply to you based off risk factors and/or age. Screening tests ordered at today's visit but not completed yet may show as past due. Also, please note that scanned in results may not display below.  Preventive Screenings:  Service Recommendations Previous Testing/Comments   Colorectal Cancer Screening  Colonoscopy    Fecal Occult Blood Test (FOBT)/Fecal Immunochemical Test (FIT)  Fecal DNA/Cologuard Test  Flexible Sigmoidoscopy Age: 45-75 years old   Colonoscopy: every 10 years (May be performed more frequently if at higher risk)  OR  FOBT/FIT: every 1 year  OR  Cologuard: every 3 years  OR  Sigmoidoscopy: every 5 years  Screening may be recommended earlier than age 45 if at higher risk for colorectal cancer. Also, an individualized decision between you and your healthcare provider will decide whether screening between the ages of 76-85 would be appropriate. Colonoscopy: Not on file  FOBT/FIT: Not on file  Cologuard: 07/29/2024  Sigmoidoscopy: Not on file    Screening Current     Prostate Cancer Screening Individualized decision between patient and health care provider in men between ages of 55-69   Medicare will cover every 12 months beginning on the day after your 50th birthday PSA: <0.008 ng/mL     History Prostate Cancer     Hepatitis C Screening Once for adults born between 1945 and 1965  More frequently in patients at high risk for Hepatitis C Hep C Antibody: 01/31/2025    Screening Current   Diabetes Screening 1-2 times per year if you're at risk for diabetes or have pre-diabetes Fasting glucose: 70 mg/dL (1/31/2025)  A1C: 5.9 % (1/31/2025)  Screening Current   Cholesterol Screening Once  every 5 years if you don't have a lipid disorder. May order more often based on risk factors. Lipid panel: 01/31/2025  Screening Not Indicated  History Lipid Disorder      Other Preventive Screenings Covered by Medicare:  Abdominal Aortic Aneurysm (AAA) Screening: covered once if your at risk. You're considered to be at risk if you have a family history of AAA or a male between the age of 65-75 who smoking at least 100 cigarettes in your lifetime.  Lung Cancer Screening: covers low dose CT scan once per year if you meet all of the following conditions: (1) Age 55-77; (2) No signs or symptoms of lung cancer; (3) Current smoker or have quit smoking within the last 15 years; (4) You have a tobacco smoking history of at least 20 pack years (packs per day x number of years you smoked); (5) You get a written order from a healthcare provider.  Glaucoma Screening: covered annually if you're considered high risk: (1) You have diabetes OR (2) Family history of glaucoma OR (3)  aged 50 and older OR (4)  American aged 65 and older  Osteoporosis Screening: covered every 2 years if you meet one of the following conditions: (1) Have a vertebral abnormality; (2) On glucocorticoid therapy for more than 3 months; (3) Have primary hyperparathyroidism; (4) On osteoporosis medications and need to assess response to drug therapy.  HIV Screening: covered annually if you're between the age of 15-65. Also covered annually if you are younger than 15 and older than 65 with risk factors for HIV infection. For pregnant patients, it is covered up to 3 times per pregnancy.    Immunizations:  Immunization Recommendations   Influenza Vaccine Annual influenza vaccination during flu season is recommended for all persons aged >= 6 months who do not have contraindications   Pneumococcal Vaccine   * Pneumococcal conjugate vaccine = PCV13 (Prevnar 13), PCV15 (Vaxneuvance), PCV20 (Prevnar 20)  * Pneumococcal polysaccharide vaccine  = PPSV23 (Pneumovax) Adults 19-65 yo with certain risk factors or if 65+ yo  If never received any pneumonia vaccine: recommend Prevnar 20 (PCV20)  Give PCV20 if previously received 1 dose of PCV13 or PPSV23   Hepatitis B Vaccine 3 dose series if at intermediate or high risk (ex: diabetes, end stage renal disease, liver disease)   Respiratory syncytial virus (RSV) Vaccine - COVERED BY MEDICARE PART D  * RSVPreF3 (Arexvy) CDC recommends that adults 60 years of age and older may receive a single dose of RSV vaccine using shared clinical decision-making (SCDM)   Tetanus (Td) Vaccine - COST NOT COVERED BY MEDICARE PART B Following completion of primary series, a booster dose should be given every 10 years to maintain immunity against tetanus. Td may also be given as tetanus wound prophylaxis.   Tdap Vaccine - COST NOT COVERED BY MEDICARE PART B Recommended at least once for all adults. For pregnant patients, recommended with each pregnancy.   Shingles Vaccine (Shingrix) - COST NOT COVERED BY MEDICARE PART B  2 shot series recommended in those 19 years and older who have or will have weakened immune systems or those 50 years and older     Health Maintenance Due:      Topic Date Due   • Lung Cancer Screening  10/11/2025   • Colorectal Cancer Screening  07/29/2027   • Hepatitis C Screening  Completed     Immunizations Due:      Topic Date Due   • Hepatitis A Vaccine (1 of 2 - Risk 2-dose series) Never done   • Influenza Vaccine (1) 09/01/2024   • COVID-19 Vaccine (3 - 2024-25 season) 09/01/2024     Advance Directives   What are advance directives?  Advance directives are legal documents that state your wishes and plans for medical care. These plans are made ahead of time in case you lose your ability to make decisions for yourself. Advance directives can apply to any medical decision, such as the treatments you want, and if you want to donate organs.   What are the types of advance directives?  There are many types of  advance directives, and each state has rules about how to use them. You may choose a combination of any of the following:  Living will:  This is a written record of the treatment you want. You can also choose which treatments you do not want, which to limit, and which to stop at a certain time. This includes surgery, medicine, IV fluid, and tube feedings.   Durable power of  for healthcare (DPAHC):  This is a written record that states who you want to make healthcare choices for you when you are unable to make them for yourself. This person, called a proxy, is usually a family member or a friend. You may choose more than 1 proxy.  Do not resuscitate (DNR) order:  A DNR order is used in case your heart stops beating or you stop breathing. It is a request not to have certain forms of treatment, such as CPR. A DNR order may be included in other types of advance directives.  Medical directive:  This covers the care that you want if you are in a coma, near death, or unable to make decisions for yourself. You can list the treatments you want for each condition. Treatment may include pain medicine, surgery, blood transfusions, dialysis, IV or tube feedings, and a ventilator (breathing machine).  Values history:  This document has questions about your views, beliefs, and how you feel and think about life. This information can help others choose the care that you would choose.  Why are advance directives important?  An advance directive helps you control your care. Although spoken wishes may be used, it is better to have your wishes written down. Spoken wishes can be misunderstood, or not followed. Treatments may be given even if you do not want them. An advance directive may make it easier for your family to make difficult choices about your care.   Fall Prevention    Fall prevention  includes ways to make your home and other areas safer. It also includes ways you can move more carefully to prevent a fall. Health  conditions that cause changes in your blood pressure, vision, or muscle strength and coordination may increase your risk for falls. Medicines may also increase your risk for falls if they make you dizzy, weak, or sleepy.   Fall prevention tips:   Stand or sit up slowly.    Use assistive devices as directed.    Wear shoes that fit well and have soles that .    Wear a personal alarm.    Stay active.    Manage your medical conditions.    Home Safety Tips:  Add items to prevent falls in the bathroom.    Keep paths clear.    Install bright lights in your home.    Keep items you use often on shelves within reach.    Paint or place reflective tape on the edges of your stairs.    Cigarette Smoking and Your Health   Risks to your health if you smoke:  Nicotine and other chemicals found in tobacco damage every cell in your body. Even if you are a light smoker, you have an increased risk for cancer, heart disease, and lung disease. If you are pregnant or have diabetes, smoking increases your risk for complications.   Benefits to your health if you stop smoking:   You decrease respiratory symptoms such as coughing, wheezing, and shortness of breath.   You reduce your risk for cancers of the lung, mouth, throat, kidney, bladder, pancreas, stomach, and cervix. If you already have cancer, you increase the benefits of chemotherapy. You also reduce your risk for cancer returning or a second cancer from developing.   You reduce your risk for heart disease, blood clots, heart attack, and stroke.   You reduce your risk for lung infections, and diseases such as pneumonia, asthma, chronic bronchitis, and emphysema.  Your circulation improves. More oxygen can be delivered to your body. If you have diabetes, you lower your risk for complications, such as kidney, artery, and eye diseases. You also lower your risk for nerve damage. Nerve damage can lead to amputations, poor vision, and blindness.  You improve your body's ability to heal  and to fight infections.  For more information and support to stop smoking:   Smokefree.gov  Phone: 5- 028 - 829-2899  Web Address: www.[employer]  Narcotic (Opioid) Safety    Use narcotics safely:  Take prescribed narcotics exactly as directed  Do not give narcotics to others or take narcotics that belong to someone else  Do not mix narcotics without medicines or alcohol  Do not drive or operate heavy machinery after you take the narcotic  Monitor for side effects and notify your healthcare provider if you experienced side effects such as nausea, sleepiness, itching, or trouble thinking clearly.    Manage constipation:    Constipation is the most common side effect of narcotic medicine. Constipation is when you have hard, dry bowel movements, or you go longer than usual between bowel movements. Tell your healthcare provider about all changes in your bowel movements while you are taking narcotics. He or she may recommend laxative medicine to help you have a bowel movement. He or she may also change the kind of narcotic you are taking, or change when you take it. The following are more ways you can prevent or relieve constipation:    Drink liquids as directed.  You may need to drink extra liquids to help soften and move your bowels. Ask how much liquid to drink each day and which liquids are best for you.  Eat high-fiber foods.  This may help decrease constipation by adding bulk to your bowel movements. High-fiber foods include fruits, vegetables, whole-grain breads and cereals, and beans. Your healthcare provider or dietitian can help you create a high-fiber meal plan. Your provider may also recommend a fiber supplement if you cannot get enough fiber from food.  Exercise regularly.  Regular physical activity can help stimulate your intestines. Walking is a good exercise to prevent or relieve constipation. Ask which exercises are best for you.  Schedule a time each day to have a bowel movement.  This may help  train your body to have regular bowel movements. Bend forward while you are on the toilet to help move the bowel movement out. Sit on the toilet for at least 10 minutes, even if you do not have a bowel movement.    Store narcotics safely:   Store narcotics where others cannot easily get them.  Keep them in a locked cabinet or secure area. Do not  keep them in a purse or other bag you carry with you. A person may be looking for something else and find the narcotics.  Make sure narcotics are stored out of the reach of children.  A child can easily overdose on narcotics. Narcotics may look like candy to a small child.    The best way to dispose of narcotics:      The laws vary by country and area. In the [place], the best way is to return the narcotics through a take-back program. This program is offered by the [place] Drug Enforcement Agency (WANG). The following are options for using the program:  Take the narcotics to a WANG collection site.  The site is often a law enforcement center. Call your local law enforcement center for scheduled take-back days in your area. You will be given information on where to go if the collection site is in a different location.  Take the narcotics to an approved pharmacy or hospital.  A pharmacy or hospital may be set up as a collection site. You will need to ask if it is a WANG collection site if you were not directed there. A pharmacy or doctor's office may not be able to take back narcotics unless it is a WANG site.  Use a mail-back system.  This means you are given containers to put the narcotics into. You will then mail them in the containers.  Use a take-back drop box.  This is a place to leave the narcotics at any time. People and animals will not be able to get into the box. Your local law enforcement agency can tell you where to find a drop box in your area.    Other ways to manage pain:   Ask your healthcare provider about non-narcotic medicines to control pain.   Nonprescription medicines include NSAIDs (such as ibuprofen) and acetaminophen. Prescription medicines include muscle relaxers, antidepressants, and steroids.  Pain may be managed without any medicines.  Some ways to relieve pain include massage, aromatherapy, or meditation. Physical or occupational therapy may also help.    For more information:   Drug Enforcement Administration  [address] [place] [address]  Phone: 5- 871 - 029-7417  Web Address: https://www.deadiversion.[facility].gov/drug_disposal/     Food and Drug Administration  [address] [place] [address]  Phone: 8- 974 - 329-4996  Web Address: http://www.fda.gov     © Copyright [employer] 2018 Information is for End User's use only and may not be sold, redistributed or otherwise used for commercial purposes. All illustrations and images included in CareNotes® are the copyrighted property of [employer] or [employer]

## 2025-02-04 NOTE — PROGRESS NOTES
Name: Arslan Sanderson      : 1955      MRN: [id number]  Encounter Provider: Jose Ansari DO  Encounter Date: 2025   Encounter department: [facility]    Assessment & Plan  Medicare annual wellness visit, subsequent  Reviewed the AWV questionnaire.  Went through standard need for appropriate screening tests based on risk factors..  Reviewed cognitive issues.  Reviewed living will and DURABLE POWER OF .  Discussed healthy lifestyle, healthy diet.  Reviewed vaccines.  Encourage exercise.  Discussed safety issues within the home and about.        Hyperkalemia  Labs today revealing significant elevation in his potassium of uncertain reason.  On no ACE inhibitor's or ARB's at this point.  Will recheck in a week.  Remains elevated may need to look into secondary causes including hormonal.  Orders:  •  Basic metabolic panel; Future  •  Comprehensive metabolic panel; Future    Lumbar disc disease with radiculopathy  Continue to follow with pain management.  Recent rhizotomies noted.  Orders:  •  CBC and differential; Future    Other chronic pain    Orders:  •  DULoxetine (CYMBALTA) 60 mg delayed release capsule; Take 1 capsule (60 mg total) by mouth daily    Coronary artery disease involving native coronary artery of native heart without angina pectoris  Follow-up with cardiology in the near future.  Remains on moderate intensity statin and aspirin a day.  Orders:  •  CBC and differential; Future    Uncomplicated opioid dependence (HCC)  Continues on tramadol as needed.       Prostate cancer (HCC)  PSA remains undetectable.  Recheck yearly.       Elevated blood sugar  Continue to monitor hemoglobin A1c.       Prediabetes  Continue to monitor hemoglobin A1c  Orders:  •  CBC and differential; Future  •  Comprehensive metabolic panel; Future  •  Hemoglobin A1C; Future    Mixed hyperlipidemia    Orders:  •  Lipid Panel with Direct LDL reflex; Future      Depression Screening and  Follow-up Plan: Patient was screened for depression during today's encounter. They screened negative with a PHQ-2 score of 1.    Tobacco Cessation Counseling: Tobacco cessation counseling was provided. The patient is sincerely urged to quit consumption of tobacco. He is not ready to quit tobacco. Medication options and side effects of medication discussed. Patient refused medication.       Preventive health issues were discussed with patient, and age appropriate screening tests were ordered as noted in patient's After Visit Summary. Personalized health advice and appropriate referrals for health education or preventive services given if needed, as noted in patient's After Visit Summary.    History of Present Illness     Patient presents for their annual medical wellness visit.  Reviewed medical intake questionnaire.  Discussed living will and DURABLE POWER OF .  Discussed importance of these 2 documents.  Reviewed the various screening modalities the patient was due for.  Reviewed home safety as well as depression and risk of falling.  Through shared medical decision making move forward with testing or blood work as ordered.  The patient presents with his usual chronic back pain.  He has no chest pain, no shortness of breath.  Denies nausea vomiting or diarrhea.  Bowels and bladder have otherwise been normal.  Recently had rhizotomies on 1 side will be going back for a rhizotomies on the opposite side of his lumbar spine.    He has follow-up with cardiology in the near future.  No signs or symptoms of coronary insufficiency.  Denies claudication..        Patient Care Team:  Jose Ansari DO as PCP - General (Internal Medicine)    Review of Systems   Constitutional:  Negative for chills and fever.   HENT:  Negative for ear pain and sore throat.    Eyes:  Negative for pain and visual disturbance.   Respiratory:  Negative for cough and shortness of breath.    Cardiovascular:  Negative for chest pain and  palpitations.   Gastrointestinal:  Negative for abdominal pain and vomiting.   Genitourinary:  Negative for dysuria and hematuria.   Musculoskeletal:  Positive for arthralgias and back pain.   Skin:  Negative for color change and rash.   Neurological:  Negative for seizures and syncope.   All other systems reviewed and are negative.    Medical History Reviewed by provider this encounter:       Annual Wellness Visit Questionnaire   Arslan is here for his Subsequent Wellness visit. Last Medicare Wellness visit information reviewed, patient interviewed, no change since last AWV.     Health Risk Assessment:   Patient rates overall health as good. Patient feels that their physical health rating is same. Patient is satisfied with their life. Eyesight was rated as same. Hearing was rated as same. Patient feels that their emotional and mental health rating is same. Patients states they are never, rarely angry. Patient states they are sometimes unusually tired/fatigued. Pain experienced in the last 7 days has been a lot. Patient's pain rating has been 7/10. Patient states that he has experienced no weight loss or gain in last 6 months.     Depression Screening:   PHQ-2 Score: 1      Fall Risk Screening:   In the past year, patient has experienced: history of falling in past year    Number of falls: 2 or more  Injured during fall?: No    Feels unsteady when standing or walking?: No    Worried about falling?: No      Home Safety:  Patient does not have trouble with stairs inside or outside of their home. Patient has working smoke alarms and has working carbon monoxide detector. Home safety hazards include: none.     Nutrition:   Current diet is Regular.     Medications:   Patient is not currently taking any over-the-counter supplements. Patient is able to manage medications.     Activities of Daily Living (ADLs)/Instrumental Activities of Daily Living (IADLs):   Walk and transfer into and out of bed and chair?: Yes  Dress and  groom yourself?: Yes    Bathe or shower yourself?: Yes    Feed yourself? Yes  Do your laundry/housekeeping?: Yes  Manage your money, pay your bills and track your expenses?: Yes  Make your own meals?: Yes    Do your own shopping?: Yes    Durable Medical Equipment Suppliers  none    Previous Hospitalizations:   Any hospitalizations or ED visits within the last 12 months?: No      Advance Care Planning:   Living will: No    Durable POA for healthcare: No    Advanced directive: Yes    Advanced directive counseling given: Yes    ACP document given: Yes    Patient declined ACP directive: No    End of Life Decisions reviewed with patient: Yes    Provider agrees with end of life decisions: Yes      Cognitive Screening:   Provider or family/friend/caregiver concerned regarding cognition?: No    PREVENTIVE SCREENINGS      Cardiovascular Screening:    General: Screening Not Indicated and History Lipid Disorder      Diabetes Screening:     General: Screening Current      Colorectal Cancer Screening:     General: Screening Current      Prostate Cancer Screening:    General: History Prostate Cancer      Osteoporosis Screening:    General: Screening Not Indicated      Abdominal Aortic Aneurysm (AAA) Screening:    Risk factors include: age between 65-74 yo and tobacco use        General: Screening Current      Lung Cancer Screening:     General: Screening Current      Hepatitis C Screening:    General: Screening Current    Screening, Brief Intervention, and Referral to Treatment (SBIRT)    Screening  Typical number of drinks in a day: 0  Typical number of drinks in a week: 0  Interpretation: Low risk drinking behavior.    AUDIT-C Screenin) How often did you have a drink containing alcohol in the past year? never  2) How many drinks did you have on a typical day when you were drinking in the past year? 0  3) How often did you have 6 or more drinks on one occasion in the past year? never    AUDIT-C Score: 0  Interpretation:  "Score 0-3 (male): Negative screen for alcohol misuse    Single Item Drug Screening:  How often have you used an illegal drug (including marijuana) or a prescription medication for non-medical reasons in the past year? never    Single Item Drug Screen Score: 0  Interpretation: Negative screen for possible drug use disorder    Review of Current Opioid Use    Opioid Risk Tool (ORT) Interpretation: Complete Opioid Risk Tool (ORT)    Other Counseling Topics:   Car/seat belt/driving safety, sunscreen and regular weightbearing exercise.     Social Drivers of Health     Financial Resource Strain: Low Risk  (1/12/2024)    Overall Financial Resource Strain (CARDIA)    • Difficulty of Paying Living Expenses: Not very hard   Food Insecurity: No Food Insecurity (1/30/2025)    Hunger Vital Sign    • Worried About Running Out of Food in the Last Year: Never true    • Ran Out of Food in the Last Year: Never true   Transportation Needs: No Transportation Needs (1/30/2025)    PRAPARE - Transportation    • Lack of Transportation (Medical): No    • Lack of Transportation (Non-Medical): No   Housing Stability: Low Risk  (1/30/2025)    Housing Stability Vital Sign    • Unable to Pay for Housing in the Last Year: No    • Number of Times Moved in the Last Year: 0    • Homeless in the Last Year: No   Utilities: Not At Risk (1/30/2025)    [facility]    • Threatened with loss of utilities: No     No results found.    Objective   /70 (BP Location: Left arm, Patient Position: Sitting, Cuff Size: Standard)   Pulse (!) 45   Temp (!) 96.8 °F (36 °C) (Tympanic)   Ht 5' 8\" (1.727 m)   Wt 55.3 kg (122 lb)   SpO2 98%   BMI 18.55 kg/m²     Physical Exam  Vitals and nursing note reviewed.   Constitutional:       General: He is not in acute distress.     Appearance: Normal appearance. He is well-developed.   HENT:      Head: Normocephalic and atraumatic.   Eyes:      Conjunctiva/sclera: Conjunctivae normal.   Cardiovascular:      Rate and " Rhythm: Normal rate and regular rhythm.      Pulses: Normal pulses.      Heart sounds: Normal heart sounds. No murmur heard.  Pulmonary:      Effort: Pulmonary effort is normal. No respiratory distress.      Breath sounds: Normal breath sounds.   Abdominal:      Palpations: Abdomen is soft.      Tenderness: There is no abdominal tenderness.   Musculoskeletal:         General: No swelling.      Cervical back: Neck supple.   Skin:     General: Skin is warm and dry.      Capillary Refill: Capillary refill takes less than 2 seconds.   Neurological:      General: No focal deficit present.      Mental Status: He is alert and oriented to person, place, and time.   Psychiatric:         Mood and Affect: Mood normal.

## 2025-02-04 NOTE — ASSESSMENT & PLAN NOTE
Continue to follow with pain management.  Recent rhizotomies noted.  Orders:  •  CBC and differential; Future

## 2025-02-04 NOTE — ASSESSMENT & PLAN NOTE
Follow-up with cardiology in the near future.  Remains on moderate intensity statin and aspirin a day.  Orders:  •  CBC and differential; Future

## 2025-02-04 NOTE — ASSESSMENT & PLAN NOTE
Reviewed the AWV questionnaire.  Went through standard need for appropriate screening tests based on risk factors..  Reviewed cognitive issues.  Reviewed living will and DURABLE POWER OF .  Discussed healthy lifestyle, healthy diet.  Reviewed vaccines.  Encourage exercise.  Discussed safety issues within the home and about.

## 2025-02-04 NOTE — ASSESSMENT & PLAN NOTE
Continue to monitor hemoglobin A1c  Orders:  •  CBC and differential; Future  •  Comprehensive metabolic panel; Future  •  Hemoglobin A1C; Future

## 2025-02-04 NOTE — ASSESSMENT & PLAN NOTE
>>ASSESSMENT AND PLAN FOR MIXED HYPERLIPIDEMIA WRITTEN ON 2/4/2025 11:34 AM BY DIANNA TONG DO      Orders:  •  Lipid Panel with Direct LDL reflex; Future

## 2025-02-06 ENCOUNTER — OFFICE VISIT (OUTPATIENT)
Dept: CARDIOLOGY CLINIC | Facility: CLINIC | Age: 70
End: 2025-02-06
Payer: MEDICARE

## 2025-02-06 VITALS
BODY MASS INDEX: 18.49 KG/M2 | DIASTOLIC BLOOD PRESSURE: 70 MMHG | HEART RATE: 66 BPM | WEIGHT: 122 LBS | HEIGHT: 68 IN | SYSTOLIC BLOOD PRESSURE: 120 MMHG

## 2025-02-06 DIAGNOSIS — I25.10 CORONARY ARTERY DISEASE INVOLVING NATIVE CORONARY ARTERY OF NATIVE HEART WITHOUT ANGINA PECTORIS: Primary | ICD-10-CM

## 2025-02-06 DIAGNOSIS — Z72.0 TOBACCO USE: ICD-10-CM

## 2025-02-06 DIAGNOSIS — E78.5 DYSLIPIDEMIA: ICD-10-CM

## 2025-02-06 DIAGNOSIS — E78.2 MIXED HYPERLIPIDEMIA: ICD-10-CM

## 2025-02-06 PROCEDURE — 93000 ELECTROCARDIOGRAM COMPLETE: CPT | Performed by: INTERNAL MEDICINE

## 2025-02-06 PROCEDURE — 99214 OFFICE O/P EST MOD 30 MIN: CPT | Performed by: INTERNAL MEDICINE

## 2025-02-06 RX ORDER — ROSUVASTATIN CALCIUM 20 MG/1
20 TABLET, COATED ORAL DAILY
Qty: 90 TABLET | Refills: 5 | Status: SHIPPED | OUTPATIENT
Start: 2025-02-06

## 2025-02-06 NOTE — PROGRESS NOTES
Patient ID: Arslan Sanderson is a 69 y.o. male.        Plan:      Assessment & Plan  Coronary artery disease involving native coronary artery of native heart without angina pectoris  2006 with single vessel CABG (LIMA to Diag). No symptoms.  Mixed hyperlipidemia    Dyslipidemia  Tolerating high-intensity statin therapy and will continue current regimen.  LDL well controlled.  Tobacco use  1 pack a day since teenage years.  He is not willing to stop at this point.  He gets routine CT screening with Dr. Ansari.      Follow up Plan/Other summary comments:  Return in about 1 year (around 2/6/2026).    HPI: Patient is seen in follow-up today regarding the above.  Since last visit he has felt well.  He continues to be reasonably active.  No change in exertional tolerance.  No chest pain shortness of breath syncope or near syncope.       To reiterate the patient has had prior single vessel CABG with left internal mammary to the diagonal on 07/10/2006.  He has had no chest pain or chest pressure since.  Results for orders placed or performed in visit on 02/06/25   POCT ECG    Impression    NSR. WNL.         Most recent or relevant cardiac/vascular testing:    Lexiscan Myoview 10/27/2020: Normal.       Past Surgical History:   Procedure Laterality Date    COLONOSCOPY      CORONARY ARTERY BYPASS GRAFT  07/10/2006    LIMA to the diagonal artery    INGUINAL HERNIA REPAIR Right 1996    LAMINECTOMY  1/2020    RI LAPS SURG [id number] RPBIC RAD W/NRV SPARING ROBOT N/A 04/22/2019    Procedure: PROSTATECTOMY RADICAL W/ROBOTICS, B/L PELVIC NODE DISSECT;  Surgeon: Taran Adam MD;  Location: [facility];  Service: Urology    SKIN CANCER EXCISION  2016    left side nose    SPINE SURGERY  1/2020    THORACIC SPINE SURGERY      T3-T4, removal of an arachnoid cyst.    US GUIDED PROSTATE BIOPSY  02/28/2019       Lipid Profile: Reviewed      Review of Systems   10  point ROS  was otherwise non pertinent or negative except as per HPI or as below.  "  Gait:  Normal.        Objective:     /70   Pulse 66   Ht 5' 8\" (1.727 m)   Wt 55.3 kg (122 lb)   BMI 18.55 kg/m²     PHYSICAL EXAM:    General:  Normal appearance in no distress.  Eyes:  Anicteric.  Oral mucosa:  Moist.  Neck:  No JVD. Carotid upstrokes are brisk without bruits.  No masses.  Chest:  Clear to auscultation.  Well-healed midline sternotomy scar.  Cardiac:  No palpable PMI.  Normal S1 and S2.  No murmur gallop or rub.  Abdomen:  Soft and nontender. No palpable organomegaly or aortic enlargement.  Extremities:  No peripheral edema.  Musculoskeletal:  Symmetric.   Vascular:  Femoral pulses are brisk without bruits.  Popliteal pulses are intact bilaterally.   Pedal pulses are intact.  Neuro:  Grossly symmetric.  Psych:  Alert and oriented x3.  Pulses are symmetric throughout including both arms.    Meds reviewed.    Past Medical History:   Diagnosis Date    BCC (basal cell carcinoma)     in past left side of nose    Cancer (HCC) 1/2019    Chronic pain disorder     low back and down rle    Coronary artery disease     cabg x 1 2006    Dyslipidemia     Hyperlipidemia     Idiopathic hypotension     Malignant neoplasm prostate (HCC)            Social History     Tobacco Use   Smoking Status Every Day    Current packs/day: 0.50    Average packs/day: 0.8 packs/day for 31.1 years (26.0 ttl pk-yrs)    Types: Cigarettes    Start date: 1994   Smokeless Tobacco Never             "

## 2025-02-06 NOTE — ASSESSMENT & PLAN NOTE
1 pack a day since teenage years.  He is not willing to stop at this point.  He gets routine CT screening with Dr. Ansari.

## 2025-02-14 ENCOUNTER — TELEPHONE (OUTPATIENT)
Dept: PAIN MEDICINE | Facility: CLINIC | Age: 70
End: 2025-02-14

## 2025-02-14 NOTE — TELEPHONE ENCOUNTER
Spoke with Arslan to inform him that Judy will contact him by Monday 2/17/25 to r/s his Injection with Dr. Price. Patient also stated that he didn't have a ride today and is understanding to a later date for rescheduling. I also checked his Fl[facility] and the time patient was instructed to arrive is correct with Dr. Mcdonald schedule today 2/14/25.

## 2025-02-14 NOTE — TELEPHONE ENCOUNTER
Called pt as he has not arrived for his appt.  Pt had a different time written down. He asked to be called to reschedule

## 2025-02-19 ENCOUNTER — TELEPHONE (OUTPATIENT)
Dept: RADIOLOGY | Facility: HOSPITAL | Age: 70
End: 2025-02-19

## 2025-02-19 NOTE — TELEPHONE ENCOUNTER
Called pt and lmom to confirm appt 2/21 with RM at . Lm if pt needs to r/s or cancel to call us back

## 2025-02-21 ENCOUNTER — TELEPHONE (OUTPATIENT)
Dept: RADIOLOGY | Facility: HOSPITAL | Age: 70
End: 2025-02-21

## 2025-02-21 ENCOUNTER — HOSPITAL ENCOUNTER (OUTPATIENT)
Dept: RADIOLOGY | Facility: HOSPITAL | Age: 70
End: 2025-02-21
Payer: MEDICARE

## 2025-02-21 VITALS
OXYGEN SATURATION: 98 % | HEART RATE: 74 BPM | SYSTOLIC BLOOD PRESSURE: 105 MMHG | RESPIRATION RATE: 18 BRPM | DIASTOLIC BLOOD PRESSURE: 65 MMHG | TEMPERATURE: 97.1 F

## 2025-02-21 DIAGNOSIS — M47.816 LUMBAR SPONDYLOSIS: ICD-10-CM

## 2025-02-21 RX ORDER — LIDOCAINE HYDROCHLORIDE 10 MG/ML
5 INJECTION, SOLUTION EPIDURAL; INFILTRATION; INTRACAUDAL; PERINEURAL ONCE
Status: COMPLETED | OUTPATIENT
Start: 2025-02-21 | End: 2025-02-21

## 2025-02-21 RX ADMIN — LIDOCAINE HYDROCHLORIDE 5 ML: 10 INJECTION, SOLUTION EPIDURAL; INFILTRATION; INTRACAUDAL; PERINEURAL at 10:27

## 2025-02-21 RX ADMIN — Medication 4 ML: at 10:32

## 2025-02-21 NOTE — H&P
Assessment:  1. Lumbar spondylosis  FL spine and pain procedure    FL spine and pain procedure          Plan:  Arslan Sanderson is a 69 y.o. male with complaints of low back pain presents to surgical center for procedure.  We will perform a left L4-5 and L5-S1 Refixia ablation  2. Follow-up 1 month after injection    Complete risks and benefits including bleeding, infection, tissue reaction, nerve injury and allergic reaction were discussed. The approach was demonstrated using models and literature was provided. Verbal and written consent was obtained.    My impressions and treatment recommendations were discussed in detail with the patient who verbalized understanding and had no further questions.  Discharge instructions were provided. I personally saw and examined the patient and I agree with the above discussed plan of care.    Orders Placed This Encounter   Procedures    FL spine and pain procedure     Standing Status:   Standing     Number of Occurrences:   1     Reason for Exam::   LEFT L4-5 AND L5-S1 RFA     Anticoagulant hold needed?:   NO     No orders of the defined types were placed in this encounter.      History of Present Illness:  Arslan Sanderson is a 69 y.o. male who presents for a follow up office visit in regards to low back pain.   The patient’s current symptoms include intermittent sharp stabbing throbbing without particular time pattern.      I have personally reviewed and/or updated the patient's past medical history, past surgical history, family history, social history, current medications, allergies, and vital signs today.     Review of Systems   Musculoskeletal:  Positive for arthralgias and back pain.   All other systems reviewed and are negative.      Patient Active Problem List   Diagnosis    Coronary artery disease involving native coronary artery of native heart without angina pectoris    Dyslipidemia    Dural tear    Long-term current use of opiate analgesic    Uncomplicated opioid dependence  (HCC)    Chronic pain disorder    Lumbar disc disease with radiculopathy    Lumbar spondylosis    Elevated blood sugar    Prostate cancer (HCC)    Medicare annual wellness visit, subsequent    Prediabetes    Tobacco use       Past Medical History:   Diagnosis Date    BCC (basal cell carcinoma)     in past left side of nose    Cancer (HCC) 1/2019    Chronic pain disorder     low back and down rle    Coronary artery disease     cabg x 1 2006    Dyslipidemia     Hyperlipidemia     Idiopathic hypotension     Malignant neoplasm prostate (HCC)        Past Surgical History:   Procedure Laterality Date    COLONOSCOPY      CORONARY ARTERY BYPASS GRAFT  07/10/2006    LIMA to the diagonal artery    INGUINAL HERNIA REPAIR Right 1996    LAMINECTOMY  1/2020    DE LAPS SURG [id number] RPBIC RAD W/NRV SPARING ROBOT N/A 04/22/2019    Procedure: PROSTATECTOMY RADICAL W/ROBOTICS, B/L PELVIC NODE DISSECT;  Surgeon: Taran Adam MD;  Location: [facility];  Service: Urology    SKIN CANCER EXCISION  2016    left side nose    SPINE SURGERY  1/2020    THORACIC SPINE SURGERY      T3-T4, removal of an arachnoid cyst.    US GUIDED PROSTATE BIOPSY  02/28/2019       Family History   Problem Relation Age of Onset    Cancer Father         bladder    Lung cancer Father         metastatic       Social History     Occupational History    Not on file   Tobacco Use    Smoking status: Every Day     Current packs/day: 0.50     Average packs/day: 0.8 packs/day for 31.1 years (26.1 ttl pk-yrs)     Types: Cigarettes     Start date: 1994    Smokeless tobacco: Never   Vaping Use    Vaping status: Never Used   Substance and Sexual Activity    Alcohol use: Not Currently    Drug use: Never    Sexual activity: Not Currently     Partners: Female     Birth control/protection: None       Current Outpatient Medications on File Prior to Encounter   Medication Sig    acetaminophen (TYLENOL) 500 mg tablet Take 500 mg by mouth Three times a day    aspirin (ECOTRIN LOW  STRENGTH) 81 mg EC tablet Take 81 mg by mouth daily    DULoxetine (CYMBALTA) 60 mg delayed release capsule Take 1 capsule (60 mg total) by mouth daily    gabapentin (NEURONTIN) 600 MG tablet Take 1 tablet (600 mg total) by mouth 4 (four) times a day    ibuprofen (MOTRIN) 200 mg tablet Take by mouth every 6 (six) hours as needed for mild pain    Melatonin 10 MG TABS Take by mouth    methocarbamol (ROBAXIN) 500 mg tablet TAKE 1 TABLET TWICE DAILY    rosuvastatin (CRESTOR) 20 MG tablet Take 1 tablet (20 mg total) by mouth daily    traMADol (ULTRAM) 50 mg tablet Take 1 tablet (50 mg total) by mouth 3 (three) times a day as needed for moderate pain or severe pain     No current facility-administered medications on file prior to encounter.       Allergies   Allergen Reactions    Atorvastatin        Physical Exam:    /65   Pulse 58   Temp (!) 97.1 °F (36.2 °C) (Temporal)   Resp 18   SpO2 95%     Constitutional:normal, well developed, well nourished, alert, in no distress and non-toxic and no overt pain behavior.  Eyes:anicteric  HEENT:grossly intact  Neck:supple, symmetric, trachea midline and no masses   Pulmonary:even and unlabored  Cardiovascular:No edema or pitting edema present  Skin:Normal without rashes or lesions and well hydrated  Psychiatric:Mood and affect appropriate  Neurologic:Cranial Nerves II-XII grossly intact  Musculoskeletal:normal

## 2025-02-21 NOTE — DISCHARGE INSTR - LAB

## 2025-02-24 NOTE — TELEPHONE ENCOUNTER
CHILO..    S/w pt s/p Lt L4-S1 RFA on 2/21/25 RM @ . Pt stated needle sites look good, denies S/S of infect, denies fever, (+) soreness and denies sun burn like sensation. Pt states current pain 5/10 aching midline lb. Pt states he has been able to sleep better at night post proc. Advised pt if they have any further pain to take OTC/prescribed meds and/or use ice/heat and that it can take 4-6wks to see full effect. Confirmed nxt appt w/ pt 3/13/25 @ 11am w/ BK. Pt verbalized understanding and apprec of call.

## 2025-03-06 PROBLEM — Z00.00 MEDICARE ANNUAL WELLNESS VISIT, SUBSEQUENT: Status: RESOLVED | Noted: 2024-01-18 | Resolved: 2025-03-06

## 2025-03-13 ENCOUNTER — TELEPHONE (OUTPATIENT)
Age: 70
End: 2025-03-13

## 2025-03-13 ENCOUNTER — OFFICE VISIT (OUTPATIENT)
Age: 70
End: 2025-03-13
Payer: MEDICARE

## 2025-03-13 VITALS — HEIGHT: 68 IN | BODY MASS INDEX: 18.49 KG/M2 | WEIGHT: 122 LBS

## 2025-03-13 DIAGNOSIS — M47.816 LUMBAR SPONDYLOSIS: Primary | ICD-10-CM

## 2025-03-13 DIAGNOSIS — F11.20 UNCOMPLICATED OPIOID DEPENDENCE (HCC): ICD-10-CM

## 2025-03-13 DIAGNOSIS — M54.9 MID BACK PAIN: ICD-10-CM

## 2025-03-13 DIAGNOSIS — Z98.890 HISTORY OF LUMBAR SURGERY: ICD-10-CM

## 2025-03-13 DIAGNOSIS — Z79.891 LONG-TERM CURRENT USE OF OPIATE ANALGESIC: ICD-10-CM

## 2025-03-13 DIAGNOSIS — M51.16 LUMBAR DISC DISEASE WITH RADICULOPATHY: ICD-10-CM

## 2025-03-13 PROCEDURE — G2211 COMPLEX E/M VISIT ADD ON: HCPCS | Performed by: NURSE PRACTITIONER

## 2025-03-13 PROCEDURE — 99214 OFFICE O/P EST MOD 30 MIN: CPT | Performed by: NURSE PRACTITIONER

## 2025-03-13 RX ORDER — METHOCARBAMOL 500 MG/1
500 TABLET, FILM COATED ORAL 2 TIMES DAILY
Qty: 180 TABLET | Refills: 3 | Status: SHIPPED | OUTPATIENT
Start: 2025-03-13

## 2025-03-13 RX ORDER — TRAMADOL HYDROCHLORIDE 50 MG/1
50 TABLET ORAL 3 TIMES DAILY PRN
Qty: 90 TABLET | Refills: 2 | Status: CANCELLED | OUTPATIENT
Start: 2025-03-13 | End: 2025-06-11

## 2025-03-13 RX ORDER — GABAPENTIN 600 MG/1
600 TABLET ORAL 4 TIMES DAILY
Qty: 360 TABLET | Refills: 0 | Status: SHIPPED | OUTPATIENT
Start: 2025-03-13 | End: 2025-06-11

## 2025-03-13 RX ORDER — TRAMADOL HYDROCHLORIDE 50 MG/1
50 TABLET ORAL 3 TIMES DAILY PRN
Qty: 90 TABLET | Refills: 2 | Status: SHIPPED | OUTPATIENT
Start: 2025-03-13 | End: 2025-06-11

## 2025-03-13 NOTE — TELEPHONE ENCOUNTER
PA for (ROBAXIN) 500 mg table  SUBMITTED to [facility]    via    []CMM-KEY:    [x]Surescripts-Case ID #   Case ID: [id number]     []Availity-Auth ID #  NDC #    []Faxed to plan   []Other website    []Phone call Case ID #      [x]PA sent as URGENT    All office notes, labs and other pertaining documents and studies sent. Clinical questions answered. Awaiting determination from insurance company.     Turnaround time for your insurance to make a decision on your Prior Authorization can take 7-21 business days.

## 2025-03-13 NOTE — PROGRESS NOTES
Assessment:  1. Lumbar spondylosis    2. Lumbar disc disease with radiculopathy    3. History of lumbar surgery    4. Mid back pain    5. Long-term current use of opiate analgesic    6. Uncomplicated opioid dependence (HCC)        Plan:  While the patient was in the office today, I did have a thorough conversation regarding their chronic pain syndrome, medication management, and treatment plan options.  Patient is being seen for a follow-up visit.  He underwent bilateral L4-5 and L5-S1 radiofrequency ablations.  The right side was performed on 1/3/2025.  The left side was just done a couple weeks ago on 2/21/2025.  So far, patient is reporting 40% improvement in his low back pain.  His biggest complaint today is worsening pain radiating down the posterior right leg.    Patient previously participated in physical therapy for low back pain from 6/24/2021 until 8/21/2021.  He has continued to do the exercises at home about 3 times a week since completing physical therapy.    At this point, we will update a new MRI of his lumbar spine to rule out worsening intraspinal pathology that would contribute to his current symptoms.    Renewed tramadol 50 mg 3 times daily if needed for pain.  A prescription was sent to his pharmacy with 2 refills.    Continue gabapentin 100 mg 4 times daily.  A 90-day supply of this medication was sent to his pharmacy.    Continue Robaxin 500 mg twice daily if needed for spasms.  A 90-day supply of this medication was sent to his pharmacy.    Continue Cymbalta 60 mg daily as prescribed by his PCP.    There are risks associated with opioid medications, including dependence, addiction and tolerance. The patient understands and agrees to use these medications only as prescribed. Potential side effects of the medications include, but are not limited to, constipation, drowsiness, addiction, impaired judgment and risk of fatal overdose if not taken as prescribed. The patient was warned against driving  while taking sedation medications.  Sharing medications is a felony. At this point in time, the patient is showing no signs of addiction, abuse, diversion or suicidal ideation.    A urine drug screen was collected at today's office visit as part of our medication management protocol. The point of care testing results were appropriate for what was being prescribed. The specimen will be sent for confirmatory testing. The drug screen is medically necessary because the patient is either dependent on opioid medication or is being considered for opioid medication therapy and the results could impact ongoing or future treatment. The drug screen is to evaluate for the presences or absence of prescribed, non-prescribed, and/or illicit drugs/substances.    [place] Prescription Drug Monitoring Program report was reviewed and was appropriate     Follow-up in 1 month to review results of MRI.    My impressions and treatment recommendations were discussed in detail with the patient who verbalized understanding and had no further questions.  Discharge instructions were provided. I personally saw and examined the patient and I agree with the above discussed plan of care.    Orders Placed This Encounter   Procedures    MRI lumbar spine without contrast     Standing Status:   Future     Expected Date:   3/13/2025     Expiration Date:   3/13/2029     Scheduling Instructions:      There is no preparation for this test. Please leave your jewelry and valuables at home, wedding rings are the exception. All patients will be required to change into a hospital gown and pants.  Street clothes are not permitted in the MRI.  Magnetic nail polish must be removed prior to arrival for your test. Please bring your insurance cards, a form of photo ID and a list of your medications with you. Arrive 15 minutes prior to your appointment time in order to register. Please bring any prior CT or MRI studies of this area that were not performed at a  [facility].            To schedule this appointment, please contact Central Scheduling at [phone].            Prior to your appointment, please make sure you complete the MRI Screening Form when you e-Check in for your appointment. This will be available starting 7 days before your appointment in [facility]. You may receive an e-mail with an activation code if you do not have a [employer] account. If you do not have access to a device, we will complete your screening at your appointment.     Reason for Exam:   increasing pain posterior right lower extremity     What is the patient's sedation requirement?:   No Sedation     Does the patient need medication for Claustrophobia? If yes, order medication at this point.:   No     Does the patient wear a life vest, have an implanted cardiac device, a stimulation device, a sleep apnea stimulator, or a breast tissue expansion device?:   Unknown              H/O CABG     Release to patient through [employer]:   Immediate     New Medications Ordered This Visit   Medications    methocarbamol (ROBAXIN) 500 mg tablet     Sig: Take 1 tablet (500 mg total) by mouth 2 (two) times a day     Dispense:  180 tablet     Refill:  3    gabapentin (NEURONTIN) 600 MG tablet     Sig: Take 1 tablet (600 mg total) by mouth 4 (four) times a day     Dispense:  360 tablet     Refill:  0    traMADol (ULTRAM) 50 mg tablet     Sig: Take 1 tablet (50 mg total) by mouth 3 (three) times a day as needed for moderate pain or severe pain     Dispense:  90 tablet     Refill:  2       History of Present Illness:  Arslan Sanderson is a 69 y.o. male who presents for a follow up office visit in regards to Back Pain (Follow up on back pain/PT has leg pain - UDS needed ).   The patient’s current symptoms include complaints of low back pain which has improved by about 40% following recent bilateral L4-5 and L5-S1 radiofrequency ablations.  His biggest complaint today is pain radiating down the posterior right  leg.  Current pain level is a 7/10.  Quality pain is described as burning, dull, aching, numb, pins-and-needles.  Current medications for pain include tramadol 50 mg 3 times daily if needed for pain, gabapentin 100 mg 4 times daily, Robaxin 500 mg twice daily if needed for spasms.  PCP prescribes Cymbalta 60 mg daily.  Medications can reduce his pain by 30 to 40%.  He denies side effects from medications.        I have personally reviewed and/or updated the patient's past medical history, past surgical history, family history, social history, current medications, allergies, and vital signs today.     Review of Systems   Musculoskeletal:  Positive for gait problem.        - Difficulty walking  - Muscle weakness  - Pain in extremity; R Leg   All other systems reviewed and are negative.      Patient Active Problem List   Diagnosis    Coronary artery disease involving native coronary artery of native heart without angina pectoris    Dyslipidemia    Dural tear    Long-term current use of opiate analgesic    Uncomplicated opioid dependence (HCC)    Chronic pain disorder    Lumbar disc disease with radiculopathy    Lumbar spondylosis    Elevated blood sugar    Prostate cancer (HCC)    Prediabetes    Tobacco use    History of lumbar surgery       Past Medical History:   Diagnosis Date    BCC (basal cell carcinoma)     in past left side of nose    Cancer (HCC) 1/2019    Chronic pain disorder     low back and down rle    Coronary artery disease     cabg x 1 2006    Dyslipidemia     Hyperlipidemia     Idiopathic hypotension     Malignant neoplasm prostate (HCC)        Past Surgical History:   Procedure Laterality Date    COLONOSCOPY      CORONARY ARTERY BYPASS GRAFT  07/10/2006    LIMA to the diagonal artery    INGUINAL HERNIA REPAIR Right 1996    LAMINECTOMY  1/2020    NE LAPS SURG [id number] RPBIC RAD W/NRV SPARING ROBOT N/A 04/22/2019    Procedure: PROSTATECTOMY RADICAL W/ROBOTICS, B/L PELVIC NODE DISSECT;  Surgeon: Taran  "MD Jair;  Location: [facility] OR;  Service: Urology    SKIN CANCER EXCISION  2016    left side nose    SPINE SURGERY  1/2020    THORACIC SPINE SURGERY      T3-T4, removal of an arachnoid cyst.    US GUIDED PROSTATE BIOPSY  02/28/2019       Family History   Problem Relation Age of Onset    Cancer Father         bladder    Lung cancer Father         metastatic       Social History     Occupational History    Not on file   Tobacco Use    Smoking status: Every Day     Current packs/day: 0.50     Average packs/day: 0.8 packs/day for 31.2 years (26.1 ttl pk-yrs)     Types: Cigarettes     Start date: 1994    Smokeless tobacco: Never   Vaping Use    Vaping status: Never Used   Substance and Sexual Activity    Alcohol use: Not Currently    Drug use: Never    Sexual activity: Not Currently     Partners: Female     Birth control/protection: None       Current Outpatient Medications on File Prior to Visit   Medication Sig    acetaminophen (TYLENOL) 500 mg tablet Take 500 mg by mouth Three times a day    aspirin (ECOTRIN LOW STRENGTH) 81 mg EC tablet Take 81 mg by mouth daily    DULoxetine (CYMBALTA) 60 mg delayed release capsule Take 1 capsule (60 mg total) by mouth daily    ibuprofen (MOTRIN) 200 mg tablet Take by mouth every 6 (six) hours as needed for mild pain    Melatonin 10 MG TABS Take by mouth    rosuvastatin (CRESTOR) 20 MG tablet Take 1 tablet (20 mg total) by mouth daily    [DISCONTINUED] gabapentin (NEURONTIN) 600 MG tablet Take 1 tablet (600 mg total) by mouth 4 (four) times a day    [DISCONTINUED] methocarbamol (ROBAXIN) 500 mg tablet TAKE 1 TABLET TWICE DAILY    [DISCONTINUED] traMADol (ULTRAM) 50 mg tablet Take 1 tablet (50 mg total) by mouth 3 (three) times a day as needed for moderate pain or severe pain     No current facility-administered medications on file prior to visit.       Allergies   Allergen Reactions    Atorvastatin        Physical Exam:    Ht 5' 8\" (1.727 m)   Wt 55.3 kg (122 lb)   BMI 18.55 " kg/m²     Constitutional:normal, well developed, well nourished, alert, in no distress and non-toxic and no overt pain behavior.  Eyes:anicteric  HEENT:grossly intact  Neck:supple, symmetric, trachea midline and no masses   Pulmonary:even and unlabored  Cardiovascular:No edema or pitting edema present  Skin:Normal without rashes or lesions and well hydrated  Psychiatric:Mood and affect appropriate  Neurologic:Cranial Nerves II-XII grossly intact  Musculoskeletal:normal    Imaging

## 2025-03-13 NOTE — TELEPHONE ENCOUNTER
PA for  jon  APPROVED     Date(s) approved   February 11, 2025 to March 13, 2026   Case #      Patient advised by          [x]MyChart Message  []Phone call   []LMOM  []L/M to call office as no active Communication consent on file  []Unable to leave detailed message as VM not approved on Communication consent   Vm didn't      Pharmacy advised by    [x]Fax  []Phone call  []Secure Chat    Specialty Pharmac No

## 2025-03-13 NOTE — ADDENDUM NOTE
Addended by: BOBBI NICOLE on: 3/13/2025 11:21 AM     Modules accepted: Orders     [FreeTextEntry1] : This 19-month-old child with normal birth history and development today seen for evaluation of the left thumb.  He was well until approximately 1 week ago when he fell at home and an object fell onto his thumb this caused swelling discomfort with a very minor abrasion along the pulp space.  He was seen at [facility] and the wound was dressed and he was placed into a splint which she has removed he is very comfortable on today's visit prior to this no complaints his past history is negative

## 2025-03-15 LAB
6MAM UR QL CFM: NEGATIVE NG/ML
7AMINOCLONAZEPAM UR QL CFM: NEGATIVE NG/ML
A-OH ALPRAZ UR QL CFM: NEGATIVE NG/ML
ACCEPTABLE CREAT UR QL: NORMAL MG/DL
ACCEPTIBLE SP GR UR QL: NORMAL
AMPHET UR QL CFM: NEGATIVE NG/ML
AMPHET UR QL CFM: NEGATIVE NG/ML
BUPRENORPHINE UR QL CFM: NEGATIVE NG/ML
BUTALBITAL UR QL CFM: NEGATIVE NG/ML
BZE UR QL CFM: NEGATIVE NG/ML
CODEINE UR QL CFM: NEGATIVE NG/ML
DESIPRAMINE UR QL CFM: NEGATIVE NG/ML
EDDP UR QL CFM: NEGATIVE NG/ML
ETHYL GLUCURONIDE UR QL CFM: NEGATIVE NG/ML
ETHYL SULFATE UR QL SCN: NEGATIVE NG/ML
FENTANYL UR QL CFM: NEGATIVE NG/ML
GLIADIN IGG SER IA-ACNC: NEGATIVE NG/ML
GLUCOSE 30M P 50 G LAC PO SERPL-MCNC: NEGATIVE NG/ML
HYDROCODONE UR QL CFM: NEGATIVE NG/ML
HYDROCODONE UR QL CFM: NEGATIVE NG/ML
HYDROMORPHONE UR QL CFM: NEGATIVE NG/ML
LORAZEPAM UR QL CFM: NEGATIVE NG/ML
MDMA UR QL CFM: NEGATIVE NG/ML
ME-PHENIDATE UR QL CFM: NEGATIVE NG/ML
MEPERIDINE UR QL CFM: NEGATIVE NG/ML
METHADONE UR QL CFM: NEGATIVE NG/ML
METHAMPHET UR QL CFM: NEGATIVE NG/ML
MORPHINE UR QL CFM: NEGATIVE NG/ML
MORPHINE UR QL CFM: NEGATIVE NG/ML
NITRITE UR QL: NORMAL UG/ML
NORBUPRENORPHINE UR QL CFM: NEGATIVE NG/ML
NORDIAZEPAM UR QL CFM: NEGATIVE NG/ML
NORFENTANYL UR QL CFM: NEGATIVE NG/ML
NORHYDROCODONE UR QL CFM: NEGATIVE NG/ML
NORHYDROCODONE UR QL CFM: NEGATIVE NG/ML
NORMEPERIDINE UR QL CFM: NEGATIVE NG/ML
NOROXYCODONE UR QL CFM: NEGATIVE NG/ML
OLANZAPINE QUANTIFICATION: NEGATIVE NG/ML
OPC-3373 QUANTIFICATION: NEGATIVE NG/ML
OXAZEPAM UR QL CFM: NEGATIVE NG/ML
OXYCODONE UR QL CFM: NEGATIVE NG/ML
OXYMORPHONE UR QL CFM: NEGATIVE NG/ML
OXYMORPHONE UR QL CFM: NEGATIVE NG/ML
PARA-FLUOROFENTANYL QUANTIFICATION: NORMAL NG/ML
RESULT ALL_PRESCRIBED MEDS AND SPECIAL INSTRUCTIONS: NORMAL
SECOBARBITAL UR QL CFM: NEGATIVE NG/ML
SL AMB 5F-ADB-M7 METABOLITE QUANTIFICATION: NEGATIVE NG/ML
SL AMB 7-OH-MITRAGYNINE (KRATOM ALKALOID) QUANTIFICATION: NEGATIVE NG/ML
SL AMB AB-FUBINACA-M3 METABOLITE QUANTIFICATION: NEGATIVE NG/ML
SL AMB ACETYL FENTANYL QUANTIFICATION: NORMAL NG/ML
SL AMB ACETYL NORFENTANYL QUANTIFICATION: NORMAL NG/ML
SL AMB ACRYL FENTANYL QUANTIFICATION: NORMAL NG/ML
SL AMB CARFENTANIL QUANTIFICATION: NORMAL NG/ML
SL AMB CLOZAPINE QUANTIFICATION: NEGATIVE NG/ML
SL AMB CTHC (MARIJUANA METABOLITE) QUANTIFICATION: NEGATIVE NG/ML
SL AMB DEXTROMETHORPHAN QUANTIFICATION: NEGATIVE NG/ML
SL AMB DEXTRORPHAN (DEXTROMETHORPHAN METABOLITE) QUANT: NEGATIVE NG/ML
SL AMB DEXTRORPHAN (DEXTROMETHORPHAN METABOLITE) QUANT: NEGATIVE NG/ML
SL AMB HYDROXYRISPERIDONE QUANTIFICATION: NEGATIVE NG/ML
SL AMB JWH018 METABOLITE QUANTIFICATION: NEGATIVE NG/ML
SL AMB JWH073 METABOLITE QUANTIFICATION: NEGATIVE NG/ML
SL AMB MDMB-FUBINACA-M1 METABOLITE QUANTIFICATION: NEGATIVE NG/ML
SL AMB N-DESMETHYL-TRAMADOL QUANTIFICATION: NORMAL NG/ML
SL AMB N-DESMETHYLCLOZAPINE QUANTIFICATION: NEGATIVE NG/ML
SL AMB NORQUETIAPINE QUANTIFICATION: NEGATIVE NG/ML
SL AMB PHENTERMINE QUANTIFICATION: NEGATIVE NG/ML
SL AMB QUETIAPINE QUANTIFICATION: NEGATIVE NG/ML
SL AMB RCS4 METABOLITE QUANTIFICATION: NEGATIVE NG/ML
SL AMB RISPERIDONE QUANTIFICATION: NEGATIVE NG/ML
SL AMB RITALINIC ACID QUANTIFICATION: NEGATIVE NG/ML
SPECIMEN DRAWN SERPL: NEGATIVE NG/ML
SPECIMEN PH ACCEPTABLE UR: NORMAL
TAPENTADOL UR QL CFM: NEGATIVE NG/ML
TEMAZEPAM UR QL CFM: NEGATIVE NG/ML
TEMAZEPAM UR QL CFM: NEGATIVE NG/ML
TRAMADOL UR QL CFM: NORMAL NG/ML
URATE/CREAT 24H UR: NORMAL NG/ML

## 2025-03-27 ENCOUNTER — HOSPITAL ENCOUNTER (OUTPATIENT)
Dept: MRI IMAGING | Facility: HOSPITAL | Age: 70
End: 2025-03-27
Payer: MEDICARE

## 2025-03-27 DIAGNOSIS — M51.16 LUMBAR DISC DISEASE WITH RADICULOPATHY: ICD-10-CM

## 2025-03-27 DIAGNOSIS — Z98.890 HISTORY OF LUMBAR SURGERY: ICD-10-CM

## 2025-03-27 DIAGNOSIS — M47.816 LUMBAR SPONDYLOSIS: ICD-10-CM

## 2025-03-27 PROCEDURE — 72148 MRI LUMBAR SPINE W/O DYE: CPT

## 2025-05-26 ENCOUNTER — APPOINTMENT (EMERGENCY)
Dept: RADIOLOGY | Facility: HOSPITAL | Age: 70
End: 2025-05-26
Payer: MEDICARE

## 2025-05-26 ENCOUNTER — HOSPITAL ENCOUNTER (INPATIENT)
Facility: HOSPITAL | Age: 70
LOS: 3 days | Discharge: HOME | End: 2025-05-29
Attending: EMERGENCY MEDICINE | Admitting: INTERNAL MEDICINE
Payer: MEDICARE

## 2025-05-26 DIAGNOSIS — J06.9 URI (UPPER RESPIRATORY INFECTION): Primary | ICD-10-CM

## 2025-05-26 DIAGNOSIS — R06.00 DYSPNEA: ICD-10-CM

## 2025-05-26 DIAGNOSIS — R68.89 RIGORS: ICD-10-CM

## 2025-05-26 DIAGNOSIS — J18.9 SEPSIS DUE TO PNEUMONIA (HCC): ICD-10-CM

## 2025-05-26 DIAGNOSIS — A41.9 SEPSIS DUE TO PNEUMONIA (HCC): ICD-10-CM

## 2025-05-26 LAB
ALBUMIN SERPL BCG-MCNC: 4.2 G/DL (ref 3.5–5)
ALP SERPL-CCNC: 69 U/L (ref 34–104)
ALT SERPL W P-5'-P-CCNC: 9 U/L (ref 7–52)
ANION GAP SERPL CALCULATED.3IONS-SCNC: 11 MMOL/L (ref 4–13)
APTT PPP: 32 SECONDS (ref 23–34)
AST SERPL W P-5'-P-CCNC: 11 U/L (ref 13–39)
BASOPHILS # BLD AUTO: 0.05 THOUSANDS/ÂΜL (ref 0–0.1)
BASOPHILS NFR BLD AUTO: 0 % (ref 0–1)
BILIRUB SERPL-MCNC: 0.84 MG/DL (ref 0.2–1)
BUN SERPL-MCNC: 18 MG/DL (ref 5–25)
CALCIUM SERPL-MCNC: 9.5 MG/DL (ref 8.4–10.2)
CHLORIDE SERPL-SCNC: 100 MMOL/L (ref 96–108)
CO2 SERPL-SCNC: 24 MMOL/L (ref 21–32)
CREAT SERPL-MCNC: 0.97 MG/DL (ref 0.6–1.3)
EOSINOPHIL # BLD AUTO: 0.03 THOUSAND/ÂΜL (ref 0–0.61)
EOSINOPHIL NFR BLD AUTO: 0 % (ref 0–6)
ERYTHROCYTE [DISTWIDTH] IN BLOOD BY AUTOMATED COUNT: 13 % (ref 11.6–15.1)
FLUAV RNA RESP QL NAA+PROBE: NEGATIVE
FLUBV RNA RESP QL NAA+PROBE: NEGATIVE
GFR SERPL CREATININE-BSD FRML MDRD: 79 ML/MIN/1.73SQ M
GLUCOSE SERPL-MCNC: 105 MG/DL (ref 65–140)
HCT VFR BLD AUTO: 44 % (ref 36.5–49.3)
HGB BLD-MCNC: 14.2 G/DL (ref 12–17)
IMM GRANULOCYTES # BLD AUTO: 0.06 THOUSAND/UL (ref 0–0.2)
IMM GRANULOCYTES NFR BLD AUTO: 0 % (ref 0–2)
INR PPP: 1.09 (ref 0.85–1.19)
LACTATE SERPL-SCNC: 0.8 MMOL/L (ref 0.5–2)
LACTATE SERPL-SCNC: 2.3 MMOL/L (ref 0.5–2)
LYMPHOCYTES # BLD AUTO: 2.19 THOUSANDS/ÂΜL (ref 0.6–4.47)
LYMPHOCYTES NFR BLD AUTO: 12 % (ref 14–44)
MCH RBC QN AUTO: 30 PG (ref 26.8–34.3)
MCHC RBC AUTO-ENTMCNC: 32.3 G/DL (ref 31.4–37.4)
MCV RBC AUTO: 93 FL (ref 82–98)
MONOCYTES # BLD AUTO: 1.65 THOUSAND/ÂΜL (ref 0.17–1.22)
MONOCYTES NFR BLD AUTO: 9 % (ref 4–12)
NEUTROPHILS # BLD AUTO: 14.91 THOUSANDS/ÂΜL (ref 1.85–7.62)
NEUTS SEG NFR BLD AUTO: 79 % (ref 43–75)
NRBC BLD AUTO-RTO: 0 /100 WBCS
PLATELET # BLD AUTO: 208 THOUSANDS/UL (ref 149–390)
PMV BLD AUTO: 10.8 FL (ref 8.9–12.7)
POTASSIUM SERPL-SCNC: 4 MMOL/L (ref 3.5–5.3)
PROCALCITONIN SERPL-MCNC: 0.66 NG/ML
PROT SERPL-MCNC: 7.5 G/DL (ref 6.4–8.4)
PROTHROMBIN TIME: 14.7 SECONDS (ref 12.3–15)
RBC # BLD AUTO: 4.73 MILLION/UL (ref 3.88–5.62)
RSV RNA RESP QL NAA+PROBE: NEGATIVE
SARS-COV-2 RNA RESP QL NAA+PROBE: NEGATIVE
SODIUM SERPL-SCNC: 135 MMOL/L (ref 135–147)
WBC # BLD AUTO: 18.89 THOUSAND/UL (ref 4.31–10.16)

## 2025-05-26 PROCEDURE — 85610 PROTHROMBIN TIME: CPT | Performed by: EMERGENCY MEDICINE

## 2025-05-26 PROCEDURE — 94664 DEMO&/EVAL PT USE INHALER: CPT

## 2025-05-26 PROCEDURE — 87040 BLOOD CULTURE FOR BACTERIA: CPT | Performed by: EMERGENCY MEDICINE

## 2025-05-26 PROCEDURE — 80053 COMPREHEN METABOLIC PANEL: CPT | Performed by: EMERGENCY MEDICINE

## 2025-05-26 PROCEDURE — 83605 ASSAY OF LACTIC ACID: CPT | Performed by: EMERGENCY MEDICINE

## 2025-05-26 PROCEDURE — 36415 COLL VENOUS BLD VENIPUNCTURE: CPT | Performed by: EMERGENCY MEDICINE

## 2025-05-26 PROCEDURE — 85730 THROMBOPLASTIN TIME PARTIAL: CPT | Performed by: EMERGENCY MEDICINE

## 2025-05-26 PROCEDURE — 96374 THER/PROPH/DIAG INJ IV PUSH: CPT

## 2025-05-26 PROCEDURE — 99222 1ST HOSP IP/OBS MODERATE 55: CPT | Performed by: INTERNAL MEDICINE

## 2025-05-26 PROCEDURE — 84145 PROCALCITONIN (PCT): CPT | Performed by: EMERGENCY MEDICINE

## 2025-05-26 PROCEDURE — 93005 ELECTROCARDIOGRAM TRACING: CPT

## 2025-05-26 PROCEDURE — 99285 EMERGENCY DEPT VISIT HI MDM: CPT

## 2025-05-26 PROCEDURE — 94760 N-INVAS EAR/PLS OXIMETRY 1: CPT

## 2025-05-26 PROCEDURE — 71045 X-RAY EXAM CHEST 1 VIEW: CPT

## 2025-05-26 PROCEDURE — 85025 COMPLETE CBC W/AUTO DIFF WBC: CPT | Performed by: EMERGENCY MEDICINE

## 2025-05-26 PROCEDURE — 0241U HB NFCT DS VIR RESP RNA 4 TRGT: CPT | Performed by: EMERGENCY MEDICINE

## 2025-05-26 PROCEDURE — 99285 EMERGENCY DEPT VISIT HI MDM: CPT | Performed by: EMERGENCY MEDICINE

## 2025-05-26 RX ORDER — IBUPROFEN 600 MG/1
600 TABLET, FILM COATED ORAL EVERY 6 HOURS PRN
Status: DISCONTINUED | OUTPATIENT
Start: 2025-05-26 | End: 2025-05-27

## 2025-05-26 RX ORDER — TRAMADOL HYDROCHLORIDE 50 MG/1
50 TABLET ORAL 3 TIMES DAILY PRN
Status: DISCONTINUED | OUTPATIENT
Start: 2025-05-26 | End: 2025-05-27

## 2025-05-26 RX ORDER — ATORVASTATIN CALCIUM 40 MG/1
40 TABLET, FILM COATED ORAL
Status: DISCONTINUED | OUTPATIENT
Start: 2025-05-27 | End: 2025-05-29 | Stop reason: HOSPADM

## 2025-05-26 RX ORDER — GUAIFENESIN/DEXTROMETHORPHAN 100-10MG/5
10 SYRUP ORAL EVERY 4 HOURS PRN
Status: DISCONTINUED | OUTPATIENT
Start: 2025-05-26 | End: 2025-05-29 | Stop reason: HOSPADM

## 2025-05-26 RX ORDER — SODIUM CHLORIDE 9 MG/ML
100 INJECTION, SOLUTION INTRAVENOUS CONTINUOUS
Status: DISPENSED | OUTPATIENT
Start: 2025-05-26 | End: 2025-05-27

## 2025-05-26 RX ORDER — METHOCARBAMOL 500 MG/1
500 TABLET, FILM COATED ORAL 2 TIMES DAILY PRN
Status: DISCONTINUED | OUTPATIENT
Start: 2025-05-26 | End: 2025-05-29 | Stop reason: HOSPADM

## 2025-05-26 RX ORDER — DULOXETIN HYDROCHLORIDE 60 MG/1
60 CAPSULE, DELAYED RELEASE ORAL DAILY
Status: DISCONTINUED | OUTPATIENT
Start: 2025-05-27 | End: 2025-05-29 | Stop reason: HOSPADM

## 2025-05-26 RX ORDER — ASPIRIN 81 MG/1
81 TABLET ORAL DAILY
Status: DISCONTINUED | OUTPATIENT
Start: 2025-05-27 | End: 2025-05-29 | Stop reason: HOSPADM

## 2025-05-26 RX ORDER — SENNOSIDES 8.6 MG
1 TABLET ORAL
Status: DISCONTINUED | OUTPATIENT
Start: 2025-05-26 | End: 2025-05-29 | Stop reason: HOSPADM

## 2025-05-26 RX ORDER — GABAPENTIN 300 MG/1
600 CAPSULE ORAL 4 TIMES DAILY
Status: DISCONTINUED | OUTPATIENT
Start: 2025-05-26 | End: 2025-05-29 | Stop reason: HOSPADM

## 2025-05-26 RX ORDER — ACETAMINOPHEN 325 MG/1
975 TABLET ORAL ONCE
Status: COMPLETED | OUTPATIENT
Start: 2025-05-26 | End: 2025-05-26

## 2025-05-26 RX ORDER — SODIUM CHLORIDE, SODIUM GLUCONATE, SODIUM ACETATE, POTASSIUM CHLORIDE, MAGNESIUM CHLORIDE, SODIUM PHOSPHATE, DIBASIC, AND POTASSIUM PHOSPHATE .53; .5; .37; .037; .03; .012; .00082 G/100ML; G/100ML; G/100ML; G/100ML; G/100ML; G/100ML; G/100ML
1000 INJECTION, SOLUTION INTRAVENOUS ONCE
Status: COMPLETED | OUTPATIENT
Start: 2025-05-26 | End: 2025-05-26

## 2025-05-26 RX ORDER — ENOXAPARIN SODIUM 100 MG/ML
40 INJECTION SUBCUTANEOUS DAILY
Status: DISCONTINUED | OUTPATIENT
Start: 2025-05-27 | End: 2025-05-29 | Stop reason: HOSPADM

## 2025-05-26 RX ORDER — ACETAMINOPHEN 325 MG/1
975 TABLET ORAL EVERY 6 HOURS PRN
Status: DISCONTINUED | OUTPATIENT
Start: 2025-05-26 | End: 2025-05-29 | Stop reason: HOSPADM

## 2025-05-26 RX ORDER — ONDANSETRON 2 MG/ML
4 INJECTION INTRAMUSCULAR; INTRAVENOUS EVERY 6 HOURS PRN
Status: DISCONTINUED | OUTPATIENT
Start: 2025-05-26 | End: 2025-05-29 | Stop reason: HOSPADM

## 2025-05-26 RX ORDER — VANCOMYCIN HYDROCHLORIDE 750 MG/150ML
15 INJECTION, SOLUTION INTRAVENOUS ONCE
Status: COMPLETED | OUTPATIENT
Start: 2025-05-26 | End: 2025-05-26

## 2025-05-26 RX ORDER — NICOTINE 21 MG/24HR
1 PATCH, TRANSDERMAL 24 HOURS TRANSDERMAL DAILY
Status: DISCONTINUED | OUTPATIENT
Start: 2025-05-27 | End: 2025-05-26

## 2025-05-26 RX ADMIN — TRAMADOL HYDROCHLORIDE 50 MG: 50 TABLET, COATED ORAL at 22:13

## 2025-05-26 RX ADMIN — SODIUM CHLORIDE 100 ML/HR: 0.9 INJECTION, SOLUTION INTRAVENOUS at 22:43

## 2025-05-26 RX ADMIN — SODIUM CHLORIDE 1000 ML: 0.9 INJECTION, SOLUTION INTRAVENOUS at 16:58

## 2025-05-26 RX ADMIN — ACETAMINOPHEN 975 MG: 325 TABLET ORAL at 16:59

## 2025-05-26 RX ADMIN — VANCOMYCIN HYDROCHLORIDE 750 MG: 750 INJECTION, SOLUTION INTRAVENOUS at 18:09

## 2025-05-26 RX ADMIN — GABAPENTIN 600 MG: 300 CAPSULE ORAL at 22:13

## 2025-05-26 RX ADMIN — CEFEPIME 2000 MG: 2 INJECTION, POWDER, FOR SOLUTION INTRAVENOUS at 17:41

## 2025-05-26 RX ADMIN — SODIUM CHLORIDE, SODIUM GLUCONATE, SODIUM ACETATE, POTASSIUM CHLORIDE, MAGNESIUM CHLORIDE, SODIUM PHOSPHATE, DIBASIC, AND POTASSIUM PHOSPHATE 1000 ML: .53; .5; .37; .037; .03; .012; .00082 INJECTION, SOLUTION INTRAVENOUS at 21:42

## 2025-05-26 RX ADMIN — SODIUM CHLORIDE 1000 ML: 0.9 INJECTION, SOLUTION INTRAVENOUS at 17:39

## 2025-05-26 NOTE — ED PROVIDER NOTES
Time reflects when diagnosis was documented in both MDM as applicable and the Disposition within this note       Time User Action Codes Description Comment    5/26/2025  5:54 PM Arslan Tony [J06.9] URI (upper respiratory infection)     5/26/2025  5:54 PM Arslan Tony [R68.89] Rigors     5/26/2025  5:54 PM Chavo Arslan Shamir [R06.00] Dyspnea           ED Disposition       ED Disposition   Admit    Condition   Stable    Date/Time   Mon May 26, 2025  5:41 PM    Comment   Case was discussed with d and the patient's admission status was agreed to be Admission Status: inpatient status to the service of Dr. kizzy García               Assessment & Plan       Medical Decision Making  Patient is 69-year-old male presenting for concerns of fever cough congestion chills and shortness of breath.  DDx: Viral versus bacterial pneumonia, ACS, arrhythmia, electrolyte abnormality dehydration  Based on patient presentation and physical exam findings, primary concern is a virus bacterial pneumonia.  Patient made a sepsis alert.  Full sepsis workup ordered.  Plan for fluids and Tylenol.  Will adjust antibiotics based on labs and imaging findings.  Chest x-ray ordered as well.  - Patient made a sepsis alert, given IV antibiotics and fluids.  Plan for admission for further workup/management of possible URI.    Problems Addressed:  Dyspnea: acute illness or injury  Rigors: acute illness or injury  URI (upper respiratory infection): acute illness or injury    Amount and/or Complexity of Data Reviewed  Labs: ordered. Decision-making details documented in ED Course.  Radiology: ordered.    Risk  OTC drugs.  Prescription drug management.  Decision regarding hospitalization.        ED Course as of 05/26/25 1755   Mon May 26, 2025   1712 WBC(!): 18.89   1726 LACTIC ACID(!): 2.3   1736 Procalcitonin(!): 0.66       Medications   sodium chloride 0.9 % bolus 1,000 mL (1,000 mL Intravenous New Bag 5/26/25 1658)     Followed by   sodium chloride 0.9 %  bolus 1,000 mL (1,000 mL Intravenous New Bag 5/26/25 1739)   cefepime (MAXIPIME) 2 g/50 mL dextrose IVPB (2,000 mg Intravenous New Bag 5/26/25 1741)   vancomycin (VANCOCIN) IVPB (premix in dextrose) 750 mg 150 mL (has no administration in time range)   acetaminophen (TYLENOL) tablet 975 mg (975 mg Oral Given 5/26/25 1659)       ED Risk Strat Scores                    (ISAR) Identification of Seniors at Risk  Before the illness or injury that brought you to the Emergency, did you need someone to help you on a regular basis?: 0  In the last 24 hours, have you needed more help than usual?: 0  Have you been hospitalized for one or more nights during the past 6 months?: 0  In general, do you see well?: 0  In general, do you have serious problems with your memory?: 0  Do you take more than three different medications every day?: 0  ISAR Score: 0            SBIRT 20yo+      Flowsheet Row Most Recent Value   Initial Alcohol Screen: US AUDIT-C     1. How often do you have a drink containing alcohol? 0 Filed at: 05/26/2025 1643   2. How many drinks containing alcohol do you have on a typical day you are drinking?  0 Filed at: 05/26/2025 1643   3a. Male UNDER 65: How often do you have five or more drinks on one occasion? 0 Filed at: 05/26/2025 1643   3b. FEMALE Any Age, or MALE 65+: How often do you have 4 or more drinks on one occassion? 0 Filed at: 05/26/2025 1643   Audit-C Score 0 Filed at: 05/26/2025 1643   DOROTHY: How many times in the past year have you...    Used an illegal drug or used a prescription medication for non-medical reasons? Never Filed at: 05/26/2025 1643                            History of Present Illness       Chief Complaint   Patient presents with    Cough    Fever    Shortness of Breath     Worse with exertion       Past Medical History[1]   Past Surgical History[2]   Family History[3]   Social History[4]   E-Cigarette/Vaping    E-Cigarette Use Never User       E-Cigarette/Vaping Substances      I have  reviewed and agree with the history as documented.     HPI  Patient is 69-year-old male presenting for cough congestion fever chills shortness of breath fatigue ROTHMAN, decreased p.o. intake for the past 4 to 5 days.  Past medical history significant for CAD with CABG, active smoker, history of prostate cancer.  Patient denies any nausea vomiting diarrhea.  Denies any known sick contacts.  States he feels more short of breath when he gets up and walks around.  Does not use oxygen at home at baseline.  Has not needed any nebulizer treatments at home does not have 1 at home.  States he actively smokes daily.  Denies any chest pain no abdominal pain.  No lower extremity edema.  Review of Systems   Constitutional:  Positive for chills, fatigue and fever.   HENT:  Positive for congestion and rhinorrhea.    Eyes: Negative.    Respiratory:  Positive for cough and shortness of breath.    Cardiovascular: Negative.  Negative for chest pain.   Gastrointestinal: Negative.  Negative for diarrhea, nausea and vomiting.   Endocrine: Negative.    Genitourinary: Negative.    Musculoskeletal: Negative.    Skin: Negative.    Allergic/Immunologic: Negative.    Neurological:  Positive for light-headedness.   Hematological: Negative.    Psychiatric/Behavioral: Negative.             Objective       ED Triage Vitals [05/26/25 1640]   Temperature Pulse Blood Pressure Respirations SpO2 Patient Position - Orthostatic VS   (!) 101.7 °F (38.7 °C) 101 156/72 22 92 % Sitting      Temp Source Heart Rate Source BP Location FiO2 (%) Pain Score    Temporal Monitor Left arm -- No Pain      Vitals      Date and Time Temp Pulse SpO2 Resp BP Pain Score FACES Pain Rating User   05/26/25 1745 -- 80 96 % 15 111/57 -- -- AM   05/26/25 1743 -- 82 95 % 16 -- -- -- AM   05/26/25 1730 -- 80 97 % 16 115/55 -- -- AM   05/26/25 1715 -- 83 96 % 16 121/59 -- -- AM   05/26/25 1700 -- 93 93 % 16 132/62 -- -- AM   05/26/25 1659 -- -- -- -- -- Med Not Given for Pain - for  MAR use only -- AM   05/26/25 1640 101.7 °F (38.7 °C) tylenol at 10 this morning 101 92 % 22 156/72 No Pain -- NAD            Physical Exam  Vitals and nursing note reviewed.   Constitutional:       Appearance: Normal appearance. He is normal weight. He is ill-appearing.      Comments: Patient has full body rigors.  Vital stable the patient is febrile.  Mildly tachypneic but satting well on room air.  Placed on nasal cannula 2 L for comfort.   HENT:      Head: Normocephalic and atraumatic.      Right Ear: Tympanic membrane, ear canal and external ear normal.      Left Ear: Tympanic membrane, ear canal and external ear normal.      Nose: Nose normal.      Mouth/Throat:      Mouth: Mucous membranes are moist.      Pharynx: Oropharynx is clear.     Eyes:      Extraocular Movements: Extraocular movements intact.      Conjunctiva/sclera: Conjunctivae normal.      Pupils: Pupils are equal, round, and reactive to light.       Cardiovascular:      Rate and Rhythm: Normal rate and regular rhythm.      Pulses: Normal pulses.      Heart sounds: Normal heart sounds.   Pulmonary:      Breath sounds: Decreased breath sounds present. No wheezing, rhonchi or rales.      Comments: Patient has decreased breath sounds bilaterally but no focal rhonchi rales or wheezes.  Patient mildly tachypneic on room air, placed on 2 L nasal cannula for comfort.  Phonating appropriately, protecting airway, tolerating secretions.  Abdominal:      General: Abdomen is flat. Bowel sounds are normal.      Palpations: Abdomen is soft.     Musculoskeletal:         General: Normal range of motion.      Cervical back: Normal range of motion and neck supple.     Skin:     General: Skin is warm and dry.      Capillary Refill: Capillary refill takes less than 2 seconds.     Neurological:      General: No focal deficit present.      Mental Status: He is alert and oriented to person, place, and time.     Psychiatric:         Mood and Affect: Mood normal.          Behavior: Behavior normal.         Thought Content: Thought content normal.         Judgment: Judgment normal.         Results Reviewed       Procedure Component Value Units Date/Time    FLU/RSV/COVID - if FLU/RSV clinically relevant [[id number]]  (Normal) Collected: 05/26/25 1654    Lab Status: Final result Specimen: Nares from Nose Updated: 05/26/25 1745     SARS-CoV-2 Negative     INFLUENZA A PCR Negative     INFLUENZA B PCR Negative     RSV PCR Negative    Narrative:      This test has been performed using the CoV-2/Flu/RSV plus assay on the [employer] platform. This test has been validated by the  and verified by the performing laboratory.     This test is designed to amplify and detect the following: nucleocapsid (N), envelope (E), and RNA-dependent RNA polymerase (RdRP) genes of the SARS-CoV-2 genome; matrix (M), basic polymerase (PB2), and acidic protein (PA) segments of the influenza A genome; matrix (M) and non-structural protein (NS) segments of the influenza B genome, and the nucleocapsid genes of RSV A and RSV B.     Positive results are indicative of the presence of Flu A, Flu B, RSV, and/or SARS-CoV-2 RNA. Positive results for SARS-CoV-2 or suspected novel influenza should be reported to state, local, or federal health departments according to local reporting requirements.      All results should be assessed in conjunction with clinical presentation and other laboratory markers for clinical management.     FOR PEDIATRIC PATIENTS - copy/paste COVID Guidelines URL to browser: [email or web address]       Procalcitonin [[id number]]  (Abnormal) Collected: 05/26/25 1654    Lab Status: Final result Specimen: Blood from Arm, Right Updated: 05/26/25 1732     Procalcitonin 0.66 ng/ml     Lactic acid [[id number]]  (Abnormal) Collected: 05/26/25 1654    Lab Status: Final result Specimen: Blood from Arm, Right Updated: 05/26/25 1724     LACTIC  ACID 2.3 mmol/L     Narrative:      Result may be elevated if tourniquet was used during collection.    Comprehensive metabolic panel [[id number]]  (Abnormal) Collected: 05/26/25 1654    Lab Status: Final result Specimen: Blood from Arm, Right Updated: 05/26/25 1724     Sodium 135 mmol/L      Potassium 4.0 mmol/L      Chloride 100 mmol/L      CO2 24 mmol/L      ANION GAP 11 mmol/L      BUN 18 mg/dL      Creatinine 0.97 mg/dL      Glucose 105 mg/dL      Calcium 9.5 mg/dL      AST 11 U/L      ALT 9 U/L      Alkaline Phosphatase 69 U/L      Total Protein 7.5 g/dL      Albumin 4.2 g/dL      Total Bilirubin 0.84 mg/dL      eGFR 79 ml/min/1.73sq m     Narrative:      National Kidney Disease Foundation guidelines for Chronic Kidney Disease (CKD):     Stage 1 with normal or high GFR (GFR > 90 mL/min/1.73 square meters)    Stage 2 Mild CKD (GFR = 60-89 mL/min/1.73 square meters)    Stage 3A Moderate CKD (GFR = 45-59 mL/min/1.73 square meters)    Stage 3B Moderate CKD (GFR = 30-44 mL/min/1.73 square meters)    Stage 4 Severe CKD (GFR = 15-29 mL/min/1.73 square meters)    Stage 5 End Stage CKD (GFR <15 mL/min/1.73 square meters)  Note: GFR calculation is accurate only with a steady state creatinine    Lactic acid 2 Hours [[id number]]     Lab Status: No result Specimen: Blood     Protime-INR [[id number]]  (Normal) Collected: 05/26/25 1654    Lab Status: Final result Specimen: Blood from Arm, Right Updated: 05/26/25 1723     Protime 14.7 seconds      INR 1.09    Narrative:      INR Therapeutic Range    Indication                                             INR Range      Atrial Fibrillation                                               2.0-3.0  Hypercoagulable State                                    2.0.2.3  Left Ventricular Asist Device                            2.0-3.0  Mechanical Heart Valve                                  -    Aortic(with afib, MI, embolism, HF, LA enlargement,    and/or coagulopathy)                                      2.0-3.0 (2.5-3.5)     Mitral                                                             2.5-3.5  Prosthetic/Bioprosthetic Heart Valve               2.0-3.0  Venous thromboembolism (VTE: VT, PE        2.0-3.0    APTT [[id number]]  (Normal) Collected: 05/26/25 1654    Lab Status: Final result Specimen: Blood from Arm, Right Updated: 05/26/25 1723     PTT 32 seconds     CBC and differential [[id number]]  (Abnormal) Collected: 05/26/25 1654    Lab Status: Final result Specimen: Blood from Arm, Right Updated: 05/26/25 1710     WBC 18.89 Thousand/uL      RBC 4.73 Million/uL      Hemoglobin 14.2 g/dL      Hematocrit 44.0 %      MCV 93 fL      MCH 30.0 pg      MCHC 32.3 g/dL      RDW 13.0 %      MPV 10.8 fL      Platelets 208 Thousands/uL      nRBC 0 /100 WBCs      Segmented % 79 %      Immature Grans % 0 %      Lymphocytes % 12 %      Monocytes % 9 %      Eosinophils Relative 0 %      Basophils Relative 0 %      Absolute Neutrophils 14.91 Thousands/µL      Absolute Immature Grans 0.06 Thousand/uL      Absolute Lymphocytes 2.19 Thousands/µL      Absolute Monocytes 1.65 Thousand/µL      Eosinophils Absolute 0.03 Thousand/µL      Basophils Absolute 0.05 Thousands/µL     Blood culture #2 [[id number]] Collected: 05/26/25 1654    Lab Status: In process Specimen: Blood from Arm, Left Updated: 05/26/25 1704    Blood culture #1 [[id number]] Collected: 05/26/25 1654    Lab Status: In process Specimen: Blood from Arm, Right Updated: 05/26/25 1704    UA w Reflex to Microscopic w Reflex to Culture [[id number]]     Lab Status: No result Specimen: Urine             XR chest 1 view portable    (Results Pending)       ECG 12 Lead Documentation Only    Date/Time: 5/26/2025 5:01 PM    Performed by: Arslan Tony MD  Authorized by: Arslan Tony MD    Indications / Diagnosis:  Fever, shortness of breath  ECG reviewed by me, the ED Provider: yes    Patient location:  ED  Interpretation:     Interpretation: normal    Rate:     ECG rate  assessment: tachycardic    Rhythm:     Rhythm: sinus rhythm    Ectopy:     Ectopy: none    QRS:     QRS axis:  Normal    QRS intervals:  Normal  Conduction:     Conduction: normal    ST segments:     ST segments:  Normal  T waves:     T waves: normal        ED Medication and Procedure Management   Prior to Admission Medications   Prescriptions Last Dose Informant Patient Reported? Taking?   DULoxetine (CYMBALTA) 60 mg delayed release capsule  Self No No   Sig: Take 1 capsule (60 mg total) by mouth daily   Melatonin 10 MG TABS  Self Yes No   Sig: Take by mouth   acetaminophen (TYLENOL) 500 mg tablet  Self Yes No   Sig: Take 500 mg by mouth Three times a day   aspirin (ECOTRIN LOW STRENGTH) 81 mg EC tablet  Self Yes No   Sig: Take 81 mg by mouth daily   gabapentin (NEURONTIN) 600 MG tablet   No No   Sig: Take 1 tablet (600 mg total) by mouth 4 (four) times a day   ibuprofen (MOTRIN) 200 mg tablet  Self Yes No   Sig: Take by mouth every 6 (six) hours as needed for mild pain   methocarbamol (ROBAXIN) 500 mg tablet   No No   Sig: Take 1 tablet (500 mg total) by mouth 2 (two) times a day   rosuvastatin (CRESTOR) 20 MG tablet  Self No No   Sig: Take 1 tablet (20 mg total) by mouth daily   traMADol (ULTRAM) 50 mg tablet   No No   Sig: Take 1 tablet (50 mg total) by mouth 3 (three) times a day as needed for moderate pain or severe pain      Facility-Administered Medications: None     Patient's Medications   Discharge Prescriptions    No medications on file     No discharge procedures on file.  ED SEPSIS DOCUMENTATION   Time reflects when diagnosis was documented in both MDM as applicable and the Disposition within this note       Time User Action Codes Description Comment    5/26/2025  5:54 PM Arslan Tony [J06.9] URI (upper respiratory infection)     5/26/2025  5:54 PM Arslan Tony [R68.89] Rigors     5/26/2025  5:54 PM Arslan Tony [R06.00] Dyspnea            Initial Sepsis Screening       Row Name 05/26/25  "1727 05/26/25 1713             Is the patient's history suggestive of a new or worsening infection? -- Yes (Proceed)  -KAREN       Suspected source of infection -- pneumonia  -KAREN       Indicate SIRS criteria -- Hyperthemia > 38.3C (100.9F) OR Hypothermia <36C (96.8F);Tachycardia > 90 bpm;Tachypnea > 20 resp per min;Leukocytosis (WBC > 29810 IJL) OR Leukopenia (WBC <4000 IJL) OR Bandemia (WBC >10% bands)  -KAREN       Are two or more of the above signs & symptoms of infection both present and new to the patient? -- Yes (Proceed)  -KAREN       Assess for evidence of organ dysfunction: Are any of the below criteria present within 6 hours of suspected infection and SIRS criteria that are NOT considered to be chronic conditions? Lactate > 2.0  -KAREN --       Date of presentation of severe sepsis 05/26/25  -KAREN --       Time of presentation of severe sepsis 1727  -KAREN --       Sepsis Note: Click \"NEXT\" below (NOT \"close\") to generate sepsis note based on above information. -- --                 User Key  (r) = Recorded By, (t) = Taken By, (c) = Cosigned By      Initials Name Provider Type    KAREN Arslan Tony MD Physician                         [1]   Past Medical History:  Diagnosis Date    BCC (basal cell carcinoma)     in past left side of nose    Cancer (HCC) 1/2019    Chronic pain disorder     low back and down rle    Coronary artery disease     cabg x 1 2006    Dyslipidemia     Hyperlipidemia     Idiopathic hypotension     Malignant neoplasm prostate (HCC)    [2]   Past Surgical History:  Procedure Laterality Date    COLONOSCOPY      CORONARY ARTERY BYPASS GRAFT  07/10/2006    LIMA to the diagonal artery    INGUINAL HERNIA REPAIR Right 1996    LAMINECTOMY  1/2020    VT LAPS SURG [id number] RPBIC RAD W/NRV SPARING ROBOT N/A 04/22/2019    Procedure: PROSTATECTOMY RADICAL W/ROBOTICS, B/L PELVIC NODE DISSECT;  Surgeon: Taran Adam MD;  Location: [facility];  Service: Urology    SKIN CANCER EXCISION  2016    left side nose    SPINE " SURGERY  1/2020    THORACIC SPINE SURGERY      T3-T4, removal of an arachnoid cyst.    US GUIDED PROSTATE BIOPSY  02/28/2019   [3]   Family History  Problem Relation Name Age of Onset    Cancer Father Arslan         bladder    Lung cancer Father Arslan         metastatic   [4]   Social History  Tobacco Use    Smoking status: Every Day     Current packs/day: 0.50     Average packs/day: 0.8 packs/day for 31.4 years (26.2 ttl pk-yrs)     Types: Cigarettes     Start date: 1994    Smokeless tobacco: Never   Vaping Use    Vaping status: Never Used   Substance Use Topics    Alcohol use: Not Currently    Drug use: Never        Arslan Tony MD  05/26/25 4868

## 2025-05-26 NOTE — ASSESSMENT & PLAN NOTE
Patient presenting with 4 to 5 days of congestion, fever chills and cough.  His chest x-ray shows questionable patchy right lower lobe infiltrates however official read pending.  Procalcitonin 0.66 with an elevated white count and fevers.  He is meeting sepsis criteria with a likely source of pneumonia.  He received cefepime and vancomycin in the ER  Obtain strep antigen  Trend WBC, fever, procalcitonin  Await official read of CXR  Though he is meeting sepsis criteria, he has no factors that would suggest broad-spectrum antibiotics such as immunosuppression or recent hospitalization.  Will narrow to ceftriaxone and trend as above recovery

## 2025-05-26 NOTE — ED ATTENDING ATTESTATION
5/26/2025  I, Leoncio Rodriguez III, DO, saw and evaluated the patient. I have discussed the patient with the resident/non-physician practitioner and agree with the resident's/non-physician practitioner's findings, Plan of Care, and MDM as documented in the resident's/non-physician practitioner's note, except where noted. All available labs and Radiology studies were reviewed.  I was present for key portions of any procedure(s) performed by the resident/non-physician practitioner and I was immediately available to provide assistance.       At this point I agree with the current assessment done in the Emergency Department.  I have conducted an independent evaluation of this patient a history and physical is as follows:    ED Course  ED Course as of 05/26/25 1844   Mon May 26, 2025   1700 Patient seen, evaluated, examined, chart reviewed, after presentation from PGY 3 EM resident, Dr. Tony, sepsis alert initiated as per protocol by RN staff, this is a frail 69-year-old male presents with a 5 to 6-day history of worsening shortness of breath, cough, productive sputum green to yellow in color, currently febrile, patient's been taking Tylenol twice daily for the fever reported at home since the onset illness.  No hemoptysis, no recent travel inside the geographical area, prior CABG at [facility] in the remote past: Single-vessel CABG LIMA to diagonal 2006, history of dyslipidemia, tobacco abuse at least 1 pack a day since his teenage years.  Coarse lung sounds throughout.         Critical Care Time  Procedures

## 2025-05-26 NOTE — SEPSIS NOTE
"Sepsis Note   Arslan Sanderson 69 y.o. male MRN: [id number]  Unit/Bed#: ED 18 Encounter: [id number]       Initial Sepsis Screening       Row Name 05/26/25 1727 05/26/25 1713             Is the patient's history suggestive of a new or worsening infection? -- Yes (Proceed)  -KAREN       Suspected source of infection -- pneumonia  -KAREN       Indicate SIRS criteria -- Hyperthemia > 38.3C (100.9F) OR Hypothermia <36C (96.8F);Tachycardia > 90 bpm;Tachypnea > 20 resp per min;Leukocytosis (WBC > 80634 IJL) OR Leukopenia (WBC <4000 IJL) OR Bandemia (WBC >10% bands)  -KAREN       Are two or more of the above signs & symptoms of infection both present and new to the patient? -- Yes (Proceed)  -KAREN       Assess for evidence of organ dysfunction: Are any of the below criteria present within 6 hours of suspected infection and SIRS criteria that are NOT considered to be chronic conditions? Lactate > 2.0  -KAREN --       Date of presentation of severe sepsis 05/26/25  -KAREN --       Time of presentation of severe sepsis 1727  -KAREN --       Sepsis Note: Click \"NEXT\" below (NOT \"close\") to generate sepsis note based on above information. -- --                 User Key  (r) = Recorded By, (t) = Taken By, (c) = Cosigned By      Initials Name Provider Type    KAREN Tony MD Physician                   Initial Sepsis Screening       Row Name 05/26/25 1727 05/26/25 1713                Initial Sepsis Assessment    Is the patient's history suggestive of a new or worsening infection? -- Yes (Proceed)  -KAREN       Suspected source of infection -- pneumonia  -KAREN       Indicate SIRS criteria -- Hyperthemia > 38.3C (100.9F) OR Hypothermia <36C (96.8F);Tachycardia > 90 bpm;Tachypnea > 20 resp per min;Leukocytosis (WBC > 94294 IJL) OR Leukopenia (WBC <4000 IJL) OR Bandemia (WBC >10% bands)  -KAREN       Are two or more of the above signs & symptoms of infection both present and new to the patient? -- Yes (Proceed)  -KAREN          If the answer is yes to both above " questions, suspicion of sepsis is present. Proceed with algorithm to determine if severe sepsis or septic shock criteria are met.    Assess for evidence of organ dysfunction: Are any of the below criteria present within 6 hours of suspected infection and SIRS criteria that are NOT considered to be chronic conditions? Lactate > 2.0  -KAREN --          Based on the above information, the patient meets criteria for SEVERE sepsis. CALL A SEPSIS ALERT. Use sepsis order set.     Date of presentation of severe sepsis 05/26/25  -KAREN --       Time of presentation of severe sepsis 1727 -KAREN --                 User Key  (r) = Recorded By, (t) = Taken By, (c) = Cosigned By      Initials Name Provider Type    KAREN Arslan Tony MD Physician                         There is no height or weight on file to calculate BMI.  Wt Readings from Last 1 Encounters:   03/13/25 55.3 kg (122 lb)        Ideal body weight: 68.4 kg (150 lb 12.7 oz)  Fluids and antibiotics ordered.  Concern for pneumonia.

## 2025-05-26 NOTE — RESPIRATORY THERAPY NOTE
RT Protocol Note  Arslan Sanderson 69 y.o. male MRN: [id number]  Unit/Bed#: -01 Encounter: [id number]    Assessment    Principal Problem:    Sepsis due to pneumonia (HCC)  Active Problems:    Coronary artery disease involving native coronary artery of native heart without angina pectoris    Dyslipidemia    Chronic pain disorder    Lumbar disc disease with radiculopathy    Prostate cancer (HCC)    Tobacco use      Home Pulmonary Medications:  none       Past Medical History[1]  Social History[2]    Subjective         Objective    Physical Exam:   Assessment Type: Assess only  General Appearance: Awake, Alert  Respiratory Pattern: Normal  Chest Assessment: Chest expansion symmetrical  R Breath Sounds: Clear  L Breath Sounds: Crackles (LLL crackles)    Vitals:  Blood pressure 103/65, pulse 77, temperature 99.6 °F (37.6 °C), temperature source Temporal, resp. rate 18, SpO2 95%.          Imaging and other studies: Results Review Statement: I reviewed radiology reports from this admission including: chest xray.          Plan    Respiratory Plan: Discontinue Protocol        Resp Comments: (P) Pt with hx of emphysema, current smoker, no home medication or O2, seen today on 2lnc. No sob at this time, c/o cough. LLL crackles heard, no wheezing. Pt admitted with spsis likely due to pneumonia. No bronchodilator therpay indicated at this time.        [1]   Past Medical History:  Diagnosis Date    BCC (basal cell carcinoma)     in past left side of nose    Cancer (HCC) 1/2019    Chronic pain disorder     low back and down rle    Coronary artery disease     cabg x 1 2006    Dyslipidemia     Hyperlipidemia     Idiopathic hypotension     Malignant neoplasm prostate (HCC)    [2]   Social History  Socioeconomic History    Marital status: /Civil Union   Tobacco Use    Smoking status: Every Day     Current packs/day: 0.50     Average packs/day: 0.8 packs/day for 31.4 years (26.2 ttl pk-yrs)     Types: Cigarettes     Start date:  1994    Smokeless tobacco: Never   Vaping Use    Vaping status: Never Used   Substance and Sexual Activity    Alcohol use: Not Currently    Drug use: Never    Sexual activity: Not Currently     Partners: Female     Birth control/protection: None     Social Drivers of Health     Financial Resource Strain: Low Risk  (1/12/2024)    Overall Financial Resource Strain (CARDIA)     Difficulty of Paying Living Expenses: Not very hard   Food Insecurity: No Food Insecurity (1/30/2025)    Nursing - Inadequate Food Risk Classification     Worried About Running Out of Food in the Last Year: Never true     Ran Out of Food in the Last Year: Never true   Transportation Needs: No Transportation Needs (1/30/2025)    PRAPARE - Transportation     Lack of Transportation (Medical): No     Lack of Transportation (Non-Medical): No   Housing Stability: Low Risk  (1/30/2025)    Housing Stability Vital Sign     Unable to Pay for Housing in the Last Year: No     Number of Times Moved in the Last Year: 0     Homeless in the Last Year: No

## 2025-05-26 NOTE — H&P
H&P - Hospitalist   Name: Arslan Sanderson 69 y.o. male I MRN: [id number]  Unit/Bed#: ED 18 I Date of Admission: 5/26/2025   Date of Service: 5/26/2025 I Hospital Day: 0     Assessment & Plan  Sepsis due to pneumonia (HCC)  Patient presenting with 4 to 5 days of congestion, fever chills and cough.  His chest x-ray shows questionable patchy right lower lobe infiltrates however official read pending.  Procalcitonin 0.66 with an elevated white count and fevers.  He is meeting sepsis criteria with a likely source of pneumonia.  He received cefepime and vancomycin in the ER  Obtain strep antigen  Trend WBC, fever, procalcitonin  Await official read of CXR  Though he is meeting sepsis criteria, he has no factors that would suggest broad-spectrum antibiotics such as immunosuppression or recent hospitalization.  Will narrow to ceftriaxone and trend as above recovery  Coronary artery disease involving native coronary artery of native heart without angina pectoris  Continue aspirin, statin  Dyslipidemia  Continue statin  Chronic pain disorder  Continue pain regimen with ibuprofen, Tylenol, Robaxin, tramadol as needed.  Continue Cymbalta  Lumbar disc disease with radiculopathy  See chronic pain disorder.  He follows with our pain team and is on a regimen.  He completed radiofrequency ablations with some success.  Prostate cancer (HCC)  His PSA remains undetectable.  This is checked annually  Tobacco use  Cessation counseling at bedside.  Nicotine replacement therapy ordered      VTE Pharmacologic Prophylaxis:   Moderate Risk (Score 3-4) - Pharmacological DVT Prophylaxis Ordered: enoxaparin (Lovenox).  Code Status: Full code  Discussion with family: Updated  (wife) via phone.    Anticipated Length of Stay: Patient will be admitted on an inpatient basis with an anticipated length of stay of greater than 2 midnights secondary to sepsis secondary to pneumonia.    History of Present Illness   Chief Complaint: Cough,  shortness of breath, fever    Arslan Sanderson is a 69 y.o. male with a PMH of coronary artery disease, dyslipidemia, chronic pain disorder, lumbar disc disease with radiculopathy, prostate cancer tobacco use who presents with complaints of cough, shortness of breath, and fever for 4 to 5 days.  Patient denies any known sick contacts.  Patient reports that his symptoms started Wednesday.  He tried taking Tylenol as needed for fever, nasal spray, and Robitussin over-the-counter for cough with no relief, so came to the ED for further evaluation.  Patient met sepsis criteria on arrival with fever, tachycardia, tachypnea, and leukocytosis with suspicion for pneumonia.  X-ray shows questionable patchy right lower lobe infiltrate however official read is pending.  Procalcitonin was 0.66 patient received 2 L IV fluid in the ED and received a dose of cefepime and vancomycin.  Patient will continue on ceftriaxone on the [facility] floor.  He is being admitted to [facility] service for further management.    Review of Systems   Constitutional:  Positive for chills and fever.   HENT:  Positive for congestion.    Eyes: Negative.    Respiratory:  Positive for cough and shortness of breath.    Cardiovascular: Negative.  Negative for chest pain and palpitations.   Gastrointestinal: Negative.  Negative for abdominal pain, constipation, diarrhea, nausea and vomiting.   Endocrine: Negative.    Genitourinary:  Negative for dysuria, frequency, hematuria and urgency.   Musculoskeletal: Negative.    Skin: Negative.    Allergic/Immunologic: Negative.    Neurological:  Positive for light-headedness. Negative for facial asymmetry and headaches.   Hematological: Negative.    Psychiatric/Behavioral: Negative.         Historical Information   Past Medical History[1]  Past Surgical History[2]  Social History[3]  E-Cigarette/Vaping    E-Cigarette Use Never User      E-Cigarette/Vaping Substances     Family History[4]  Social History:  Marital Status:  /Civil Union   Occupation: Retired worked for [employer]  Patient Pre-hospital Living Situation: Home  Patient Pre-hospital Level of Mobility: walks  Patient Pre-hospital Diet Restrictions: None    Meds/Allergies   I have reviewed home medications with patient personally.  Prior to Admission medications    Medication Sig Start Date End Date Taking? Authorizing Provider   acetaminophen (TYLENOL) 500 mg tablet Take 500 mg by mouth Three times a day 1/30/20   Historical Provider, MD   aspirin (ECOTRIN LOW STRENGTH) 81 mg EC tablet Take 81 mg by mouth daily    Historical Provider, MD   DULoxetine (CYMBALTA) 60 mg delayed release capsule Take 1 capsule (60 mg total) by mouth daily 2/4/25   Jose Ansari DO   gabapentin (NEURONTIN) 600 MG tablet Take 1 tablet (600 mg total) by mouth 4 (four) times a day 3/13/25 6/11/25  Barbara Kocher, CRNP   ibuprofen (MOTRIN) 200 mg tablet Take by mouth every 6 (six) hours as needed for mild pain    Historical Provider, MD   Melatonin 10 MG TABS Take by mouth    Historical Provider, MD   methocarbamol (ROBAXIN) 500 mg tablet Take 1 tablet (500 mg total) by mouth 2 (two) times a day 3/13/25   Barbara Kocher, CRNP   rosuvastatin (CRESTOR) 20 MG tablet Take 1 tablet (20 mg total) by mouth daily 2/6/25   Grant Ahmadi MD   traMADol (ULTRAM) 50 mg tablet Take 1 tablet (50 mg total) by mouth 3 (three) times a day as needed for moderate pain or severe pain 3/13/25 6/11/25  Barbara Kocher, CRNP     Allergies   Allergen Reactions    Atorvastatin        Objective :  Temp:  [101.7 °F (38.7 °C)] 101.7 °F (38.7 °C)  HR:  [] 81  BP: (108-156)/(55-72) 108/58  Resp:  [15-22] 18  SpO2:  [92 %-97 %] 96 %  O2 Device: Nasal cannula  Nasal Cannula O2 Flow Rate (L/min):  [2 L/min] 2 L/min    Physical Exam  Vitals reviewed.   Constitutional:       General: He is not in acute distress.     Appearance: He is well-developed.   HENT:      Head: Normocephalic and  atraumatic.     Cardiovascular:      Rate and Rhythm: Normal rate and regular rhythm.      Heart sounds: No murmur heard.  Pulmonary:      Effort: Pulmonary effort is normal. No respiratory distress.      Breath sounds: No wheezing.      Comments: Crackles left base on auscultation, nonlabored respirations on 2 L, occasional moist cough  Abdominal:      General: There is no distension.      Palpations: Abdomen is soft.      Tenderness: There is no abdominal tenderness. There is no guarding.     Musculoskeletal:         General: No swelling.     Skin:     General: Skin is warm and dry.      Capillary Refill: Capillary refill takes less than 2 seconds.     Neurological:      General: No focal deficit present.      Mental Status: He is alert and oriented to person, place, and time. Mental status is at baseline.     Psychiatric:         Mood and Affect: Mood normal.         Behavior: Behavior normal.         Thought Content: Thought content normal.         Judgment: Judgment normal.          Lines/Drains:              Lab Results: I have reviewed the following results:  Results from last 7 days   Lab Units 05/26/25  1654   WBC Thousand/uL 18.89*   HEMOGLOBIN g/dL 14.2   HEMATOCRIT % 44.0   PLATELETS Thousands/uL 208   SEGS PCT % 79*   LYMPHO PCT % 12*   MONO PCT % 9   EOS PCT % 0     Results from last 7 days   Lab Units 05/26/25  1654   SODIUM mmol/L 135   POTASSIUM mmol/L 4.0   CHLORIDE mmol/L 100   CO2 mmol/L 24   BUN mg/dL 18   CREATININE mg/dL 0.97   ANION GAP mmol/L 11   CALCIUM mg/dL 9.5   ALBUMIN g/dL 4.2   TOTAL BILIRUBIN mg/dL 0.84   ALK PHOS U/L 69   ALT U/L 9   AST U/L 11*   GLUCOSE RANDOM mg/dL 105     Results from last 7 days   Lab Units 05/26/25  1654   INR  1.09         Lab Results   Component Value Date    HGBA1C 5.9 (H) 01/31/2025    HGBA1C 5.7 (H) 07/16/2024    HGBA1C 5.7 (H) 01/09/2024     Results from last 7 days   Lab Units 05/26/25  1654   LACTIC ACID mmol/L 2.3*   PROCALCITONIN ng/ml 0.66*        Imaging Results Review: I personally reviewed the following image studies in PACS and associated radiology reports: chest xray. My interpretation of the radiology images/reports is: Questionable right middle lobe infiltrate.  Other Study Results Review: EKG was personally reviewed and my interpretation is: NSR. HR 99..    Administrative Statements   I have spent a total time of 55 minutes in caring for this patient on the day of the visit/encounter including Diagnostic results, Patient and family education, Impressions, Counseling / Coordination of care, Documenting in the medical record, Reviewing/placing orders in the medical record (including tests, medications, and/or procedures), Obtaining or reviewing history  , and Communicating with other healthcare professionals .    ** Please Note: This note has been constructed using a voice recognition system. **         [1]   Past Medical History:  Diagnosis Date    BCC (basal cell carcinoma)     in past left side of nose    Cancer (HCC) 1/2019    Chronic pain disorder     low back and down rle    Coronary artery disease     cabg x 1 2006    Dyslipidemia     Hyperlipidemia     Idiopathic hypotension     Malignant neoplasm prostate (HCC)    [2]   Past Surgical History:  Procedure Laterality Date    COLONOSCOPY      CORONARY ARTERY BYPASS GRAFT  07/10/2006    LIMA to the diagonal artery    INGUINAL HERNIA REPAIR Right 1996    LAMINECTOMY  1/2020    CA LAPS SURG [id number] RPBIC RAD W/NRV SPARING ROBOT N/A 04/22/2019    Procedure: PROSTATECTOMY RADICAL W/ROBOTICS, B/L PELVIC NODE DISSECT;  Surgeon: Taran Adam MD;  Location: [facility];  Service: Urology    SKIN CANCER EXCISION  2016    left side nose    SPINE SURGERY  1/2020    THORACIC SPINE SURGERY      T3-T4, removal of an arachnoid cyst.    US GUIDED PROSTATE BIOPSY  02/28/2019   [3]   Social History  Tobacco Use    Smoking status: Every Day     Current packs/day: 0.50     Average packs/day: 0.8 packs/day for  31.4 years (26.2 ttl pk-yrs)     Types: Cigarettes     Start date: 1994    Smokeless tobacco: Never   Vaping Use    Vaping status: Never Used   Substance and Sexual Activity    Alcohol use: Not Currently    Drug use: Never    Sexual activity: Not Currently     Partners: Female     Birth control/protection: None   [4]   Family History  Problem Relation Name Age of Onset    Cancer Father Arslan         bladder    Lung cancer Father Arslan         metastatic

## 2025-05-26 NOTE — ED PROVIDER NOTES
ED Disposition       None          Assessment & Plan   {Hyperlinks  Risk Stratification - NIHSS - HEART SCORE - Fill out sepsis note and make sure you call [id number] if severe or septic shock:[id number]}    Medical Decision Making    ED Course as of 05/26/25 1739   Mon May 26, 2025   1700 Patient seen, evaluated, examined, chart reviewed, after presentation from PGY 3 EM resident, Dr. Tony, sepsis alert initiated as per protocol by RN staff, this is a frail 69-year-old male presents with a 5 to 6-day history of worsening shortness of breath, cough, productive sputum green to yellow in color, currently febrile, patient's been taking Tylenol twice daily for the fever reported at home since the onset illness.  No hemoptysis, no recent travel inside the geographical area, prior CABG at [facility] in the remote past: Single-vessel CABG LIMA to diagonal 2006, history of dyslipidemia, tobacco abuse at least 1 pack a day since his teenage years.  Coarse lung sounds throughout.       Medications - No data to display    ED Risk Strat Scores                    (ISAR) Identification of Seniors at Risk  Before the illness or injury that brought you to the Emergency, did you need someone to help you on a regular basis?: 0  In the last 24 hours, have you needed more help than usual?: 0  Have you been hospitalized for one or more nights during the past 6 months?: 0  In general, do you see well?: 0  In general, do you have serious problems with your memory?: 0  Do you take more than three different medications every day?: 0  ISAR Score: 0            SBIRT 20yo+      Flowsheet Row Most Recent Value   Initial Alcohol Screen: US AUDIT-C     1. How often do you have a drink containing alcohol? 0 Filed at: 05/26/2025 1643   2. How many drinks containing alcohol do you have on a typical day you are drinking?  0 Filed at: 05/26/2025 3363   3a. Male UNDER 65: How often do you have five or more drinks on one occasion? 0 Filed at: 05/26/2025 7278    3b. FEMALE Any Age, or MALE 65+: How often do you have 4 or more drinks on one occassion? 0 Filed at: 05/26/2025 1643   Audit-C Score 0 Filed at: 05/26/2025 1643   DOROTHY: How many times in the past year have you...    Used an illegal drug or used a prescription medication for non-medical reasons? Never Filed at: 05/26/2025 1643                            History of Present Illness   {Hyperlinks  History (Med, Surg, Fam, Social) - Current Medications - Allergies  :[id number]}    Chief Complaint   Patient presents with   • Cough   • Fever   • Shortness of Breath     Worse with exertion       Past Medical History[1]   Past Surgical History[2]   Family History[3]   Social History[4]   E-Cigarette/Vaping   • E-Cigarette Use Never User       E-Cigarette/Vaping Substances      I have reviewed and agree with the history as documented.     HPI    Review of Systems        Objective   {Hyperlinks  Historical Vitals - Historical Labs - Chart Review/Microbiology - Last Echo - Code Status  :[id number]}    ED Triage Vitals [05/26/25 1640]   Temperature Pulse Blood Pressure Respirations SpO2 Patient Position - Orthostatic VS   (!) 101.7 °F (38.7 °C) 101 156/72 22 92 % Sitting      Temp Source Heart Rate Source BP Location FiO2 (%) Pain Score    Temporal Monitor Left arm -- No Pain      Vitals      Date and Time Temp Pulse SpO2 Resp BP Pain Score FACES Pain Rating User   05/26/25 1640 101.7 °F (38.7 °C) tylenol at 10 this morning 101 92 % 22 156/72 No Pain -- NAD            Physical Exam    Results Reviewed       None            No orders to display       Procedures    ED Medication and Procedure Management   Prior to Admission Medications   Prescriptions Last Dose Informant Patient Reported? Taking?   DULoxetine (CYMBALTA) 60 mg delayed release capsule  Self No No   Sig: Take 1 capsule (60 mg total) by mouth daily   Melatonin 10 MG TABS  Self Yes No   Sig: Take by mouth   acetaminophen (TYLENOL) 500 mg tablet  Self Yes No   Sig:  Take 500 mg by mouth Three times a day   aspirin (ECOTRIN LOW STRENGTH) 81 mg EC tablet  Self Yes No   Sig: Take 81 mg by mouth daily   gabapentin (NEURONTIN) 600 MG tablet   No No   Sig: Take 1 tablet (600 mg total) by mouth 4 (four) times a day   ibuprofen (MOTRIN) 200 mg tablet  Self Yes No   Sig: Take by mouth every 6 (six) hours as needed for mild pain   methocarbamol (ROBAXIN) 500 mg tablet   No No   Sig: Take 1 tablet (500 mg total) by mouth 2 (two) times a day   rosuvastatin (CRESTOR) 20 MG tablet  Self No No   Sig: Take 1 tablet (20 mg total) by mouth daily   traMADol (ULTRAM) 50 mg tablet   No No   Sig: Take 1 tablet (50 mg total) by mouth 3 (three) times a day as needed for moderate pain or severe pain      Facility-Administered Medications: None     Patient's Medications   Discharge Prescriptions    No medications on file     No discharge procedures on file.  ED SEPSIS DOCUMENTATION                [1]  Past Medical History:  Diagnosis Date   • BCC (basal cell carcinoma)     in past left side of nose   • Cancer (HCC) 1/2019   • Chronic pain disorder     low back and down rle   • Coronary artery disease     cabg x 1 2006   • Dyslipidemia    • Hyperlipidemia    • Idiopathic hypotension    • Malignant neoplasm prostate (HCC)    [2]  Past Surgical History:  Procedure Laterality Date   • COLONOSCOPY     • CORONARY ARTERY BYPASS GRAFT  07/10/2006    LIMA to the diagonal artery   • INGUINAL HERNIA REPAIR Right 1996   • LAMINECTOMY  1/2020   • MO LAPS SURG [id number] RPBIC RAD W/NRV SPARING ROBOT N/A 04/22/2019    Procedure: PROSTATECTOMY RADICAL W/ROBOTICS, B/L PELVIC NODE DISSECT;  Surgeon: Taran Adam MD;  Location: [facility];  Service: Urology   • SKIN CANCER EXCISION  2016    left side nose   • SPINE SURGERY  1/2020   • THORACIC SPINE SURGERY      T3-T4, removal of an arachnoid cyst.   • US GUIDED PROSTATE BIOPSY  02/28/2019   [3]  Family History  Problem Relation Name Age of Onset   • Cancer Father Arslan          bladder   • Lung cancer Father Arslan         metastatic   [4]  Social History  Tobacco Use   • Smoking status: Every Day     Current packs/day: 0.50     Average packs/day: 0.8 packs/day for 31.4 years (26.2 ttl pk-yrs)     Types: Cigarettes     Start date: 1994   • Smokeless tobacco: Never   Vaping Use   • Vaping status: Never Used   Substance Use Topics   • Alcohol use: Not Currently   • Drug use: Never

## 2025-05-26 NOTE — ASSESSMENT & PLAN NOTE
See chronic pain disorder.  He follows with our pain team and is on a regimen.  He completed radiofrequency ablations with some success.

## 2025-05-27 LAB
ALBUMIN SERPL BCG-MCNC: 3.1 G/DL (ref 3.5–5)
ALP SERPL-CCNC: 48 U/L (ref 34–104)
ALT SERPL W P-5'-P-CCNC: 6 U/L (ref 7–52)
ANION GAP SERPL CALCULATED.3IONS-SCNC: 3 MMOL/L (ref 4–13)
AST SERPL W P-5'-P-CCNC: 9 U/L (ref 13–39)
ATRIAL RATE: 99 BPM
BACTERIA UR QL AUTO: NORMAL /HPF
BASOPHILS # BLD AUTO: 0.05 THOUSANDS/ÂΜL (ref 0–0.1)
BASOPHILS NFR BLD AUTO: 0 % (ref 0–1)
BILIRUB SERPL-MCNC: 0.52 MG/DL (ref 0.2–1)
BILIRUB UR QL STRIP: NEGATIVE
BUN SERPL-MCNC: 15 MG/DL (ref 5–25)
CALCIUM ALBUM COR SERPL-MCNC: 8.7 MG/DL (ref 8.3–10.1)
CALCIUM SERPL-MCNC: 8 MG/DL (ref 8.4–10.2)
CHLORIDE SERPL-SCNC: 108 MMOL/L (ref 96–108)
CLARITY UR: CLEAR
CO2 SERPL-SCNC: 25 MMOL/L (ref 21–32)
COLOR UR: YELLOW
CREAT SERPL-MCNC: 0.83 MG/DL (ref 0.6–1.3)
EOSINOPHIL # BLD AUTO: 0.08 THOUSAND/ÂΜL (ref 0–0.61)
EOSINOPHIL NFR BLD AUTO: 1 % (ref 0–6)
ERYTHROCYTE [DISTWIDTH] IN BLOOD BY AUTOMATED COUNT: 13.2 % (ref 11.6–15.1)
GFR SERPL CREATININE-BSD FRML MDRD: 89 ML/MIN/1.73SQ M
GLUCOSE SERPL-MCNC: 92 MG/DL (ref 65–140)
GLUCOSE UR STRIP-MCNC: NEGATIVE MG/DL
HCT VFR BLD AUTO: 35.2 % (ref 36.5–49.3)
HGB BLD-MCNC: 11.4 G/DL (ref 12–17)
HGB UR QL STRIP.AUTO: ABNORMAL
IMM GRANULOCYTES # BLD AUTO: 0.06 THOUSAND/UL (ref 0–0.2)
IMM GRANULOCYTES NFR BLD AUTO: 0 % (ref 0–2)
KETONES UR STRIP-MCNC: ABNORMAL MG/DL
LEUKOCYTE ESTERASE UR QL STRIP: NEGATIVE
LYMPHOCYTES # BLD AUTO: 1.9 THOUSANDS/ÂΜL (ref 0.6–4.47)
LYMPHOCYTES NFR BLD AUTO: 14 % (ref 14–44)
MCH RBC QN AUTO: 30.2 PG (ref 26.8–34.3)
MCHC RBC AUTO-ENTMCNC: 32.4 G/DL (ref 31.4–37.4)
MCV RBC AUTO: 93 FL (ref 82–98)
MONOCYTES # BLD AUTO: 1.2 THOUSAND/ÂΜL (ref 0.17–1.22)
MONOCYTES NFR BLD AUTO: 9 % (ref 4–12)
NEUTROPHILS # BLD AUTO: 10.16 THOUSANDS/ÂΜL (ref 1.85–7.62)
NEUTS SEG NFR BLD AUTO: 76 % (ref 43–75)
NITRITE UR QL STRIP: NEGATIVE
NON-SQ EPI CELLS URNS QL MICRO: NORMAL /HPF
NRBC BLD AUTO-RTO: 0 /100 WBCS
P AXIS: 87 DEGREES
PH UR STRIP.AUTO: 6 [PH]
PLATELET # BLD AUTO: 166 THOUSANDS/UL (ref 149–390)
PMV BLD AUTO: 10.5 FL (ref 8.9–12.7)
POTASSIUM SERPL-SCNC: 4.1 MMOL/L (ref 3.5–5.3)
PR INTERVAL: 148 MS
PROCALCITONIN SERPL-MCNC: 0.69 NG/ML
PROT SERPL-MCNC: 5.3 G/DL (ref 6.4–8.4)
PROT UR STRIP-MCNC: ABNORMAL MG/DL
QRS AXIS: 56 DEGREES
QRSD INTERVAL: 82 MS
QT INTERVAL: 320 MS
QTC INTERVAL: 410 MS
RBC # BLD AUTO: 3.77 MILLION/UL (ref 3.88–5.62)
RBC #/AREA URNS AUTO: NORMAL /HPF
S PNEUM AG UR QL: NEGATIVE
SODIUM SERPL-SCNC: 136 MMOL/L (ref 135–147)
SP GR UR STRIP.AUTO: 1.02
T WAVE AXIS: 78 DEGREES
UROBILINOGEN UR QL STRIP.AUTO: 1 E.U./DL
VENTRICULAR RATE: 99 BPM
WBC # BLD AUTO: 13.45 THOUSAND/UL (ref 4.31–10.16)
WBC #/AREA URNS AUTO: NORMAL /HPF

## 2025-05-27 PROCEDURE — 85025 COMPLETE CBC W/AUTO DIFF WBC: CPT | Performed by: INTERNAL MEDICINE

## 2025-05-27 PROCEDURE — 93010 ELECTROCARDIOGRAM REPORT: CPT | Performed by: INTERNAL MEDICINE

## 2025-05-27 PROCEDURE — 84145 PROCALCITONIN (PCT): CPT | Performed by: INTERNAL MEDICINE

## 2025-05-27 PROCEDURE — 80053 COMPREHEN METABOLIC PANEL: CPT | Performed by: INTERNAL MEDICINE

## 2025-05-27 PROCEDURE — 87449 NOS EACH ORGANISM AG IA: CPT | Performed by: INTERNAL MEDICINE

## 2025-05-27 PROCEDURE — 81001 URINALYSIS AUTO W/SCOPE: CPT | Performed by: EMERGENCY MEDICINE

## 2025-05-27 PROCEDURE — 99232 SBSQ HOSP IP/OBS MODERATE 35: CPT

## 2025-05-27 RX ORDER — TRAMADOL HYDROCHLORIDE 50 MG/1
50 TABLET ORAL 3 TIMES DAILY PRN
Status: DISCONTINUED | OUTPATIENT
Start: 2025-05-27 | End: 2025-05-29 | Stop reason: HOSPADM

## 2025-05-27 RX ORDER — IBUPROFEN 600 MG/1
600 TABLET, FILM COATED ORAL EVERY 6 HOURS PRN
Status: DISCONTINUED | OUTPATIENT
Start: 2025-05-27 | End: 2025-05-29 | Stop reason: HOSPADM

## 2025-05-27 RX ORDER — SODIUM CHLORIDE 9 MG/ML
100 INJECTION, SOLUTION INTRAVENOUS CONTINUOUS
Status: DISPENSED | OUTPATIENT
Start: 2025-05-27 | End: 2025-05-28

## 2025-05-27 RX ADMIN — ASPIRIN 81 MG: 81 TABLET, COATED ORAL at 08:12

## 2025-05-27 RX ADMIN — SODIUM CHLORIDE 100 ML/HR: 0.9 INJECTION, SOLUTION INTRAVENOUS at 09:53

## 2025-05-27 RX ADMIN — ATORVASTATIN CALCIUM 40 MG: 40 TABLET, FILM COATED ORAL at 17:06

## 2025-05-27 RX ADMIN — CEFTRIAXONE SODIUM 2000 MG: 10 INJECTION, POWDER, FOR SOLUTION INTRAVENOUS at 05:03

## 2025-05-27 RX ADMIN — GABAPENTIN 600 MG: 300 CAPSULE ORAL at 22:11

## 2025-05-27 RX ADMIN — ENOXAPARIN SODIUM 40 MG: 40 INJECTION SUBCUTANEOUS at 08:12

## 2025-05-27 RX ADMIN — SODIUM CHLORIDE 500 ML: 0.9 INJECTION, SOLUTION INTRAVENOUS at 00:40

## 2025-05-27 RX ADMIN — SODIUM CHLORIDE 100 ML/HR: 0.9 INJECTION, SOLUTION INTRAVENOUS at 20:12

## 2025-05-27 RX ADMIN — DULOXETINE HYDROCHLORIDE 60 MG: 60 CAPSULE, DELAYED RELEASE ORAL at 08:12

## 2025-05-27 RX ADMIN — TRAMADOL HYDROCHLORIDE 50 MG: 50 TABLET, COATED ORAL at 12:57

## 2025-05-27 RX ADMIN — GABAPENTIN 600 MG: 300 CAPSULE ORAL at 08:12

## 2025-05-27 RX ADMIN — GABAPENTIN 600 MG: 300 CAPSULE ORAL at 17:06

## 2025-05-27 RX ADMIN — GUAIFENESIN AND DEXTROMETHORPHAN 10 ML: 100; 10 SYRUP ORAL at 05:03

## 2025-05-27 RX ADMIN — GABAPENTIN 600 MG: 300 CAPSULE ORAL at 12:10

## 2025-05-27 NOTE — ASSESSMENT & PLAN NOTE
Patient presenting with 4 to 5 days of congestion, fever chills and cough.  His chest x-ray shows questionable patchy right lower lobe infiltrates however official read pending.  Procalcitonin 0.66 with an elevated white count and fevers.  He is meeting sepsis criteria with a likely source of pneumonia.  He received cefepime and vancomycin in the ER    Plan:  CXR reveals: Mild bibasilar opacity suspicious for pneumonia.   Procal 0.66->0.69  Continue with IV Rocephin  Monitor CBC, trend fever curve  Strep antigen negative  Blood cultures pending  Continue IVF as patient with soft Bps  On 2L n/c for comfort, no hypoxia noted this admission  Supportive Care

## 2025-05-27 NOTE — PROGRESS NOTES
Progress Note - Hospitalist   Name: Arslan Sanderson 69 y.o. male I MRN: [id number]  Unit/Bed#: -01 I Date of Admission: 5/26/2025   Date of Service: 5/27/2025 I Hospital Day: 1    Assessment & Plan  Sepsis due to pneumonia (HCC)  Patient presenting with 4 to 5 days of congestion, fever chills and cough.  His chest x-ray shows questionable patchy right lower lobe infiltrates however official read pending.  Procalcitonin 0.66 with an elevated white count and fevers.  He is meeting sepsis criteria with a likely source of pneumonia.  He received cefepime and vancomycin in the ER    Plan:  CXR reveals: Mild bibasilar opacity suspicious for pneumonia.   Procal 0.66->0.69  Continue with IV Rocephin  Monitor CBC, trend fever curve  Strep antigen negative  Blood cultures pending  Continue IVF as patient with soft Bps  On 2L n/c for comfort, no hypoxia noted this admission  Supportive Care  Coronary artery disease involving native coronary artery of native heart without angina pectoris  Continue aspirin, statin  Dyslipidemia  Continue statin  Chronic pain disorder  Continue pain regimen with ibuprofen, Tylenol, Robaxin, tramadol as needed.  Continue Cymbalta  Lumbar disc disease with radiculopathy  See chronic pain disorder.  He follows with our pain team and is on a regimen.  He completed radiofrequency ablations with some success.  Prostate cancer (HCC)  His PSA remains undetectable.  This is checked annually  Tobacco use  Cessation counseling at bedside.  Nicotine replacement therapy ordered    VTE Pharmacologic Prophylaxis: VTE Score: 3 Moderate Risk (Score 3-4) - Pharmacological DVT Prophylaxis Ordered: enoxaparin (Lovenox).    Mobility:   Basic Mobility Inpatient Raw Score: 22  JH-HLM Goal: 7: Walk 25 feet or more  JH-HLM Achieved: 7: Walk 25 feet or more  JH-HLM Goal achieved. Continue to encourage appropriate mobility.    Patient Centered Rounds: I performed bedside rounds with nursing staff today.   Discussions with  Specialists or Other Care Team Provider:       Education and Discussions with Family / Patient: Attempted to update  (wife) via phone. Unable to contact.    Current Length of Stay: 1 day(s)  Current Patient Status: Inpatient   Certification Statement: The patient will continue to require additional inpatient hospital stay due to need for IV antibiotics  Discharge Plan: Anticipate discharge in 24-48 hrs to home.    Code Status: Level 1 - Full Code    Subjective   Patient seen and examined resting comfortably in bed.  Reports some ongoing shortness of breath and fatigue.  No acute events noted overnight.  Reinforced the importance of smoking cessation.  Patient in agreement with current plan of care.     Objective :  Temp:  [98.1 °F (36.7 °C)-101.7 °F (38.7 °C)] 98.9 °F (37.2 °C)  HR:  [] 58  BP: ()/(48-72) 107/55  Resp:  [15-22] 20  SpO2:  [92 %-98 %] 98 %  O2 Device: Nasal cannula  Nasal Cannula O2 Flow Rate (L/min):  [2 L/min] 2 L/min    Body mass index is 18.55 kg/m².     Input and Output Summary (last 24 hours):     Intake/Output Summary (Last 24 hours) at 5/27/2025 1523  Last data filed at 5/27/2025 1307  Gross per 24 hour   Intake 3468.33 ml   Output 1200 ml   Net 2268.33 ml       Physical Exam  Vitals and nursing note reviewed.   Constitutional:       General: He is not in acute distress.  HENT:      Head: Normocephalic.      Mouth/Throat:      Mouth: Mucous membranes are moist.     Eyes:      Pupils: Pupils are equal, round, and reactive to light.       Cardiovascular:      Rate and Rhythm: Normal rate.      Pulses: Normal pulses.   Pulmonary:      Effort: Pulmonary effort is normal.      Breath sounds: Rhonchi present.   Abdominal:      General: There is no distension.      Palpations: Abdomen is soft.      Tenderness: There is no abdominal tenderness.     Musculoskeletal:         General: No swelling.     Skin:     General: Skin is warm.     Neurological:      Mental Status: He is  alert and oriented to person, place, and time.      Motor: Weakness present.     Psychiatric:         Mood and Affect: Mood normal.         Behavior: Behavior normal.           Lines/Drains:                     Lab Results: I have reviewed the following results:   Results from last 7 days   Lab Units 05/27/25  0456   WBC Thousand/uL 13.45*   HEMOGLOBIN g/dL 11.4*   HEMATOCRIT % 35.2*   PLATELETS Thousands/uL 166   SEGS PCT % 76*   LYMPHO PCT % 14   MONO PCT % 9   EOS PCT % 1     Results from last 7 days   Lab Units 05/27/25  0456   SODIUM mmol/L 136   POTASSIUM mmol/L 4.1   CHLORIDE mmol/L 108   CO2 mmol/L 25   BUN mg/dL 15   CREATININE mg/dL 0.83   ANION GAP mmol/L 3*   CALCIUM mg/dL 8.0*   ALBUMIN g/dL 3.1*   TOTAL BILIRUBIN mg/dL 0.52   ALK PHOS U/L 48   ALT U/L 6*   AST U/L 9*   GLUCOSE RANDOM mg/dL 92     Results from last 7 days   Lab Units 05/26/25  1654   INR  1.09             Results from last 7 days   Lab Units 05/27/25 0456 05/26/25 2004 05/26/25  1654   LACTIC ACID mmol/L  --  0.8 2.3*   PROCALCITONIN ng/ml 0.69*  --  0.66*       Recent Cultures (last 7 days):   Results from last 7 days   Lab Units 05/26/25  1654   BLOOD CULTURE  Received in Microbiology Lab. Culture in Progress.  Received in Microbiology Lab. Culture in Progress.       Imaging Results Review: I reviewed radiology reports from this admission including: chest xray.  Other Study Results Review: No additional pertinent studies reviewed.    Last 24 Hours Medication List:     Current Facility-Administered Medications:     acetaminophen (TYLENOL) tablet 975 mg, Q6H PRN    aspirin (ECOTRIN LOW STRENGTH) EC tablet 81 mg, Daily    atorvastatin (LIPITOR) tablet 40 mg, Daily With Dinner    ceftriaxone (ROCEPHIN) 2 g/50 mL in dextrose IVPB, Q24H, Last Rate: 100 mL/hr at 05/27/25 0600    dextromethorphan-guaiFENesin (ROBITUSSIN DM) oral syrup 10 mL, Q4H PRN    DULoxetine (CYMBALTA) delayed release capsule 60 mg, Daily    enoxaparin (LOVENOX)  subcutaneous injection 40 mg, Daily    gabapentin (NEURONTIN) capsule 600 mg, 4x Daily    ibuprofen (MOTRIN) tablet 600 mg, Q6H PRN    methocarbamol (ROBAXIN) tablet 500 mg, BID PRN    ondansetron (ZOFRAN) injection 4 mg, Q6H PRN    senna (SENOKOT) tablet 8.6 mg, HS PRN    sodium chloride 0.9 % infusion, Continuous, Last Rate: 100 mL/hr (05/27/25 0953)    traMADol (ULTRAM) tablet 50 mg, TID PRN    Administrative Statements   Today, Patient Was Seen By: DOMONIQUE Benedict  I have spent a total time of 40 minutes in caring for this patient on the day of the visit/encounter including Diagnostic results, Prognosis, Risks and benefits of tx options, Instructions for management, Patient and family education, Importance of tx compliance, Risk factor reductions, Impressions, Counseling / Coordination of care, Documenting in the medical record, Reviewing/placing orders in the medical record (including tests, medications, and/or procedures), Obtaining or reviewing history  , and Communicating with other healthcare professionals .    **Please Note: This note may have been constructed using a voice recognition system.**

## 2025-05-27 NOTE — PLAN OF CARE
Problem: PAIN - ADULT  Goal: Verbalizes/displays adequate comfort level or baseline comfort level  Description: Interventions:  - Encourage patient to monitor pain and request assistance  - Assess pain using appropriate pain scale  - Administer analgesics as ordered based on type and severity of pain and evaluate response  - Implement non-pharmacological measures as appropriate and evaluate response  - Consider cultural and social influences on pain and pain management  - Notify physician/advanced practitioner if interventions unsuccessful or patient reports new pain  - Educate patient/family on pain management process including their role and importance of  reporting pain   - Provide non-pharmacologic/complimentary pain relief interventions  Outcome: Progressing     Problem: INFECTION - ADULT  Goal: Absence or prevention of progression during hospitalization  Description: INTERVENTIONS:  - Assess and monitor for signs and symptoms of infection  - Monitor lab/diagnostic results  - Monitor all insertion sites, i.e. indwelling lines, tubes, and drains  - Monitor endotracheal if appropriate and nasal secretions for changes in amount and color  - Mobile appropriate cooling/warming therapies per order  - Administer medications as ordered  - Instruct and encourage patient and family to use good hand hygiene technique  - Identify and instruct in appropriate isolation precautions for identified infection/condition  Outcome: Progressing     Problem: SAFETY ADULT  Goal: Patient will remain free of falls  Description: INTERVENTIONS:  - Educate patient/family on patient safety including physical limitations  - Instruct patient to call for assistance with activity   - Consider consulting OT/PT to assist with strengthening/mobility based on AM PAC & JH-HLM score  - Consult OT/PT to assist with strengthening/mobility   - Keep Call bell within reach  - Keep bed low and locked with side rails adjusted as appropriate  - Keep  care items and personal belongings within reach  - Initiate and maintain comfort rounds  - Make Fall Risk Sign visible to staff  - Offer Toileting in advance of need  - Initiate/Maintain bed alarm  - Obtain necessary fall risk management equipment:   - Apply yellow socks and bracelet for high fall risk patients  - Consider moving patient to room near nurses station  Outcome: Progressing  Goal: Maintain or return to baseline ADL function  Description: INTERVENTIONS:  -  Assess patient's ability to carry out ADLs; assess patient's baseline for ADL function and identify physical deficits which impact ability to perform ADLs (bathing, care of mouth/teeth, toileting, grooming, dressing, etc.)  - Assess/evaluate cause of self-care deficits   - Assess range of motion  - Assess patient's mobility; develop plan if impaired  - Assess patient's need for assistive devices and provide as appropriate  - Encourage maximum independence but intervene and supervise when necessary  - Involve family in performance of ADLs  - Assess for home care needs following discharge   - Consider OT consult to assist with ADL evaluation and planning for discharge  - Provide patient education as appropriate  - Monitor functional capacity and physical performance, use of AM PAC & JH-HLM   - Monitor gait, balance and fatigue with ambulation    Outcome: Progressing  Goal: Maintains/Returns to pre admission functional level  Description: INTERVENTIONS:  - Perform AM-PAC 6 Click Basic Mobility/ Daily Activity assessment daily.  - Set and communicate daily mobility goal to care team and patient/family/caregiver.   - Collaborate with rehabilitation services on mobility goals if consulted  - Out of bed to chair   - Out of bed for meals  - Out of bed for toileting  - Record patient progress and toleration of activity level   Outcome: Progressing     Problem: DISCHARGE PLANNING  Goal: Discharge to home or other facility with appropriate  resources  Description: INTERVENTIONS:  - Identify barriers to discharge w/patient and caregiver  - Arrange for needed discharge resources and transportation as appropriate  - Identify discharge learning needs (meds, wound care, etc.)  - Refer to Case Management Department for coordinating discharge planning if the patient needs post-hospital services based on physician/advanced practitioner order or complex needs related to functional status, cognitive ability, or social support system  Outcome: Progressing     Problem: Knowledge Deficit  Goal: Patient/family/caregiver demonstrates understanding of disease process, treatment plan, medications, and discharge instructions  Description: Complete learning assessment and assess knowledge base.  Interventions:  - Provide teaching at level of understanding  - Provide teaching via preferred learning methods  Outcome: Progressing     Problem: RESPIRATORY - ADULT  Goal: Achieves optimal ventilation and oxygenation  Description: INTERVENTIONS:  - Assess for changes in respiratory status  - Assess for changes in mentation and behavior  - Position to facilitate oxygenation and minimize respiratory effort  - Oxygen administered by appropriate delivery if ordered  - Initiate smoking cessation education as indicated  - Encourage broncho-pulmonary hygiene including cough, deep breathe, Incentive Spirometry  - Assess the need for suctioning and aspirate as needed  - Assess and instruct to report SOB or any respiratory difficulty  - Respiratory Therapy support as indicated  Outcome: Progressing

## 2025-05-28 LAB
ANION GAP SERPL CALCULATED.3IONS-SCNC: 6 MMOL/L (ref 4–13)
BUN SERPL-MCNC: 10 MG/DL (ref 5–25)
CALCIUM SERPL-MCNC: 8.4 MG/DL (ref 8.4–10.2)
CHLORIDE SERPL-SCNC: 107 MMOL/L (ref 96–108)
CO2 SERPL-SCNC: 22 MMOL/L (ref 21–32)
CREAT SERPL-MCNC: 0.77 MG/DL (ref 0.6–1.3)
ERYTHROCYTE [DISTWIDTH] IN BLOOD BY AUTOMATED COUNT: 13.1 % (ref 11.6–15.1)
GFR SERPL CREATININE-BSD FRML MDRD: 92 ML/MIN/1.73SQ M
GLUCOSE SERPL-MCNC: 94 MG/DL (ref 65–140)
HCT VFR BLD AUTO: 34.3 % (ref 36.5–49.3)
HGB BLD-MCNC: 11.3 G/DL (ref 12–17)
MCH RBC QN AUTO: 30.7 PG (ref 26.8–34.3)
MCHC RBC AUTO-ENTMCNC: 32.9 G/DL (ref 31.4–37.4)
MCV RBC AUTO: 93 FL (ref 82–98)
PLATELET # BLD AUTO: 180 THOUSANDS/UL (ref 149–390)
PMV BLD AUTO: 10.5 FL (ref 8.9–12.7)
POTASSIUM SERPL-SCNC: 3.8 MMOL/L (ref 3.5–5.3)
RBC # BLD AUTO: 3.68 MILLION/UL (ref 3.88–5.62)
SODIUM SERPL-SCNC: 135 MMOL/L (ref 135–147)
WBC # BLD AUTO: 14.34 THOUSAND/UL (ref 4.31–10.16)

## 2025-05-28 PROCEDURE — 85027 COMPLETE CBC AUTOMATED: CPT

## 2025-05-28 PROCEDURE — 99232 SBSQ HOSP IP/OBS MODERATE 35: CPT

## 2025-05-28 PROCEDURE — 80048 BASIC METABOLIC PNL TOTAL CA: CPT

## 2025-05-28 RX ADMIN — ASPIRIN 81 MG: 81 TABLET, COATED ORAL at 08:43

## 2025-05-28 RX ADMIN — ACETAMINOPHEN 975 MG: 325 TABLET ORAL at 01:24

## 2025-05-28 RX ADMIN — GABAPENTIN 600 MG: 300 CAPSULE ORAL at 08:43

## 2025-05-28 RX ADMIN — DULOXETINE HYDROCHLORIDE 60 MG: 60 CAPSULE, DELAYED RELEASE ORAL at 08:43

## 2025-05-28 RX ADMIN — GUAIFENESIN AND DEXTROMETHORPHAN 10 ML: 100; 10 SYRUP ORAL at 19:24

## 2025-05-28 RX ADMIN — TRAMADOL HYDROCHLORIDE 50 MG: 50 TABLET, COATED ORAL at 19:24

## 2025-05-28 RX ADMIN — CEFTRIAXONE SODIUM 2000 MG: 10 INJECTION, POWDER, FOR SOLUTION INTRAVENOUS at 05:37

## 2025-05-28 RX ADMIN — GABAPENTIN 600 MG: 300 CAPSULE ORAL at 12:13

## 2025-05-28 RX ADMIN — GABAPENTIN 600 MG: 300 CAPSULE ORAL at 17:04

## 2025-05-28 RX ADMIN — ATORVASTATIN CALCIUM 40 MG: 40 TABLET, FILM COATED ORAL at 17:04

## 2025-05-28 RX ADMIN — TRAMADOL HYDROCHLORIDE 50 MG: 50 TABLET, COATED ORAL at 03:17

## 2025-05-28 RX ADMIN — GUAIFENESIN AND DEXTROMETHORPHAN 10 ML: 100; 10 SYRUP ORAL at 01:24

## 2025-05-28 RX ADMIN — GABAPENTIN 600 MG: 300 CAPSULE ORAL at 21:13

## 2025-05-28 RX ADMIN — ENOXAPARIN SODIUM 40 MG: 40 INJECTION SUBCUTANEOUS at 08:43

## 2025-05-28 NOTE — PROGRESS NOTES
Progress Note - Hospitalist   Name: Arslan Sanderson 69 y.o. male I MRN: [id number]  Unit/Bed#: -01 I Date of Admission: 5/26/2025   Date of Service: 5/28/2025 I Hospital Day: 2    Assessment & Plan  Sepsis due to pneumonia (HCC)  Patient presented with 4 to 5 days of congestion, fever chills and cough  Met sepsis criteria on arrival with leukocytosis, fever, pneumonia on imaging  Blood cultures x 2 negative after 24 hours  COVID/flu/RSV negative  Lactic acid 2.3, with IV fluids  Procal 0.66->0.69  Strep antigen negative  Continue Rocephin day 3  Encourage mobility. OOB to chair, IS  CBC a.m.  Coronary artery disease involving native coronary artery of native heart without angina pectoris  Continue aspirin, statin  Dyslipidemia  Continue statin  Chronic pain disorder  Continue pain regimen with ibuprofen, Tylenol, Robaxin, tramadol as needed.  Continue Cymbalta  Lumbar disc disease with radiculopathy  See chronic pain disorder.  He follows with our pain team and is on a regimen.  He completed radiofrequency ablations with some success.  Prostate cancer (HCC)  His PSA remains undetectable.  This is checked annually  Tobacco use  Encourage smoking cessation  Refuses NicoDerm    VTE Pharmacologic Prophylaxis: VTE Score: 3 Moderate Risk (Score 3-4) - Pharmacological DVT Prophylaxis Ordered: enoxaparin (Lovenox).    Mobility:   Basic Mobility Inpatient Raw Score: 22  JH-HLM Goal: 7: Walk 25 feet or more  JH-HLM Achieved: 7: Walk 25 feet or more  JH-HLM Goal achieved. Continue to encourage appropriate mobility.    Patient Centered Rounds: I performed bedside rounds with nursing staff today.   Discussions with Specialists or Other Care Team Provider: Nursing, case management    Education and Discussions with Family / Patient: Updated  (wife) via phone.    Current Length of Stay: 2 day(s)  Current Patient Status: Inpatient   Certification Statement: The patient will continue to require additional inpatient  hospital stay due to treatment of pneumonia with IV antibiotics repeat labs in a.m.  Discharge Plan: Anticipate discharge in 24-48 hrs to home.    Code Status: Level 1 - Full Code    Subjective   Feeling better, no shortness of breath.  Denies dizziness lightheadedness chest pain or palpitations    Objective :  Temp:  [97.9 °F (36.6 °C)-98.9 °F (37.2 °C)] 97.9 °F (36.6 °C)  HR:  [46-58] 46  BP: ()/(51-55) 95/51  Resp:  [16-20] 17  SpO2:  [91 %-96 %] 91 %  O2 Device: None (Room air)    Body mass index is 18.55 kg/m².     Input and Output Summary (last 24 hours):     Intake/Output Summary (Last 24 hours) at 5/28/2025 1130  Last data filed at 5/28/2025 0820  Gross per 24 hour   Intake 1008.33 ml   Output 2125 ml   Net -1116.67 ml       Physical Exam  Vitals reviewed.   Constitutional:       General: He is not in acute distress.     Appearance: He is well-developed.   HENT:      Head: Normocephalic and atraumatic.     Cardiovascular:      Rate and Rhythm: Normal rate and regular rhythm.      Heart sounds: No murmur heard.  Pulmonary:      Effort: Pulmonary effort is normal. No respiratory distress.      Breath sounds: Normal breath sounds. No wheezing or rales.   Abdominal:      General: There is no distension.      Palpations: Abdomen is soft.      Tenderness: There is no abdominal tenderness. There is no guarding.     Musculoskeletal:         General: No swelling. Normal range of motion.     Skin:     General: Skin is warm and dry.      Capillary Refill: Capillary refill takes less than 2 seconds.     Neurological:      General: No focal deficit present.      Mental Status: He is alert and oriented to person, place, and time. Mental status is at baseline.     Psychiatric:         Mood and Affect: Mood normal.         Behavior: Behavior normal.         Thought Content: Thought content normal.         Judgment: Judgment normal.           Lines/Drains:              Lab Results: I have reviewed the following  results:   Results from last 7 days   Lab Units 05/28/25 0448 05/27/25  0456   WBC Thousand/uL 14.34* 13.45*   HEMOGLOBIN g/dL 11.3* 11.4*   HEMATOCRIT % 34.3* 35.2*   PLATELETS Thousands/uL 180 166   SEGS PCT %  --  76*   LYMPHO PCT %  --  14   MONO PCT %  --  9   EOS PCT %  --  1     Results from last 7 days   Lab Units 05/28/25 0448 05/27/25  0456   SODIUM mmol/L 135 136   POTASSIUM mmol/L 3.8 4.1   CHLORIDE mmol/L 107 108   CO2 mmol/L 22 25   BUN mg/dL 10 15   CREATININE mg/dL 0.77 0.83   ANION GAP mmol/L 6 3*   CALCIUM mg/dL 8.4 8.0*   ALBUMIN g/dL  --  3.1*   TOTAL BILIRUBIN mg/dL  --  0.52   ALK PHOS U/L  --  48   ALT U/L  --  6*   AST U/L  --  9*   GLUCOSE RANDOM mg/dL 94 92     Results from last 7 days   Lab Units 05/26/25  1654   INR  1.09             Results from last 7 days   Lab Units 05/27/25 0456 05/26/25 2004 05/26/25  1654   LACTIC ACID mmol/L  --  0.8 2.3*   PROCALCITONIN ng/ml 0.69*  --  0.66*       Recent Cultures (last 7 days):   Results from last 7 days   Lab Units 05/26/25  1654   BLOOD CULTURE  No Growth at 24 hrs.  No Growth at 24 hrs.       Imaging Results Review: I reviewed radiology reports from this admission including: chest xray.  Other Study Results Review: EKG was personally reviewed and my interpretation is: NSR. HR 99..    Last 24 Hours Medication List:     Current Facility-Administered Medications:     acetaminophen (TYLENOL) tablet 975 mg, Q6H PRN    aspirin (ECOTRIN LOW STRENGTH) EC tablet 81 mg, Daily    atorvastatin (LIPITOR) tablet 40 mg, Daily With Dinner    ceftriaxone (ROCEPHIN) 2 g/50 mL in dextrose IVPB, Q24H, Last Rate: Stopped (05/28/25 0633)    dextromethorphan-guaiFENesin (ROBITUSSIN DM) oral syrup 10 mL, Q4H PRN    DULoxetine (CYMBALTA) delayed release capsule 60 mg, Daily    enoxaparin (LOVENOX) subcutaneous injection 40 mg, Daily    gabapentin (NEURONTIN) capsule 600 mg, 4x Daily    ibuprofen (MOTRIN) tablet 600 mg, Q6H PRN    methocarbamol (ROBAXIN) tablet  500 mg, BID PRN    ondansetron (ZOFRAN) injection 4 mg, Q6H PRN    senna (SENOKOT) tablet 8.6 mg, HS PRN    traMADol (ULTRAM) tablet 50 mg, TID PRN    Administrative Statements   Today, Patient Was Seen By: DOMONIQUE Delarosa  I have spent a total time of 39 minutes in caring for this patient on the day of the visit/encounter including Patient and family education, Counseling / Coordination of care, Documenting in the medical record, Reviewing/placing orders in the medical record (including tests, medications, and/or procedures), Obtaining or reviewing history  , and Communicating with other healthcare professionals .    **Please Note: This note may have been constructed using a voice recognition system.**

## 2025-05-28 NOTE — ASSESSMENT & PLAN NOTE
Patient presented with 4 to 5 days of congestion, fever chills and cough  Met sepsis criteria on arrival with leukocytosis, fever, pneumonia on imaging  Blood cultures x 2 negative after 24 hours  COVID/flu/RSV negative  Lactic acid 2.3, with IV fluids  Procal 0.66->0.69  Strep antigen negative  Continue Rocephin day 3  Encourage mobility. OOB to chair, IS  CBC a.m.

## 2025-05-28 NOTE — PLAN OF CARE
Problem: PAIN - ADULT  Goal: Verbalizes/displays adequate comfort level or baseline comfort level  Description: Interventions:  - Encourage patient to monitor pain and request assistance  - Assess pain using appropriate pain scale  - Administer analgesics as ordered based on type and severity of pain and evaluate response  - Implement non-pharmacological measures as appropriate and evaluate response  - Consider cultural and social influences on pain and pain management  - Notify physician/advanced practitioner if interventions unsuccessful or patient reports new pain  - Educate patient/family on pain management process including their role and importance of  reporting pain   - Provide non-pharmacologic/complimentary pain relief interventions  Outcome: Progressing     Problem: INFECTION - ADULT  Goal: Absence or prevention of progression during hospitalization  Description: INTERVENTIONS:  - Assess and monitor for signs and symptoms of infection  - Monitor lab/diagnostic results  - Monitor all insertion sites, i.e. indwelling lines, tubes, and drains  - Monitor endotracheal if appropriate and nasal secretions for changes in amount and color  - [place] appropriate cooling/warming therapies per order  - Administer medications as ordered  - Instruct and encourage patient and family to use good hand hygiene technique  - Identify and instruct in appropriate isolation precautions for identified infection/condition  Outcome: Progressing

## 2025-05-28 NOTE — CASE MANAGEMENT
Case Management Assessment & Discharge Planning Note    Patient name Arslan Sanderson  Location /-01 MRN [id number]  : 1955 Date 2025       Current Admission Date: 2025  Current Admission Diagnosis:Sepsis due to pneumonia (HCC)   Patient Active Problem List    Diagnosis Date Noted    Sepsis due to pneumonia (HCC) 2025    History of lumbar surgery 2025    Tobacco use 2025    Prediabetes 2025    Elevated blood sugar 2023    Dural tear 2022    Long-term current use of opiate analgesic 2022    Uncomplicated opioid dependence (HCC) 2022    Chronic pain disorder 2022    Lumbar disc disease with radiculopathy 2022    Lumbar spondylosis 2022    Prostate cancer (HCC) 2020    Coronary artery disease involving native coronary artery of native heart without angina pectoris 2019    Dyslipidemia 2019      LOS (days): 1  Geometric Mean LOS (GMLOS) (days):   Days to GMLOS:     OBJECTIVE:    Risk of Unplanned Readmission Score: 13.51         Current admission status: Inpatient  Referral Reason: Other (d/c planning)    Preferred Pharmacy:   [facility] [address] [address]  Phone: [phone] Fax: [phone]    [id number] [place] - [address]  [address]  [place] [address]  Phone: [phone] Fax: [phone]    Primary Care Provider: Jose Ansari DO    Primary Insurance: MEDICARE  Secondary Insurance: AETNA    ASSESSMENT:  Active Health Care Proxies       Noemy Sanderson Health Care Representative - Spouse   Primary Phone: [phone] (Mobile)  Home Phone: [phone]                 Advance Directives  Does patient have a Health Care POA?: No  Was patient offered paperwork?: Yes (declined - family has paperwork art home)  Does patient have Advance Directives?: No  Was patient  offered paperwork?: Yes (declined - family has paperwork at home)         Readmission Root Cause  30 Day Readmission: No    Patient Information  Admitted from:: Home  Mental Status: Alert  During Assessment patient was accompanied by: Not accompanied during assessment  Assessment information provided by:: Patient  Primary Caregiver: Self  Support Systems: Spouse/significant other  [facility] of Residence: [facility] city do you live in?: [place]  Home entry access options. Select all that apply.: Stairs  Number of steps to enter home.: 3  Do the steps have railings?: Yes (2 rails)  Type of Current Residence: [facility]  Living Arrangements: Lives w/ Spouse/significant other  Is patient a ?: No    Activities of Daily Living Prior to Admission  Functional Status: Independent  Completes ADLs independently?: Yes  Ambulates independently?: Yes  Does patient use assisted devices?: Yes  Assisted Devices (DME) used: Straight Cane (uses on occasion)  Does patient currently own DME?: Yes  What DME does the patient currently own?: Walker, Straight Cane, Other (Comment), Shower Chair (pulse ox, bp cuff)  Does patient have a history of Outpatient Therapy (PT/OT)?: Yes  Does the patient have a history of Short-Term Rehab?: No  Does patient have a history of HHC?: Yes ([facility])  Does patient currently have HHC?: No         Patient Information Continued  Income Source: Pension/FPC  Does patient have prescription coverage?: Yes ([facility] [place])  Can the patient afford their medications and any related supplies (such as glucometers or test strips)?: Yes  Does patient receive dialysis treatments?: No  Does patient have a history of substance abuse?: No  Does patient have a history of Mental Health Diagnosis?: Yes (depression)  Is patient receiving treatment for mental health?: Yes (medication from pcp)  Has patient received inpatient treatment related to mental health in the last 2 years?: No         Means of  Transportation  Means of Transport to Appts:: Drives Self          DISCHARGE DETAILS:    Discharge planning discussed with:: carlton & wife was called at 13:15 pm  Freedom of Choice: Yes  Comments - Freedom of Choice: pr denies any d/c needs- pt is on oxygen and he does not have home oxygen  CM contacted family/caregiver?: Yes             Contacts  Patient Contacts: Noemy Sanderson  Relationship to Patient:: Family (wife)  Contact Method: Phone  Phone Number: [phone]  Reason/Outcome: Discharge Planning    Requested Home Health Care         Is the patient interested in HHC at discharge?: No    DME Referral Provided  Referral made for DME?: No    Other Referral/Resources/Interventions Provided:  Referral Comments: pt not stable for d/c -  pt admitted for  sepsis due to pneumonia-  pt on oxygen (no home oxygen)  IV nss, Iv rocephin    Would you like to participate in our [facility] service program?  : No - Declined    Treatment Team Recommendation: Home (home with  spouse & outpt follow up- family)                                         Family notified:: wife was called

## 2025-05-28 NOTE — PLAN OF CARE
Problem: PAIN - ADULT  Goal: Verbalizes/displays adequate comfort level or baseline comfort level  Description: Interventions:  - Encourage patient to monitor pain and request assistance  - Assess pain using appropriate pain scale  - Administer analgesics as ordered based on type and severity of pain and evaluate response  - Implement non-pharmacological measures as appropriate and evaluate response  - Consider cultural and social influences on pain and pain management  - Notify physician/advanced practitioner if interventions unsuccessful or patient reports new pain  - Educate patient/family on pain management process including their role and importance of  reporting pain   - Provide non-pharmacologic/complimentary pain relief interventions  Outcome: Progressing     Problem: INFECTION - ADULT  Goal: Absence or prevention of progression during hospitalization  Description: INTERVENTIONS:  - Assess and monitor for signs and symptoms of infection  - Monitor lab/diagnostic results  - Monitor all insertion sites, i.e. indwelling lines, tubes, and drains  - Monitor endotracheal if appropriate and nasal secretions for changes in amount and color  - [place] appropriate cooling/warming therapies per order  - Administer medications as ordered  - Instruct and encourage patient and family to use good hand hygiene technique  Outcome: Progressing

## 2025-05-29 ENCOUNTER — TRANSITIONAL CARE MANAGEMENT (OUTPATIENT)
Dept: FAMILY MEDICINE CLINIC | Facility: CLINIC | Age: 70
End: 2025-05-29

## 2025-05-29 ENCOUNTER — TELEPHONE (OUTPATIENT)
Dept: FAMILY MEDICINE CLINIC | Facility: CLINIC | Age: 70
End: 2025-05-29

## 2025-05-29 VITALS
RESPIRATION RATE: 16 BRPM | BODY MASS INDEX: 18.49 KG/M2 | DIASTOLIC BLOOD PRESSURE: 54 MMHG | HEART RATE: 58 BPM | OXYGEN SATURATION: 95 % | SYSTOLIC BLOOD PRESSURE: 104 MMHG | HEIGHT: 68 IN | WEIGHT: 122 LBS | TEMPERATURE: 97.8 F

## 2025-05-29 PROBLEM — R63.6 UNDERWEIGHT: Status: ACTIVE | Noted: 2025-05-29

## 2025-05-29 LAB
ANION GAP SERPL CALCULATED.3IONS-SCNC: 6 MMOL/L (ref 4–13)
BUN SERPL-MCNC: 10 MG/DL (ref 5–25)
CALCIUM SERPL-MCNC: 8.5 MG/DL (ref 8.4–10.2)
CHLORIDE SERPL-SCNC: 105 MMOL/L (ref 96–108)
CO2 SERPL-SCNC: 24 MMOL/L (ref 21–32)
CREAT SERPL-MCNC: 0.72 MG/DL (ref 0.6–1.3)
ERYTHROCYTE [DISTWIDTH] IN BLOOD BY AUTOMATED COUNT: 13.1 % (ref 11.6–15.1)
GFR SERPL CREATININE-BSD FRML MDRD: 95 ML/MIN/1.73SQ M
GLUCOSE SERPL-MCNC: 89 MG/DL (ref 65–140)
HCT VFR BLD AUTO: 35 % (ref 36.5–49.3)
HGB BLD-MCNC: 11.5 G/DL (ref 12–17)
MCH RBC QN AUTO: 30.3 PG (ref 26.8–34.3)
MCHC RBC AUTO-ENTMCNC: 32.9 G/DL (ref 31.4–37.4)
MCV RBC AUTO: 92 FL (ref 82–98)
PLATELET # BLD AUTO: 197 THOUSANDS/UL (ref 149–390)
PMV BLD AUTO: 10.6 FL (ref 8.9–12.7)
POTASSIUM SERPL-SCNC: 3.7 MMOL/L (ref 3.5–5.3)
PROCALCITONIN SERPL-MCNC: 0.2 NG/ML
RBC # BLD AUTO: 3.8 MILLION/UL (ref 3.88–5.62)
SODIUM SERPL-SCNC: 135 MMOL/L (ref 135–147)
WBC # BLD AUTO: 12.1 THOUSAND/UL (ref 4.31–10.16)

## 2025-05-29 PROCEDURE — 85027 COMPLETE CBC AUTOMATED: CPT

## 2025-05-29 PROCEDURE — 99239 HOSP IP/OBS DSCHRG MGMT >30: CPT

## 2025-05-29 PROCEDURE — 80048 BASIC METABOLIC PNL TOTAL CA: CPT

## 2025-05-29 PROCEDURE — 84145 PROCALCITONIN (PCT): CPT

## 2025-05-29 RX ADMIN — DULOXETINE HYDROCHLORIDE 60 MG: 60 CAPSULE, DELAYED RELEASE ORAL at 09:27

## 2025-05-29 RX ADMIN — GABAPENTIN 600 MG: 300 CAPSULE ORAL at 11:57

## 2025-05-29 RX ADMIN — TRAMADOL HYDROCHLORIDE 50 MG: 50 TABLET, COATED ORAL at 09:27

## 2025-05-29 RX ADMIN — ASPIRIN 81 MG: 81 TABLET, COATED ORAL at 09:27

## 2025-05-29 RX ADMIN — GABAPENTIN 600 MG: 300 CAPSULE ORAL at 09:27

## 2025-05-29 RX ADMIN — CEFTRIAXONE SODIUM 2000 MG: 10 INJECTION, POWDER, FOR SOLUTION INTRAVENOUS at 05:23

## 2025-05-29 NOTE — CASE MANAGEMENT
Case Management Discharge Planning Note    Patient name Arslan Sanderson  Location /-01 MRN [id number]  : 1955 Date 2025       Current Admission Date: 2025  Current Admission Diagnosis:Sepsis due to pneumonia (HCC)   Patient Active Problem List    Diagnosis Date Noted    Underweight 2025    Sepsis due to pneumonia (HCC) 2025    History of lumbar surgery 2025    Tobacco use 2025    Prediabetes 2025    Elevated blood sugar 2023    Dural tear 2022    Long-term current use of opiate analgesic 2022    Uncomplicated opioid dependence (HCC) 2022    Chronic pain disorder 2022    Lumbar disc disease with radiculopathy 2022    Lumbar spondylosis 2022    Prostate cancer (HCC) 2020    Coronary artery disease involving native coronary artery of native heart without angina pectoris 2019    Dyslipidemia 2019      LOS (days): 3  Geometric Mean LOS (GMLOS) (days): 4.9  Days to GMLOS:2.2     OBJECTIVE:  Risk of Unplanned Readmission Score: 11.44         Current admission status: Inpatient   Preferred Pharmacy:   [facility] - [address] [address]  Phone: [phone] Fax: [phone]    [id number] - [place] - [address]  [address]  [place] [address]  Phone: [phone] Fax: [phone]    Primary Care Provider: Jose Ansari DO    Primary Insurance: MEDICARE  Secondary Insurance: AETNA    DISCHARGE DETAILS:    Discharge planning discussed with:: patient & wife was called at 10:16 am  Freedom of Choice: Yes  Comments - Freedom of Choice: pt & wife deny any d/c needs-  pt and family are in agreement with the d/c & d/c plan  CM contacted family/caregiver?: Yes  Were Treatment Team discharge recommendations reviewed with patient/caregiver?: Yes  Did patient/caregiver verbalize  understanding of patient care needs?: Yes  Were patient/caregiver advised of the risks associated with not following Treatment Team discharge recommendations?: Yes    Contacts  Patient Contacts: Noemy Sanderson  Relationship to Patient:: Family  Contact Method: Phone  Phone Number: [phone]  Reason/Outcome: Discharge Planning    Requested Home Health Care         Is the patient interested in HHC at discharge?: No    DME Referral Provided  Referral made for DME?: No    Other Referral/Resources/Interventions Provided:  Referral Comments: pt denies any d/c needs-  pt is on room air    Would you like to participate in our [facility] service program?  : No - Declined    Treatment Team Recommendation: Home (home with spouse & outpt follow up- family)  Discharge Destination Plan:: Home (home with spouse & outpt follow up- family)  Transport at Discharge : Family         IMM reviewed with patient, patient agrees with discharge determination.                     IMM Given (Date):: 05/29/25  IMM Given to:: Patient  Family notified:: wife was called

## 2025-05-29 NOTE — DISCHARGE SUMMARY
Discharge Summary - Hospitalist   Name: Arslan Sanderson 69 y.o. male I MRN: [id number]  Unit/Bed#: -01 I Date of Admission: 5/26/2025   Date of Service: 5/29/2025 I Hospital Day: 3     Assessment & Plan  Sepsis due to pneumonia (HCC)  Patient presented with 4 to 5 days of congestion, fever chills and cough  Met sepsis criteria on arrival with leukocytosis, fever, pneumonia on imaging  Blood cultures x 2 negative after 48 hours  COVID/flu/RSV negative  Lactic acid 2.3, with IV fluids  Procal 0.66->0.69-->0.20  Urinary strep antigen negative  Patient received 3 days of Rocephin, will continue on 4-day course of Augmentin on discharge  Patient has been weaned to room air  Excluding a mild leukocytosis all sepsis parameters have resolved  Medically stable discharging home today advised follow-up PCP within a week  Coronary artery disease involving native coronary artery of native heart without angina pectoris  Continue aspirin, statin on discharge  Dyslipidemia  Continue prehospital statin on discharge  Chronic pain disorder  Continue prehospital pain regimen on discharge  Lumbar disc disease with radiculopathy  See chronic pain disorder.  He follows with our pain team and is on a regimen.  He completed radiofrequency ablations with some success.  Prostate cancer (HCC)  His PSA remains undetectable.  This is checked annually  Tobacco use  Encouraged smoking cessation  Underweight  In male smoker  Evidenced by BMI 18.55  Treated with nutritional consult  Findings pulmonary cachexia     Medical Problems       Resolved Problems  Date Reviewed: 5/29/2025   None       Discharging Physician / Practitioner: DOMONIQUE Delarosa  PCP: Jose Ansari DO  Admission Date:   Admission Orders (From admission, onward)       Ordered        05/26/25 4809  INPATIENT ADMISSION  Once                          Discharge Date: 05/29/25    Next Steps for Physician/AP Assuming Care:  Patient was treated for pneumonia while inpatient.   Discharged on a short course of Augmentin to complete a 7-day course of treatment.  Advised PCP follow-up within a week    Test Results Pending at Discharge (will require follow up):  None    Medication Changes for Discharge & Rationale:   Added Augmentin 875 milligrams/125 mg twice a day for 4 days  No other changes to prehospital medications  See after visit summary for reconciled discharge medications provided to patient and/or family.     Consultations During Hospital Stay:  None    Procedures Performed:   None    Significant Findings / Test Results:   5/26 chest x-ray: Mild bibasilar opacity suspicious for pneumonia  5/26 blood cultures negative after 48 hours    Incidental Findings:   None    Hospital Course:   For full details regarding patient's hospitalization please refer to the contents of the chart.  In brief, Arslan Sanderson is a 69 y.o. male patient who originally presented to the hospital on 5/26/2025 due to cough, shortness of breath and fever.  He has a past medical history of coronary artery disease, dyslipidemia, chronic pain disorder, lumbar disc disease with radiculopathy, prostate cancer, and tobacco use.  He denied any sick contacts prehospital.  He reported that his symptoms started a few days prior to arrival.  He tried to use Tylenol for fever over-the-counter Robitussin and nasal spray to manage his symptoms but due to no relief and shortness of breath came to the ED for evaluation.  He met sepsis criteria on arrival with fever, tachycardia, tachypnea, and leukocytosis with pneumonia on chest imaging.  He received 2 L IV fluid in the ED, as well as a dose of cefepime and vancomycin.  He was admitted to [facility] service for further management.  [facility]  He has been weaned to room air and denies shortness of breath.  Was ambulating in the halls without shortness of breath.  Other than a minimal white count all parameters for sepsis have resolved.  He is  "medically stable will be transitioned on a 4-day course of Augmentin on discharge to complete a 7-day course of antibiotic treatment for pneumonia.  Advised to follow-up with PCP within a week.  Patient declined call to family states he will call for a ride home.          Please see above list of diagnoses and related plan for additional information.     Discharge Day Visit / Exam:   Subjective: Denies any dizziness, lightheadedness, chest pain or palpitations.  Denies shortness of breath.  Denies pain or discomfort.  Anxious to go home.  Vitals: Blood Pressure: 104/54 (05/29/25 1005)  Pulse: 58 (05/29/25 1005)  Temperature: 97.8 °F (36.6 °C) (05/29/25 1005)  Temp Source: Oral (05/28/25 1423)  Respirations: 16 (05/29/25 1005)  Height: 5' 8\" (172.7 cm) (05/26/25 1847)  Weight - Scale: 55.3 kg (122 lb) (05/26/25 1847)  SpO2: 95 % (05/29/25 1005)  Physical Exam  Vitals reviewed.   Constitutional:       General: He is not in acute distress.     Appearance: He is well-developed.   HENT:      Head: Normocephalic and atraumatic.     Cardiovascular:      Rate and Rhythm: Normal rate and regular rhythm.      Heart sounds: No murmur heard.  Pulmonary:      Effort: Pulmonary effort is normal. No respiratory distress.      Breath sounds: Normal breath sounds. No wheezing or rales.   Abdominal:      General: There is no distension.      Palpations: Abdomen is soft.      Tenderness: There is no abdominal tenderness. There is no guarding.     Musculoskeletal:         General: No swelling.     Skin:     General: Skin is warm and dry.      Capillary Refill: Capillary refill takes less than 2 seconds.     Neurological:      General: No focal deficit present.      Mental Status: He is alert and oriented to person, place, and time. Mental status is at baseline.     Psychiatric:         Mood and Affect: Mood normal.         Behavior: Behavior normal.         Thought Content: Thought content normal.         Judgment: Judgment normal. "          Discussion with Family: Patient declined call to .     Discharge instructions/Information to patient and family:   See after visit summary for information provided to patient and family.      Provisions for Follow-Up Care:  See after visit summary for information related to follow-up care and any pertinent home health orders.      Mobility at time of Discharge:   Basic Mobility Inpatient Raw Score: 23  JH-HLM Goal: 7: Walk 25 feet or more  JH-HLM Achieved: 8: Walk 250 feet ot more  HLM Goal achieved. Continue to encourage appropriate mobility.     Disposition:   Home    Planned Readmission: No    Administrative Statements   Discharge Statement:  I have spent a total time of 38 minutes in caring for this patient on the day of the visit/encounter. >30 minutes of time was spent on: Patient and family education, Counseling / Coordination of care, Documenting in the medical record, Reviewing / ordering tests, medicine, procedures  , and Communicating with other healthcare professionals .    **Please Note: This note may have been constructed using a voice recognition system**

## 2025-05-29 NOTE — DISCHARGE INSTR - AVS FIRST PAGE
Please follow-up with your primary care physician within a week of discharge  You received Rocephin antibiotics for pneumonia while inpatient.  You will continue on Augmentin on discharge for an additional 4 days to complete 1 week treatment with antibiotics for pneumonia.  I have sent a prescription electronically to your pharmacy and will be available for pickup for the Augmentin.  I am making no changes to your prehospital medications              It has been a pleasure taking care of you. I wish you all the best on discharge!  Jolene YOUSSEF

## 2025-05-29 NOTE — PLAN OF CARE
Problem: PAIN - ADULT  Goal: Verbalizes/displays adequate comfort level or baseline comfort level  Description: Interventions:  - Encourage patient to monitor pain and request assistance  - Assess pain using appropriate pain scale  - Administer analgesics as ordered based on type and severity of pain and evaluate response  - Implement non-pharmacological measures as appropriate and evaluate response  - Consider cultural and social influences on pain and pain management  - Notify physician/advanced practitioner if interventions unsuccessful or patient reports new pain  - Educate patient/family on pain management process including their role and importance of  reporting pain   - Provide non-pharmacologic/complimentary pain relief interventions  Outcome: Progressing     Problem: INFECTION - ADULT  Goal: Absence or prevention of progression during hospitalization  Description: INTERVENTIONS:  - Assess and monitor for signs and symptoms of infection  - Monitor lab/diagnostic results  - Monitor all insertion sites, i.e. indwelling lines, tubes, and drains  - Monitor endotracheal if appropriate and nasal secretions for changes in amount and color  - [place] appropriate cooling/warming therapies per order  - Administer medications as ordered  - Instruct and encourage patient and family to use good hand hygiene technique  Outcome: Progressing     Problem: SAFETY ADULT  Goal: Maintains/Returns to pre admission functional level  Description: INTERVENTIONS:  - Perform AM-PAC 6 Click Basic Mobility/ Daily Activity assessment daily.  - Set and communicate daily mobility goal to care team and patient/family/caregiver.   - Collaborate with rehabilitation services on mobility goals if consulted  - Out of bed to chair   - Out of bed for meals  - Out of bed for toileting  - Record patient progress and toleration of activity level   Outcome: Progressing     Problem: Knowledge Deficit  Goal: Patient/family/caregiver demonstrates  understanding of disease process, treatment plan, medications, and discharge instructions  Description: Complete learning assessment and assess knowledge base.  Interventions:  - Provide teaching at level of understanding  - Provide teaching via preferred learning methods  Outcome: Progressing

## 2025-05-29 NOTE — ASSESSMENT & PLAN NOTE
In male smoker  Evidenced by BMI 18.55  Treated with nutritional consult  Findings pulmonary cachexia

## 2025-05-29 NOTE — NURSING NOTE
Pt discharged to home in stable condition via spouse. IV removed and catheter intact. Belongings packed and sent along with AVS. AVS reviewed and all questions answered.

## 2025-05-29 NOTE — ASSESSMENT & PLAN NOTE
Patient presented with 4 to 5 days of congestion, fever chills and cough  Met sepsis criteria on arrival with leukocytosis, fever, pneumonia on imaging  Blood cultures x 2 negative after 48 hours  COVID/flu/RSV negative  Lactic acid 2.3, with IV fluids  Procal 0.66->0.69-->0.20  Urinary strep antigen negative  Patient received 3 days of Rocephin, will continue on 4-day course of Augmentin on discharge  Patient has been weaned to room air  Excluding a mild leukocytosis all sepsis parameters have resolved  Medically stable discharging home today advised follow-up PCP within a week

## 2025-06-01 LAB
BACTERIA BLD CULT: NORMAL
BACTERIA BLD CULT: NORMAL

## 2025-06-05 ENCOUNTER — OFFICE VISIT (OUTPATIENT)
Dept: FAMILY MEDICINE CLINIC | Facility: CLINIC | Age: 70
End: 2025-06-05
Payer: MEDICARE

## 2025-06-05 VITALS
HEART RATE: 73 BPM | WEIGHT: 114.4 LBS | SYSTOLIC BLOOD PRESSURE: 120 MMHG | HEIGHT: 68 IN | OXYGEN SATURATION: 99 % | BODY MASS INDEX: 17.34 KG/M2 | DIASTOLIC BLOOD PRESSURE: 85 MMHG | TEMPERATURE: 96.9 F

## 2025-06-05 DIAGNOSIS — J44.1 COPD WITH EXACERBATION (HCC): ICD-10-CM

## 2025-06-05 DIAGNOSIS — R63.6 UNDERWEIGHT: ICD-10-CM

## 2025-06-05 DIAGNOSIS — A41.9 SEPSIS DUE TO PNEUMONIA (HCC): Primary | ICD-10-CM

## 2025-06-05 DIAGNOSIS — J18.9 SEPSIS DUE TO PNEUMONIA (HCC): Primary | ICD-10-CM

## 2025-06-05 DIAGNOSIS — I25.10 CORONARY ARTERY DISEASE INVOLVING NATIVE CORONARY ARTERY OF NATIVE HEART WITHOUT ANGINA PECTORIS: ICD-10-CM

## 2025-06-05 DIAGNOSIS — C61 PROSTATE CANCER (HCC): ICD-10-CM

## 2025-06-05 PROCEDURE — 99495 TRANSJ CARE MGMT MOD F2F 14D: CPT | Performed by: INTERNAL MEDICINE

## 2025-06-05 RX ORDER — PREDNISONE 10 MG/1
TABLET ORAL
Qty: 20 TABLET | Refills: 0 | Status: SHIPPED | OUTPATIENT
Start: 2025-06-05

## 2025-06-05 NOTE — ASSESSMENT & PLAN NOTE
Examination today revealing an expiratory wheezing, some rhonchi still on the left.  Recommend prednisone taper.  No further antibiotics at this time.  Please this will help with the postnasal drip as well.  Orders:  •  predniSONE 10 mg tablet; 4 pills for 2 days, then 3 pills for 2 days, then 2 pills for 2 days then 1 pill for 2 days.

## 2025-06-05 NOTE — PROGRESS NOTES
Transition of Care Visit:  Name: Arslan Sanderson      : 1955      MRN: [id number]  Encounter Provider: Jose Ansari DO  Encounter Date: 2025   Encounter department: [facility] PRIMARY CARE    Assessment & Plan  Sepsis due to pneumonia (HCC)  Patient is here for transition of care appointment.  Reviewed all relevant data from the hospitalization including consultations, lab tests and x-rays.  He reviewed all his current medications and reconcile them.    Continues with upper respiratory symptomatology.       COPD with exacerbation (HCC)  Examination today revealing an expiratory wheezing, some rhonchi still on the left.  Recommend prednisone taper.  No further antibiotics at this time.  Please this will help with the postnasal drip as well.  Orders:  •  predniSONE 10 mg tablet; 4 pills for 2 days, then 3 pills for 2 days, then 2 pills for 2 days then 1 pill for 2 days.    Coronary artery disease involving native coronary artery of native heart without angina pectoris  Continue current medical regimen which includes moderate intensity statin and aspirin.       Prostate cancer (HCC)  Remains in remission       Underweight  Is down to 114 pounds, with most recent weights being consistent at 118.  Will continue to monitor this, recommend supplementation.  Recent illness did not help.            History of Present Illness     Transitional Care Management Review:   Arslan Sanderson is a 69 y.o. male here for TCM follow up.     During the TCM phone call patient stated:  TCM Call (since 2025)     Date and time call was made  2025  1:37 PM    Hospital care reviewed  Records reviewed    Patient was hospitialized at  [facility]    Date of Admission  25    Date of discharge  25    Diagnosis  Sepsis due to pneumonia    Disposition  Home    Were the patients medications reviewed and updated  Yes      TCM Call (since 2025)     Did you obtain your prescribed medications  Yes     "Do you need help managing your prescriptions or medications  No    Is transportation to your appointment needed  No    Living Arrangements  Spouse or Significiant other    Are you recieving home care services  No        Hospitalization.  Recent pneumonia with underlying sepsis symptomatology.  Runs a chronically low blood pressures unsure this influenced decision for admission.  Treated with Rocephin and transition to Augmentin.  Continues to smoke, has underlying emphysema.  He does continue to have postnasal drip and sinus congestion.  Continues to cough and bring up clear to green mucus.  No chest pain associated with this.  In the initial phases he did have rigors.  Blood cultures were all negative.      Review of Systems   Constitutional:  Negative for chills and fever.   HENT:  Positive for congestion, postnasal drip and rhinorrhea. Negative for ear pain and sore throat.    Eyes:  Negative for visual disturbance.   Respiratory:  Negative for cough and shortness of breath.    Cardiovascular:  Negative for chest pain and leg swelling.   Gastrointestinal:  Negative for abdominal pain, diarrhea, nausea and vomiting.   Genitourinary:  Negative for dysuria, flank pain, frequency and urgency.   Musculoskeletal:  Positive for arthralgias and back pain. Negative for myalgias and neck pain.   Skin:  Negative for rash.   Neurological:  Negative for dizziness, weakness, light-headedness and headaches.   Hematological: Negative.    Psychiatric/Behavioral:  Negative for agitation, confusion and suicidal ideas. The patient is not nervous/anxious.    All other systems reviewed and are negative.    Objective   /85 (BP Location: Left arm, Patient Position: Sitting, Cuff Size: Adult)   Pulse 73   Temp (!) 96.9 °F (36.1 °C) (Tympanic)   Ht 5' 8\" (1.727 m)   Wt 51.9 kg (114 lb 6.4 oz)   SpO2 99%   BMI 17.39 kg/m²     Physical Exam  Vitals and nursing note reviewed.   Constitutional:       General: He is not in acute " distress.     Appearance: Normal appearance. He is well-developed.   HENT:      Head: Normocephalic and atraumatic.      Nose: Rhinorrhea present.     Eyes:      Conjunctiva/sclera: Conjunctivae normal.       Cardiovascular:      Rate and Rhythm: Normal rate and regular rhythm.      Pulses: Normal pulses.      Heart sounds: Normal heart sounds. No murmur heard.  Pulmonary:      Effort: Pulmonary effort is normal. No respiratory distress.      Breath sounds: Wheezing and rhonchi present.   Abdominal:      Palpations: Abdomen is soft.      Tenderness: There is no abdominal tenderness.     Musculoskeletal:         General: No swelling.      Cervical back: Neck supple.     Skin:     General: Skin is warm and dry.      Capillary Refill: Capillary refill takes less than 2 seconds.     Neurological:      General: No focal deficit present.      Mental Status: He is alert and oriented to person, place, and time.     Psychiatric:         Mood and Affect: Mood normal.       Medications have been reviewed by provider in current encounter

## 2025-06-05 NOTE — ASSESSMENT & PLAN NOTE
Patient is here for transition of care appointment.  Reviewed all relevant data from the hospitalization including consultations, lab tests and x-rays.  He reviewed all his current medications and reconcile them.    Continues with upper respiratory symptomatology.

## 2025-06-05 NOTE — ASSESSMENT & PLAN NOTE
Is down to 114 pounds, with most recent weights being consistent at 118.  Will continue to monitor this, recommend supplementation.  Recent illness did not help.

## 2025-06-12 ENCOUNTER — OFFICE VISIT (OUTPATIENT)
Age: 70
End: 2025-06-12
Payer: MEDICARE

## 2025-06-12 VITALS — BODY MASS INDEX: 17.28 KG/M2 | HEIGHT: 68 IN | WEIGHT: 114 LBS

## 2025-06-12 DIAGNOSIS — M47.816 LUMBAR SPONDYLOSIS: ICD-10-CM

## 2025-06-12 DIAGNOSIS — M51.16 LUMBAR DISC DISEASE WITH RADICULOPATHY: ICD-10-CM

## 2025-06-12 DIAGNOSIS — G89.4 CHRONIC PAIN DISORDER: Primary | ICD-10-CM

## 2025-06-12 PROCEDURE — 99214 OFFICE O/P EST MOD 30 MIN: CPT | Performed by: NURSE PRACTITIONER

## 2025-06-12 PROCEDURE — G2211 COMPLEX E/M VISIT ADD ON: HCPCS | Performed by: NURSE PRACTITIONER

## 2025-06-12 RX ORDER — TRAMADOL HYDROCHLORIDE 50 MG/1
50 TABLET ORAL 3 TIMES DAILY PRN
Qty: 90 TABLET | Refills: 2 | Status: SHIPPED | OUTPATIENT
Start: 2025-06-19 | End: 2025-09-17

## 2025-06-12 RX ORDER — GABAPENTIN 600 MG/1
600 TABLET ORAL 4 TIMES DAILY
Qty: 360 TABLET | Refills: 1 | Status: SHIPPED | OUTPATIENT
Start: 2025-06-12 | End: 2025-09-10

## 2025-06-12 NOTE — PROGRESS NOTES
Assessment:  1. Chronic pain disorder    2. Lumbar spondylosis    3. Lumbar disc disease with radiculopathy        Plan:  While the patient was in the office today, I did have a thorough conversation regarding their chronic pain syndrome, medication management, and treatment plan options.  Patient is being seen for a follow-up visit.  He is reporting about 70% improvement in his back pain following bilateral L4-5 and L5-S1 radiofrequency ablations.  The right side was performed on 1/3/2025.  The left side was performed on 2/12/2025.    MRI of his lumbar spine was recently updated and reveals multilevel degenerative changes with varying degrees of canal stenosis(mild L4-5) and foraminal narrowing(mild bilateral L4-5 and left L5-S1).  MRI results were reviewed with patient during today's visit.    Will continue with current medication regimen.    Renewed tramadol 50 mg 3 times daily if needed for pain.  A prescription with 2 refills was sent to his pharmacy with a do not fill date of 6/19/2025.    Continue gabapentin 600/600/1200.  A 90-day supply of this medication with 1 refill was sent to his mail order pharmacy.    Continue Robaxin 500 mg twice daily if needed for spasms.  He did not require a refill of this medication during today's visit.    Continue Cymbalta 60 mg daily as prescribed by his PCP.    There are risks associated with opioid medications, including dependence, addiction and tolerance. The patient understands and agrees to use these medications only as prescribed. Potential side effects of the medications include, but are not limited to, constipation, drowsiness, addiction, impaired judgment and risk of fatal overdose if not taken as prescribed. The patient was warned against driving while taking sedation medications.  Sharing medications is a felony. At this point in time, the patient is showing no signs of addiction, abuse, diversion or suicidal ideation.    While the patient was in the office today  an opioid contract was thoroughly reviewed and signed by the patient. The patient was given adequate time to ask questions in regards to the contract and a signed copy was sent home for his/her records.    [place] Prescription Drug Monitoring Program report was reviewed and was appropriate     The patient will follow-up in 3 months for medication prescription refill and reevaluation. The patient was advised to contact the office should their symptoms worsen in the interim. The patient was agreeable and verbalized an understanding.    My impressions and treatment recommendations were discussed in detail with the patient who verbalized understanding and had no further questions.  Discharge instructions were provided. I personally saw and examined the patient and I agree with the above discussed plan of care.    No orders of the defined types were placed in this encounter.    New Medications Ordered This Visit   Medications    gabapentin (NEURONTIN) 600 MG tablet     Sig: Take 1 tablet (600 mg total) by mouth 4 (four) times a day     Dispense:  360 tablet     Refill:  1    traMADol (ULTRAM) 50 mg tablet     Sig: Take 1 tablet (50 mg total) by mouth 3 (three) times a day as needed for moderate pain or severe pain Do not start before June 19, 2025.     Dispense:  90 tablet     Refill:  2       History of Present Illness:  Arslan Sanderson is a 69 y.o. male who presents for a follow up office visit in regards to No chief complaint on file..   The patient’s current symptoms include complaints of low back pain that can radiate down the posterior right thigh and anterior right shin.  Current pain level is a 2/10.  Quality of pain is described as constant, burning, aching, numb.    Current pain medications includes:  Tramadol 50 mg 3 times daily if needed for pain, gabapentin 600/600/1200, Robaxin 500 mg twice daily if needed for spasms.  PCP prescribes Cymbalta 60 mg daily.  The patient reports that this regimen is providing  up to 70% pain relief.  The patient is reporting no side effects from this pain medication regimen.    Opioid contract date 6/12/2025    Last UDS Date 3/13/2025                    I have personally reviewed and/or updated the patient's past medical history, past surgical history, family history, social history, current medications, allergies, and vital signs today.     Review of Systems   Musculoskeletal:  Positive for gait problem.   All other systems reviewed and are negative.      Problem List[1]    Past Medical History[2]    Past Surgical History[3]    Family History[4]    Social History     Occupational History    Not on file   Tobacco Use    Smoking status: Every Day     Current packs/day: 0.50     Average packs/day: 0.8 packs/day for 31.4 years (26.2 ttl pk-yrs)     Types: Cigarettes     Start date: 1994     Passive exposure: Current    Smokeless tobacco: Never   Vaping Use    Vaping status: Never Used   Substance and Sexual Activity    Alcohol use: Not Currently    Drug use: Never    Sexual activity: Not Currently     Partners: Female     Birth control/protection: None       Current Outpatient Medications on File Prior to Visit   Medication Sig    acetaminophen (TYLENOL) 500 mg tablet Take 500 mg by mouth in the morning and 500 mg at noon and 500 mg in the evening.    aspirin (ECOTRIN LOW STRENGTH) 81 mg EC tablet Take 81 mg by mouth in the morning.    DULoxetine (CYMBALTA) 60 mg delayed release capsule Take 1 capsule (60 mg total) by mouth daily    ibuprofen (MOTRIN) 200 mg tablet Take by mouth every 6 (six) hours as needed for mild pain    Melatonin 10 MG TABS Take 10 mg by mouth daily at bedtime    methocarbamol (ROBAXIN) 500 mg tablet Take 1 tablet (500 mg total) by mouth 2 (two) times a day    predniSONE 10 mg tablet 4 pills for 2 days, then 3 pills for 2 days, then 2 pills for 2 days then 1 pill for 2 days.    rosuvastatin (CRESTOR) 20 MG tablet Take 1 tablet (20 mg total) by mouth daily     "[DISCONTINUED] gabapentin (NEURONTIN) 600 MG tablet Take 1 tablet (600 mg total) by mouth 4 (four) times a day    [DISCONTINUED] traMADol (ULTRAM) 50 mg tablet Take 1 tablet (50 mg total) by mouth 3 (three) times a day as needed for moderate pain or severe pain     No current facility-administered medications on file prior to visit.       Allergies[5]    Physical Exam:    Ht 5' 8\" (1.727 m)   Wt 51.7 kg (114 lb)   BMI 17.33 kg/m²     Constitutional:normal, well developed, well nourished, alert, in no distress and non-toxic and no overt pain behavior.  Eyes:anicteric  HEENT:grossly intact  Neck:supple, symmetric, trachea midline and no masses   Pulmonary:even and unlabored  Cardiovascular:No edema or pitting edema present  Skin:Normal without rashes or lesions and well hydrated  Psychiatric:Mood and affect appropriate  Neurologic:Cranial Nerves II-XII grossly intact  Musculoskeletal:normal    Imaging         [1]   Patient Active Problem List  Diagnosis    Coronary artery disease involving native coronary artery of native heart without angina pectoris    Dyslipidemia    Dural tear    Long-term current use of opiate analgesic    Uncomplicated opioid dependence (HCC)    Chronic pain disorder    Lumbar disc disease with radiculopathy    Lumbar spondylosis    Elevated blood sugar    Prostate cancer (HCC)    Prediabetes    Tobacco use    History of lumbar surgery    Sepsis due to pneumonia (HCC)    Underweight    COPD with exacerbation (HCC)   [2]   Past Medical History:  Diagnosis Date    BCC (basal cell carcinoma)     in past left side of nose    Cancer (HCC) 1/2019    Chronic pain disorder     low back and down rle    Coronary artery disease     cabg x 1 2006    Dyslipidemia     Hyperlipidemia     Idiopathic hypotension     Malignant neoplasm prostate (HCC)    [3]   Past Surgical History:  Procedure Laterality Date    COLONOSCOPY      CORONARY ARTERY BYPASS GRAFT  07/10/2006    LIMA to the diagonal artery    INGUINAL " HERNIA REPAIR Right 1996    LAMINECTOMY  1/2020    OK LAPS SURG [id number] RPBIC RAD W/NRV SPARING ROBOT N/A 04/22/2019    Procedure: PROSTATECTOMY RADICAL W/ROBOTICS, B/L PELVIC NODE DISSECT;  Surgeon: Taran Adam MD;  Location: [facility];  Service: Urology    PROSTATE SURGERY  04/22/2019    SKIN CANCER EXCISION  2016    left side nose    SPINE SURGERY  1/2020    THORACIC SPINE SURGERY      T3-T4, removal of an arachnoid cyst.    US GUIDED PROSTATE BIOPSY  02/28/2019   [4]   Family History  Problem Relation Name Age of Onset    Cancer Father Arslan         bladder    Lung cancer Father Arslan         metastatic    Dementia Mother Elysia     Anxiety disorder Mother Elysia    [5]   Allergies  Allergen Reactions    Atorvastatin

## 2025-06-26 PROBLEM — J18.9 SEPSIS DUE TO PNEUMONIA (HCC): Status: RESOLVED | Noted: 2025-05-26 | Resolved: 2025-06-26

## 2025-06-26 PROBLEM — A41.9 SEPSIS DUE TO PNEUMONIA (HCC): Status: RESOLVED | Noted: 2025-05-26 | Resolved: 2025-06-26

## 2025-08-15 ENCOUNTER — APPOINTMENT (OUTPATIENT)
Dept: LAB | Facility: CLINIC | Age: 70
End: 2025-08-15
Payer: MEDICARE

## 2025-08-15 ENCOUNTER — APPOINTMENT (OUTPATIENT)
Dept: LAB | Facility: CLINIC | Age: 70
End: 2025-08-15
Attending: INTERNAL MEDICINE
Payer: MEDICARE

## (undated) DEVICE — ENDOPOUCH RETRIEVER SPECIMEN RETRIEVAL BAGS: Brand: ENDOPOUCH RETRIEVER

## (undated) DEVICE — SUT ETHILON 2-0 FS 18 IN 664H

## (undated) DEVICE — ENSEAL LAPAROSCOPIC TISSUE SEALER G2 CURVED JAW FOR USE WITH G2 GENERATOR 5MM DIAMETER 35CM SHAFT LENGTH: Brand: ENSEAL

## (undated) DEVICE — SURGICEL 4 X 8

## (undated) DEVICE — NEEDLE 25G X 1 1/2

## (undated) DEVICE — CATH FOLEY 20FR 30ML 2 WAY SILICONE-ELASTIMER

## (undated) DEVICE — SUT MONOCRYL 3-0 RB-1 27 IN Y215H

## (undated) DEVICE — ARM DRAPE

## (undated) DEVICE — ASTOUND STANDARD SURGICAL GOWN, XL: Brand: CONVERTORS

## (undated) DEVICE — SUT VICRYL 2-0 SH 27 IN UNDYED J417H

## (undated) DEVICE — ADHESIVE SKN CLSR HISTOACRYL FLEX 0.5ML LF

## (undated) DEVICE — SUT VICRYL 0 CT-1 27 IN J260H

## (undated) DEVICE — CANNULA SEAL

## (undated) DEVICE — 3M™ STERI-STRIP™ COMPOUND BENZOIN TINCTURE 40 BAGS/CARTON 4 CARTONS/CASE C1544: Brand: 3M™ STERI-STRIP™

## (undated) DEVICE — COBRA GRASPER: Brand: ENDOWRIST

## (undated) DEVICE — TROCAR PORT ACCESS 12 X120MM W/BLDLS OPTICAL TIP AIRSEAL

## (undated) DEVICE — GLOVE SRG BIOGEL ECLIPSE 6

## (undated) DEVICE — MONOPOLAR CURVED SCISSORS: Brand: ENDOWRIST

## (undated) DEVICE — URIMETER 2500ML

## (undated) DEVICE — TIP COVER ACCESSORY

## (undated) DEVICE — TOWEL SURG XR DETECT GREEN STRL RFD

## (undated) DEVICE — BIPOLAR GRASPER, LONG: Brand: ENDOWRIST

## (undated) DEVICE — BLADELESS OBTURATOR: Brand: WECK VISTA

## (undated) DEVICE — GLOVE INDICATOR PI UNDERGLOVE SZ 6.5 BLUE

## (undated) DEVICE — CHLORAPREP HI-LITE 26ML ORANGE

## (undated) DEVICE — PERMANENT CAUTERY HOOK: Brand: ENDOWRIST

## (undated) DEVICE — SUT MONOCRYL 4-0 PS-2 27 IN Y426H

## (undated) DEVICE — LARGE NEEDLE DRIVER: Brand: ENDOWRIST

## (undated) DEVICE — GLOVE SRG BIOGEL 6

## (undated) DEVICE — SPONGE DRAIN 4 X 4

## (undated) DEVICE — AIRSEAL TUBE SMOKE EVAC LUMENX3 FILTERED

## (undated) DEVICE — JACKSON-PRATT 100CC BULB RESERVOIR: Brand: CARDINAL HEALTH

## (undated) DEVICE — DRAPE SHEET X-LG

## (undated) DEVICE — 40583 XL ADVANCED TRENDELENBURG POSITIONING KIT: Brand: 40583 XL ADVANCED TRENDELENBURG POSITIONING KIT

## (undated) DEVICE — SCD SEQUENTIAL COMPRESSION COMFORT SLEEVE MEDIUM KNEE LENGTH: Brand: KENDALL SCD

## (undated) DEVICE — SUT MONOCRYL 3-0 RB-1 27 IN Y305H

## (undated) DEVICE — 40529 DERMAPROX PAD 11'' X 15'' X 1'': Brand: 40529 DERMAPROX PAD 11'' X 15'' X 1''

## (undated) DEVICE — GLOVE SRG BIOGEL 7

## (undated) DEVICE — 3M™ DURAPORE™ SURGICAL TAPE 1538-3, 3 INCH X 10 YARD (7,5CM X 9,1M), 4 ROLLS/BOX: Brand: 3M™ DURAPORE™

## (undated) DEVICE — BASIC SINGLE BASIN-LF: Brand: MEDLINE INDUSTRIES, INC.

## (undated) DEVICE — INSUFFLATION NEEDLE TO ESTABLISH PNEUMOPERITONEUM.: Brand: INSUFFLATION NEEDLE

## (undated) DEVICE — KIT, BETHLEHEM ROBOTIC PROST: Brand: CARDINAL HEALTH

## (undated) DEVICE — DRAIN SPONGES,6 PLY: Brand: EXCILON

## (undated) DEVICE — GLOVE SRG BIOGEL 7.5

## (undated) DEVICE — JP PERF DRN SIL FLT 10MM FULL: Brand: CARDINAL HEALTH

## (undated) DEVICE — SUT VICRYL 0 UR-6 27 IN J603H

## (undated) DEVICE — COLUMN DRAPE

## (undated) DEVICE — LUBRICANT INST ELECTROLUBE ANTISTK WO PAD

## (undated) DEVICE — GLOVE INDICATOR PI UNDERGLOVE SZ 8 BLUE

## (undated) DEVICE — LUBRICANT SURGILUBE TUBE 4 OZ  FLIP TOP

## (undated) DEVICE — TROCAR: Brand: KII FIOS FIRST ENTRY